# Patient Record
Sex: FEMALE | Race: WHITE | Employment: OTHER | URBAN - METROPOLITAN AREA
[De-identification: names, ages, dates, MRNs, and addresses within clinical notes are randomized per-mention and may not be internally consistent; named-entity substitution may affect disease eponyms.]

---

## 2017-07-07 ENCOUNTER — HOSPITAL ENCOUNTER (INPATIENT)
Age: 82
LOS: 1 days | Discharge: HOME | DRG: 287 | End: 2017-07-08
Attending: INTERNAL MEDICINE | Admitting: INTERNAL MEDICINE
Payer: MEDICARE

## 2017-07-07 PROBLEM — I20.0 UNSTABLE ANGINA (HCC): Status: ACTIVE | Noted: 2017-07-07

## 2017-07-07 LAB — TROPONIN I SERPL-MCNC: <0.02 NG/ML (ref 0.02–0.05)

## 2017-07-07 PROCEDURE — 84484 ASSAY OF TROPONIN QUANT: CPT | Performed by: INTERNAL MEDICINE

## 2017-07-07 PROCEDURE — 74011250636 HC RX REV CODE- 250/636: Performed by: INTERNAL MEDICINE

## 2017-07-07 PROCEDURE — 36415 COLL VENOUS BLD VENIPUNCTURE: CPT | Performed by: INTERNAL MEDICINE

## 2017-07-07 PROCEDURE — 65660000000 HC RM CCU STEPDOWN

## 2017-07-07 PROCEDURE — 74011000250 HC RX REV CODE- 250: Performed by: INTERNAL MEDICINE

## 2017-07-07 PROCEDURE — 93005 ELECTROCARDIOGRAM TRACING: CPT | Performed by: INTERNAL MEDICINE

## 2017-07-07 PROCEDURE — 74011250637 HC RX REV CODE- 250/637

## 2017-07-07 PROCEDURE — 74011250637 HC RX REV CODE- 250/637: Performed by: INTERNAL MEDICINE

## 2017-07-07 RX ORDER — CLOPIDOGREL BISULFATE 75 MG/1
75 TABLET ORAL DAILY
Status: DISCONTINUED | OUTPATIENT
Start: 2017-07-08 | End: 2017-07-08 | Stop reason: HOSPADM

## 2017-07-07 RX ORDER — GUAIFENESIN 100 MG/5ML
81 LIQUID (ML) ORAL DAILY
Status: DISCONTINUED | OUTPATIENT
Start: 2017-07-08 | End: 2017-07-08 | Stop reason: HOSPADM

## 2017-07-07 RX ORDER — AMIODARONE HYDROCHLORIDE 200 MG/1
200 TABLET ORAL 2 TIMES DAILY
Status: DISCONTINUED | OUTPATIENT
Start: 2017-07-08 | End: 2017-07-08 | Stop reason: HOSPADM

## 2017-07-07 RX ORDER — SODIUM CHLORIDE 0.9 % (FLUSH) 0.9 %
5-10 SYRINGE (ML) INJECTION AS NEEDED
Status: DISCONTINUED | OUTPATIENT
Start: 2017-07-07 | End: 2017-07-08 | Stop reason: HOSPADM

## 2017-07-07 RX ORDER — SODIUM CHLORIDE 9 MG/ML
75 INJECTION, SOLUTION INTRAVENOUS CONTINUOUS
Status: DISCONTINUED | OUTPATIENT
Start: 2017-07-07 | End: 2017-07-08 | Stop reason: HOSPADM

## 2017-07-07 RX ORDER — TIMOLOL MALEATE 5 MG/ML
1 SOLUTION/ DROPS OPHTHALMIC DAILY
Status: DISCONTINUED | OUTPATIENT
Start: 2017-07-08 | End: 2017-07-08 | Stop reason: HOSPADM

## 2017-07-07 RX ORDER — LATANOPROST 50 UG/ML
1 SOLUTION/ DROPS OPHTHALMIC
Status: DISCONTINUED | OUTPATIENT
Start: 2017-07-07 | End: 2017-07-08 | Stop reason: HOSPADM

## 2017-07-07 RX ORDER — NITROGLYCERIN 0.4 MG/1
0.4 TABLET SUBLINGUAL
Status: DISCONTINUED | OUTPATIENT
Start: 2017-07-07 | End: 2017-07-08 | Stop reason: HOSPADM

## 2017-07-07 RX ORDER — ROPINIROLE 1 MG/1
1 TABLET, FILM COATED ORAL 3 TIMES DAILY
Status: DISCONTINUED | OUTPATIENT
Start: 2017-07-07 | End: 2017-07-08 | Stop reason: HOSPADM

## 2017-07-07 RX ORDER — ONDANSETRON 2 MG/ML
4 INJECTION INTRAMUSCULAR; INTRAVENOUS
Status: DISCONTINUED | OUTPATIENT
Start: 2017-07-07 | End: 2017-07-08 | Stop reason: HOSPADM

## 2017-07-07 RX ORDER — NITROGLYCERIN 0.4 MG/1
0.4 TABLET SUBLINGUAL AS NEEDED
Status: DISCONTINUED | OUTPATIENT
Start: 2017-07-07 | End: 2017-07-07 | Stop reason: SDUPTHER

## 2017-07-07 RX ORDER — ADHESIVE BANDAGE
30 BANDAGE TOPICAL DAILY PRN
Status: DISCONTINUED | OUTPATIENT
Start: 2017-07-07 | End: 2017-07-08 | Stop reason: HOSPADM

## 2017-07-07 RX ORDER — NITROGLYCERIN 0.4 MG/1
TABLET SUBLINGUAL
Status: COMPLETED
Start: 2017-07-07 | End: 2017-07-07

## 2017-07-07 RX ORDER — MAG HYDROX/ALUMINUM HYD/SIMETH 200-200-20
30 SUSPENSION, ORAL (FINAL DOSE FORM) ORAL
Status: DISCONTINUED | OUTPATIENT
Start: 2017-07-07 | End: 2017-07-08 | Stop reason: HOSPADM

## 2017-07-07 RX ORDER — PANTOPRAZOLE SODIUM 40 MG/1
40 TABLET, DELAYED RELEASE ORAL
Status: DISCONTINUED | OUTPATIENT
Start: 2017-07-07 | End: 2017-07-08 | Stop reason: HOSPADM

## 2017-07-07 RX ORDER — ACETAMINOPHEN 325 MG/1
650 TABLET ORAL
Status: DISCONTINUED | OUTPATIENT
Start: 2017-07-07 | End: 2017-07-08 | Stop reason: HOSPADM

## 2017-07-07 RX ORDER — DULOXETIN HYDROCHLORIDE 20 MG/1
20 CAPSULE, DELAYED RELEASE ORAL DAILY
Status: DISCONTINUED | OUTPATIENT
Start: 2017-07-08 | End: 2017-07-08 | Stop reason: HOSPADM

## 2017-07-07 RX ORDER — SODIUM CHLORIDE 0.9 % (FLUSH) 0.9 %
5-10 SYRINGE (ML) INJECTION EVERY 8 HOURS
Status: DISCONTINUED | OUTPATIENT
Start: 2017-07-07 | End: 2017-07-08 | Stop reason: HOSPADM

## 2017-07-07 RX ORDER — METOPROLOL SUCCINATE 50 MG/1
50 TABLET, EXTENDED RELEASE ORAL DAILY
Status: DISCONTINUED | OUTPATIENT
Start: 2017-07-07 | End: 2017-07-08 | Stop reason: HOSPADM

## 2017-07-07 RX ORDER — ROSUVASTATIN CALCIUM 20 MG/1
40 TABLET, COATED ORAL
Status: DISCONTINUED | OUTPATIENT
Start: 2017-07-07 | End: 2017-07-08 | Stop reason: HOSPADM

## 2017-07-07 RX ORDER — HYDROCODONE BITARTRATE AND ACETAMINOPHEN 5; 325 MG/1; MG/1
1 TABLET ORAL 2 TIMES DAILY
Status: DISCONTINUED | OUTPATIENT
Start: 2017-07-07 | End: 2017-07-08 | Stop reason: HOSPADM

## 2017-07-07 RX ADMIN — LATANOPROST 1 DROP: 50 SOLUTION OPHTHALMIC at 22:22

## 2017-07-07 RX ADMIN — PANTOPRAZOLE SODIUM 40 MG: 40 TABLET, DELAYED RELEASE ORAL at 22:07

## 2017-07-07 RX ADMIN — Medication 5 ML: at 22:08

## 2017-07-07 RX ADMIN — NITROGLYCERIN 0.4 MG: 0.4 TABLET SUBLINGUAL at 21:07

## 2017-07-07 RX ADMIN — SODIUM CHLORIDE 75 ML/HR: 900 INJECTION, SOLUTION INTRAVENOUS at 22:09

## 2017-07-07 RX ADMIN — ALUMINUM HYDROXIDE, MAGNESIUM HYDROXIDE, AND SIMETHICONE 30 ML: 200; 200; 20 SUSPENSION ORAL at 21:22

## 2017-07-07 RX ADMIN — HYDROCODONE BITARTRATE AND ACETAMINOPHEN 1 TABLET: 5; 325 TABLET ORAL at 22:08

## 2017-07-07 RX ADMIN — METOPROLOL SUCCINATE 50 MG: 50 TABLET, EXTENDED RELEASE ORAL at 23:23

## 2017-07-07 RX ADMIN — ROSUVASTATIN CALCIUM 40 MG: 20 TABLET, FILM COATED ORAL at 22:07

## 2017-07-07 RX ADMIN — NITROGLYCERIN 0.4 MG: 0.4 TABLET SUBLINGUAL at 21:13

## 2017-07-07 RX ADMIN — ROPINIROLE HYDROCHLORIDE 1 MG: 1 TABLET, FILM COATED ORAL at 22:19

## 2017-07-07 RX ADMIN — NITROGLYCERIN 1 INCH: 20 OINTMENT TOPICAL at 23:23

## 2017-07-07 NOTE — IP AVS SNAPSHOT
[address] 
[phone] Patient: Alen Zavala MRN: [id number] CCL:[birth year] You are allergic to the following Allergen Reactions Versed (Midazolam) Anxiety Delirium Recent Documentation Height Weight BMI OB Status Smoking Status 1.651 m 57.2 kg 20.98 kg/m2 Postmenopausal Former Smoker Emergency Contacts Name Discharge Info Relation Home Work Mobile Olvin De Luna  Child [2] [phone] About your hospitalization You were admitted on:  July 7, 2017 You last received care in the:  [facility] 3 TELEMETRY You were discharged on:  July 8, 2017 Unit phone number:  [phone] Why you were hospitalized Your primary diagnosis was:  Unstable Angina (Hcc) Your diagnoses also included:  Hypertension, Hyperlipemia, Mitral Valve Insufficiency, Hypothyroidism, Cad (Coronary Artery Disease), Cva (Cerebrovascular Accident) (Hcc), Atrial Fibrillation (Hcc), Chronic Diastolic Heart Failure (Hcc) Providers Seen During Your Hospitalizations Provider Role Specialty Primary office phone Lucinda Lange MD Attending Provider Cardiology [phone] Your Primary Care Physician (PCP) Primary Care Physician Office Phone Office Fax Rosalinda davis [address] [facility] [phone] Follow-up Information Follow up With Details Comments Contact Info [facility] Schedule an appointment as soon as possible for a visit in 2 weeks  [place] [address] 
[phone] Marco Gage MD Schedule an appointment as soon as possible for a visit in 1 week  [address] [place] [address] [address] [facility] [place] [address] [phone] Neil Crenshaw MD  The office will call you with a follow up appointment [address] [facility] [place] [address] [phone] Current Discharge Medication List  
  
START taking these medications Dose & Instructions Dispensing Information Comments Morning Noon Evening Bedtime  
 pantoprazole 40 mg tablet Commonly known as:  PROTONIX Your last dose was: This AM  
   
 Dose:  40 mg Take 1 Tab by mouth Daily (before breakfast). Quantity:  30 Tab Refills:  6 CONTINUE these medications which have CHANGED Dose & Instructions Dispensing Information Comments Morning Noon Evening Bedtime  
 amiodarone 200 mg tablet Commonly known as:  CORDARONE What changed:   
- medication strength 
- how much to take 
- how to take this - when to take this 
- additional instructions Your last dose was: This AM  
   
 Dose:  200 mg Take 1 Tab by mouth daily. Quantity:  30 Tab Refills:  3 CONTINUE these medications which have NOT CHANGED Dose & Instructions Dispensing Information Comments Morning Noon Evening Bedtime ARTIFICIAL TEARS([id number]) ophthalmic solution Generic drug:  dextran 70-hypromellose Your last dose was: As needed Dose:  2 Drop Administer 2 drops to both eyes as needed. Refills:  0  
     
   
   
   
  
 aspirin 81 mg chewable tablet Your last dose was: This AM  
   
 Dose:  81 mg Take 81 mg by mouth daily. Refills:  0  
     
   
   
   
  
 CALTRATE 600+D PLUS MINERALS 600 mg calcium- 400 unit Tab Generic drug:  Calcium Carbonate-Vit D3-Min Your last dose was:  Per home schedule Take  by mouth. Refills:  0  
     
   
   
   
  
 celecoxib 100 mg capsule Commonly known as:  CELEBREX Your last dose was: This AM  
   
 Take  by mouth two (2) times a day. Refills:  0  
     
   
   
   
  
 clopidogrel 75 mg Tab Commonly known as:  PLAVIX Your last dose was: This AM  
   
 Dose:  75 mg Take 1 Tab by mouth daily. Quantity:  90 Tab Refills:  3 CRESTOR 40 mg tablet Generic drug:  rosuvastatin Your last dose was: At bedtime Dose:  40 mg Take 40 mg by mouth nightly. Refills:  0 DULoxetine 20 mg capsule Commonly known as:  CYMBALTA Your last dose was: This AM  
   
 Dose:  20 mg Take 20 mg by mouth daily. Refills:  0  
     
   
   
   
  
 ferrous sulfate 142 mg (45 mg iron) ER tablet Commonly known as:  SLOW FE  
Your last dose was: This AM  
   
 Take  by mouth two (2) times a day. Refills:  0  
     
   
   
   
  
 gemfibrozil 600 mg tablet Commonly known as:  LOPID Dose:  600 mg Take 600 mg by mouth two (2) times a day. Refills:  0 HYDROcodone-acetaminophen 5-325 mg per tablet Commonly known as:  Ruma Lyndsey Your last dose was: As needed Dose:  1 Tab Take 1 Tab by mouth two (2) times a day. Refills:  0  
     
   
   
   
  
 REQUIP 1 mg tablet Generic drug:  rOPINIRole Your last dose was: This AM  
   
 Dose:  1 mg Take 1 mg by mouth three (3) times daily. 12pm - 3pm -9pm  
 Refills:  0  
     
   
   
   
  
 SYNTHROID 125 mcg tablet Generic drug:  levothyroxine Your last dose was: This AM  
   
 Dose:  137 mcg Take 137 mcg by mouth Daily (before breakfast). Refills:  0  
     
   
   
   
  
 timolol maleate 0.5 % Drpd ophthalmic solution Your last dose was: This AM  
   
 Dose:  1 Drop Administer 1 drop to both eyes daily. 9 am  
 Refills:  0  
     
   
   
   
  
 TOPROL XL 50 mg XL tablet Generic drug:  metoprolol succinate Your last dose was: This AM  
   
 Dose:  25 mg Take 25 mg by mouth daily. Refills:  0  
     
   
   
   
  
 VITAMIN C 500 mg tablet Generic drug:  ascorbic acid (vitamin C) Your last dose was:  Per home schedule Dose:  500 mg Take 500 mg by mouth daily. Refills:  0  
     
   
   
   
  
 XALATAN 0.005 % ophthalmic solution Generic drug:  latanoprost  
 Your last dose was: At bedtime Dose:  1 Drop Administer 1 drop to left eye nightly. Refills:  0 Where to Get Your Medications Information on where to get these meds will be given to you by the nurse or doctor. ! Ask your nurse or doctor about these medications  
  amiodarone 200 mg tablet  
 pantoprazole 40 mg tablet Discharge Instructions Cardiac Catheterization/Angiography Discharge Instructions *Check the puncture site frequently for swelling or bleeding. If you see any bleeding, lie down and apply pressure over the area with a clean town or washcloth. Notify your doctor for any redness, swelling, drainage or oozing from the puncture site. Notify your doctor for any fever or chills. *If the leg or arm with the puncture becomes cold, numb or painful, call [address] Cardiology at [phone]. *Activity should be limited for the next 48 hours. Climb stairs as little as possible and avoid any stooping, bending or strenuous activity for 48 hours. No heavy lifting (anything over 10 pounds) for three days. *Do not drive for 48 hours. *You may resume your usual diet. Drink more fluids than usual. 
 
*Have a responsible person drive you home and stay with you for at least 24 hours after your heart catheterization/angiography. *You may remove the bandage from your right groin in 24 hours. You may shower in 24 hours. No tub baths, hot tubs or swimming for one week. Do not place any lotions, creams, powders, ointments over the puncture site for one week. Heart-Healthy Diet: Care Instructions Your Care Instructions A heart-healthy diet has lots of vegetables, fruits, nuts, beans, and whole grains, and is low in salt. It limits foods that are high in saturated fat, such as meats, cheeses, and fried foods.  It may be hard to change your diet, but even small changes can lower your risk of heart attack and heart disease. Follow-up care is a key part of your treatment and safety. Be sure to make and go to all appointments, and call your doctor if you are having problems. It's also a good idea to know your test results and keep a list of the medicines you take. How can you care for yourself at home? Watch your portions · Learn what a serving is. A \"serving\" and a \"portion\" are not always the same thing. Make sure that you are not eating larger portions than are recommended. For example, a serving of pasta is ½ cup. A serving size of meat is 2 to 3 ounces. A 3-ounce serving is about the size of a deck of cards. Measure serving sizes until you are good at [place]" them. Keep in mind that restaurants often serve portions that are 2 or 3 times the size of one serving. · To keep your energy level up and keep you from feeling hungry, eat often but in smaller portions. · Eat only the number of calories you need to stay at a healthy weight. If you need to lose weight, eat fewer calories than your body burns (through exercise and other physical activity). Eat more fruits and vegetables · Eat a variety of fruit and vegetables every day. Dark green, deep orange, red, or yellow fruits and vegetables are especially good for you. Examples include spinach, carrots, peaches, and berries. · Keep carrots, celery, and other veggies handy for snacks. Buy fruit that is in season and store it where you can see it so that you will be tempted to eat it. · Cook dishes that have a lot of veggies in them, such as stir-fries and soups. Limit saturated and trans fat · Read food labels, and try to avoid saturated and trans fats. They increase your risk of heart disease. Trans fat is found in many processed foods such as cookies and crackers. · Use olive or canola oil when you cook. Try cholesterol-lowering spreads, such as Benecol or Take Control. · Bake, broil, grill, or steam foods instead of frying them. · Choose lean meats instead of high-fat meats such as hot dogs and sausages. Cut off all visible fat when you prepare meat. · Eat fish, skinless poultry, and meat alternatives such as soy products instead of high-fat meats. Soy products, such as tofu, may be especially good for your heart. · Choose low-fat or fat-free milk and dairy products. Eat fish · Eat at least two servings of fish a week. Certain fish, such as salmon and tuna, contain omega-3 fatty acids, which may help reduce your risk of heart attack. Eat foods high in fiber · Eat a variety of grain products every day. Include whole-grain foods that have lots of fiber and nutrients. Examples of whole-grain foods include oats, whole wheat bread, and brown rice. · Buy whole-grain breads and cereals, instead of white bread or pastries. Limit salt and sodium · Limit how much salt and sodium you eat to help lower your blood pressure. · Taste food before you salt it. Add only a little salt when you think you need it. With time, your taste buds will adjust to less salt. · Eat fewer snack items, fast foods, and other high-salt, processed foods. Check food labels for the amount of sodium in packaged foods. · Choose low-sodium versions of canned goods (such as soups, vegetables, and beans). Limit sugar · Limit drinks and foods with added sugar. These include candy, desserts, and soda pop. Limit alcohol · Limit alcohol to no more than 2 drinks a day for men and 1 drink a day for women. Too much alcohol can cause health problems. When should you call for help? Watch closely for changes in your health, and be sure to contact your doctor if: 
· You would like help planning heart-healthy meals. Where can you learn more? Go to http://bal-rosa isela.info/. Enter V137 in the search box to learn more about \"Heart-Healthy Diet: Care Instructions. \" Current as of: April 3, 2017 Content Version: 11.3 © 3333-7876 [employer]. Care instructions adapted under license by [employer] (which disclaims liability or warranty for this information). If you have questions about a medical condition or this instruction, always ask your healthcare professional. [address] any warranty or liability for your use of this information. Gastroesophageal Reflux Disease (GERD): Care Instructions Your Care Instructions Gastroesophageal reflux disease (GERD) is the backward flow of stomach acid into the esophagus. The esophagus is the tube that leads from your throat to your stomach. A one-way valve prevents the stomach acid from moving up into this tube. When you have GERD, this valve does not close tightly enough. If you have mild GERD symptoms including heartburn, you may be able to control the problem with antacids or over-the-counter medicine. Changing your diet, losing weight, and making other lifestyle changes can also help reduce symptoms. Follow-up care is a key part of your treatment and safety. Be sure to make and go to all appointments, and call your doctor if you are having problems. Its also a good idea to know your test results and keep a list of the medicines you take. How can you care for yourself at home? · Take your medicines exactly as prescribed. Call your doctor if you think you are having a problem with your medicine. · Your doctor may recommend over-the-counter medicine. For mild or occasional indigestion, antacids, such as Tums, Gaviscon, Mylanta, or Maalox, may help. Your doctor also may recommend over-the-counter acid reducers, such as Pepcid AC, Tagamet HB, Zantac 75, or Prilosec. Read and follow all instructions on the label. If you use these medicines often, talk with your doctor. · Change your eating habits. ¨ Its best to eat several small meals instead of two or three large meals. ¨ After you eat, wait 2 to 3 hours before you lie down. ¨ Chocolate, mint, and alcohol can make GERD worse. ¨ Spicy foods, foods that have a lot of acid (like tomatoes and oranges), and coffee can make GERD symptoms worse in some people. If your symptoms are worse after you eat a certain food, you may want to stop eating that food to see if your symptoms get better. · Do not smoke or chew tobacco. Smoking can make GERD worse. If you need help quitting, talk to your doctor about stop-smoking programs and medicines. These can increase your chances of quitting for good. · If you have GERD symptoms at night, raise the head of your bed 6 to 8 inches by putting the frame on blocks or placing a foam wedge under the head of your mattress. (Adding extra pillows does not work.) · Do not wear tight clothing around your middle. · Lose weight if you need to. Losing just 5 to 10 pounds can help. When should you call for help? Call your doctor now or seek immediate medical care if: 
· You have new or different belly pain. · Your stools are black and tarlike or have streaks of blood. Watch closely for changes in your health, and be sure to contact your doctor if: 
· Your symptoms have not improved after 2 days. · Food seems to catch in your throat or chest. 
Where can you learn more? Go to http://bal-rosa isela.info/. Enter I610 in the search box to learn more about \"Gastroesophageal Reflux Disease (GERD): Care Instructions. \" Current as of: August 9, 2016 Content Version: 11.3 © 7697-9272 [employer]. Care instructions adapted under license by [employer] (which disclaims liability or warranty for this information). If you have questions about a medical condition or this instruction, always ask your healthcare professional. [place] [address] any warranty or liability for your use of this information. Discharge Orders None [facility] Announcement We are excited to announce that we are making your provider's discharge notes available to you in [employer]. You will see these notes when they are completed and signed by the physician that discharged you from your recent hospital stay. If you have any questions or concerns about any information you see in [employer], please call the Health Information Department where you were seen or reach out to your Primary Care Provider for more information about your plan of care. Introducing [facility]! Hesham Gomez introduces [employer] patient portal. Now you can access parts of your medical record, email your doctor's office, and request medication refills online. 1. In your internet browser, go to [email or web address] 2. Click on the First Time User? Click Here link in the Sign In box. You will see the New Member Sign Up page. 3. Enter your [employer] Access Code exactly as it appears below. You will not need to use this code after youve completed the sign-up process. If you do not sign up before the expiration date, you must request a new code. · [employer] Access Code: [email or web address] Expires: 10/5/2017  7:37 PM 
 
4. Enter the last four digits of your Social Security Number (xxxx) and Date of Birth (mm/dd/yyyy) as indicated and click Submit. You will be taken to the next sign-up page. 5. Create a [employer] ID. This will be your [employer] login ID and cannot be changed, so think of one that is secure and easy to remember. 6. Create a [employer] password. You can change your password at any time. 7. Enter your Password Reset Question and Answer. This can be used at a later time if you forget your password. 8. Enter your e-mail address. You will receive e-mail notification when new information is available in [address] 9. Click Sign Up. You can now view and download portions of your medical record.  
10. Click the Download Summary menu link to download a portable copy of your medical information. If you have questions, please visit the Frequently Asked Questions section of the MyChart website. Remember, MyChart is NOT to be used for urgent needs. For medical emergencies, dial 911. Now available from your iPhone and Android! General Information Please provide this summary of care documentation to your next provider. Patient Signature:  ____________________________________________________________ Date:  ____________________________________________________________  
  
[facility] Signature:  ____________________________________________________________ Date:  ____________________________________________________________

## 2017-07-07 NOTE — IP AVS SNAPSHOT
Current Discharge Medication List  
  
START taking these medications Dose & Instructions Dispensing Information Comments Morning Noon Evening Bedtime  
 pantoprazole 40 mg tablet Commonly known as:  PROTONIX Your last dose was: This AM  
   
 Dose:  40 mg Take 1 Tab by mouth Daily (before breakfast). Quantity:  30 Tab Refills:  6 CONTINUE these medications which have CHANGED Dose & Instructions Dispensing Information Comments Morning Noon Evening Bedtime  
 amiodarone 200 mg tablet Commonly known as:  CORDARONE What changed:   
- medication strength 
- how much to take 
- how to take this - when to take this 
- additional instructions Your last dose was: This AM  
   
 Dose:  200 mg Take 1 Tab by mouth daily. Quantity:  30 Tab Refills:  3 CONTINUE these medications which have NOT CHANGED Dose & Instructions Dispensing Information Comments Morning Noon Evening Bedtime ARTIFICIAL TEARS([id number]) ophthalmic solution Generic drug:  dextran 70-hypromellose Your last dose was: As needed Dose:  2 Drop Administer 2 drops to both eyes as needed. Refills:  0  
     
   
   
   
  
 aspirin 81 mg chewable tablet Your last dose was: This AM  
   
 Dose:  81 mg Take 81 mg by mouth daily. Refills:  0  
     
   
   
   
  
 CALTRATE 600+D PLUS MINERALS 600 mg calcium- 400 unit Tab Generic drug:  Calcium Carbonate-Vit D3-Min Your last dose was:  Per home schedule Take  by mouth. Refills:  0  
     
   
   
   
  
 celecoxib 100 mg capsule Commonly known as:  CELEBREX Your last dose was: This AM  
   
 Take  by mouth two (2) times a day. Refills:  0  
     
   
   
   
  
 clopidogrel 75 mg Tab Commonly known as:  PLAVIX Your last dose was: This AM  
   
 Dose:  75 mg Take 1 Tab by mouth daily. Quantity:  90 Tab Refills:  3 CRESTOR 40 mg tablet Generic drug:  rosuvastatin Your last dose was: At bedtime Dose:  40 mg Take 40 mg by mouth nightly. Refills:  0 DULoxetine 20 mg capsule Commonly known as:  CYMBALTA Your last dose was: This AM  
   
 Dose:  20 mg Take 20 mg by mouth daily. Refills:  0  
     
   
   
   
  
 ferrous sulfate 142 mg (45 mg iron) ER tablet Commonly known as:  SLOW FE  
Your last dose was: This AM  
   
 Take  by mouth two (2) times a day. Refills:  0  
     
   
   
   
  
 gemfibrozil 600 mg tablet Commonly known as:  LOPID Dose:  600 mg Take 600 mg by mouth two (2) times a day. Refills:  0 HYDROcodone-acetaminophen 5-325 mg per tablet Commonly known as:  [place] No Your last dose was: As needed Dose:  1 Tab Take 1 Tab by mouth two (2) times a day. Refills:  0  
     
   
   
   
  
 REQUIP 1 mg tablet Generic drug:  rOPINIRole Your last dose was: This AM  
   
 Dose:  1 mg Take 1 mg by mouth three (3) times daily. 12pm - 3pm -9pm  
 Refills:  0  
     
   
   
   
  
 SYNTHROID 125 mcg tablet Generic drug:  levothyroxine Your last dose was: This AM  
   
 Dose:  137 mcg Take 137 mcg by mouth Daily (before breakfast). Refills:  0  
     
   
   
   
  
 timolol maleate 0.5 % Drpd ophthalmic solution Your last dose was: This AM  
   
 Dose:  1 Drop Administer 1 drop to both eyes daily. 9 am  
 Refills:  0  
     
   
   
   
  
 TOPROL XL 50 mg XL tablet Generic drug:  metoprolol succinate Your last dose was: This AM  
   
 Dose:  25 mg Take 25 mg by mouth daily. Refills:  0  
     
   
   
   
  
 VITAMIN C 500 mg tablet Generic drug:  ascorbic acid (vitamin C) Your last dose was:  Per home schedule Dose:  500 mg Take 500 mg by mouth daily. Refills:  0  
     
   
   
   
  
 XALATAN 0.005 % ophthalmic solution Generic drug:  latanoprost  
 Your last dose was: At bedtime Dose:  1 Drop Administer 1 drop to left eye nightly. Refills:  0 Where to Get Your Medications Information on where to get these meds will be given to you by the nurse or doctor. ! Ask your nurse or doctor about these medications  
  amiodarone 200 mg tablet  
 pantoprazole 40 mg tablet

## 2017-07-08 VITALS
SYSTOLIC BLOOD PRESSURE: 109 MMHG | BODY MASS INDEX: 21.01 KG/M2 | TEMPERATURE: 97.9 F | HEART RATE: 67 BPM | OXYGEN SATURATION: 97 % | DIASTOLIC BLOOD PRESSURE: 49 MMHG | WEIGHT: 126.1 LBS | HEIGHT: 65 IN | RESPIRATION RATE: 18 BRPM

## 2017-07-08 LAB
ALBUMIN SERPL BCP-MCNC: 3 G/DL (ref 3.2–4.6)
ALBUMIN/GLOB SERPL: 0.9 {RATIO} (ref 1.2–3.5)
ALP SERPL-CCNC: 72 U/L (ref 50–136)
ALT SERPL-CCNC: 16 U/L (ref 12–65)
ANION GAP BLD CALC-SCNC: 9 MMOL/L (ref 7–16)
AST SERPL W P-5'-P-CCNC: 20 U/L (ref 15–37)
ATRIAL RATE: 120 BPM
ATRIAL RATE: 68 BPM
BACTERIA SPEC CULT: NORMAL
BILIRUB DIRECT SERPL-MCNC: <0.1 MG/DL
BILIRUB SERPL-MCNC: 0.2 MG/DL (ref 0.2–1.1)
BUN SERPL-MCNC: 29 MG/DL (ref 8–23)
CALCIUM SERPL-MCNC: 8.9 MG/DL (ref 8.3–10.4)
CALCULATED P AXIS, ECG09: 93 DEGREES
CALCULATED R AXIS, ECG10: -10 DEGREES
CALCULATED R AXIS, ECG10: 56 DEGREES
CALCULATED T AXIS, ECG11: 123 DEGREES
CALCULATED T AXIS, ECG11: 65 DEGREES
CHLORIDE SERPL-SCNC: 111 MMOL/L (ref 98–107)
CHOLEST SERPL-MCNC: 147 MG/DL
CO2 SERPL-SCNC: 23 MMOL/L (ref 21–32)
CREAT SERPL-MCNC: 0.88 MG/DL (ref 0.6–1)
DIAGNOSIS, 93000: NORMAL
DIAGNOSIS, 93000: NORMAL
ERYTHROCYTE [DISTWIDTH] IN BLOOD BY AUTOMATED COUNT: 13.9 % (ref 11.9–14.6)
GLOBULIN SER CALC-MCNC: 3.2 G/DL (ref 2.3–3.5)
GLUCOSE SERPL-MCNC: 91 MG/DL (ref 65–100)
HCT VFR BLD AUTO: 28.9 % (ref 35.8–46.3)
HDLC SERPL-MCNC: 83 MG/DL (ref 40–60)
HDLC SERPL: 1.8 {RATIO}
HGB BLD-MCNC: 9.5 G/DL (ref 11.7–15.4)
LDLC SERPL CALC-MCNC: 56 MG/DL
LIPID PROFILE,FLP: ABNORMAL
MCH RBC QN AUTO: 32.4 PG (ref 26.1–32.9)
MCHC RBC AUTO-ENTMCNC: 32.9 G/DL (ref 31.4–35)
MCV RBC AUTO: 98.6 FL (ref 79.6–97.8)
P-R INTERVAL, ECG05: 304 MS
PLATELET # BLD AUTO: 295 K/UL (ref 150–450)
PMV BLD AUTO: 9.9 FL (ref 10.8–14.1)
POTASSIUM SERPL-SCNC: 4.6 MMOL/L (ref 3.5–5.1)
PROT SERPL-MCNC: 6.2 G/DL (ref 6.3–8.2)
Q-T INTERVAL, ECG07: 352 MS
Q-T INTERVAL, ECG07: 422 MS
QRS DURATION, ECG06: 110 MS
QRS DURATION, ECG06: 84 MS
QTC CALCULATION (BEZET), ECG08: 448 MS
QTC CALCULATION (BEZET), ECG08: 456 MS
RBC # BLD AUTO: 2.93 M/UL (ref 4.05–5.25)
SERVICE CMNT-IMP: NORMAL
SODIUM SERPL-SCNC: 143 MMOL/L (ref 136–145)
TRIGL SERPL-MCNC: 40 MG/DL (ref 35–150)
TROPONIN I SERPL-MCNC: <0.02 NG/ML (ref 0.02–0.05)
TSH SERPL DL<=0.005 MIU/L-ACNC: 2.95 UIU/ML (ref 0.36–3.74)
VENTRICULAR RATE, ECG03: 101 BPM
VENTRICULAR RATE, ECG03: 68 BPM
VLDLC SERPL CALC-MCNC: 8 MG/DL (ref 6–23)
WBC # BLD AUTO: 6.3 K/UL (ref 4.3–11.1)

## 2017-07-08 PROCEDURE — 77030004534 HC CATH ANGI DX INFN CARD -A

## 2017-07-08 PROCEDURE — 84484 ASSAY OF TROPONIN QUANT: CPT | Performed by: INTERNAL MEDICINE

## 2017-07-08 PROCEDURE — C1894 INTRO/SHEATH, NON-LASER: HCPCS

## 2017-07-08 PROCEDURE — 93306 TTE W/DOPPLER COMPLETE: CPT

## 2017-07-08 PROCEDURE — B2181ZZ FLUOROSCOPY OF LEFT INTERNAL MAMMARY BYPASS GRAFT USING LOW OSMOLAR CONTRAST: ICD-10-PCS | Performed by: INTERNAL MEDICINE

## 2017-07-08 PROCEDURE — 80048 BASIC METABOLIC PNL TOTAL CA: CPT | Performed by: INTERNAL MEDICINE

## 2017-07-08 PROCEDURE — B2131ZZ FLUOROSCOPY OF MULTIPLE CORONARY ARTERY BYPASS GRAFTS USING LOW OSMOLAR CONTRAST: ICD-10-PCS | Performed by: INTERNAL MEDICINE

## 2017-07-08 PROCEDURE — 87641 MR-STAPH DNA AMP PROBE: CPT | Performed by: INTERNAL MEDICINE

## 2017-07-08 PROCEDURE — 99153 MOD SED SAME PHYS/QHP EA: CPT

## 2017-07-08 PROCEDURE — 80076 HEPATIC FUNCTION PANEL: CPT | Performed by: INTERNAL MEDICINE

## 2017-07-08 PROCEDURE — 84443 ASSAY THYROID STIM HORMONE: CPT | Performed by: INTERNAL MEDICINE

## 2017-07-08 PROCEDURE — B2151ZZ FLUOROSCOPY OF LEFT HEART USING LOW OSMOLAR CONTRAST: ICD-10-PCS | Performed by: INTERNAL MEDICINE

## 2017-07-08 PROCEDURE — 93459 L HRT ART/GRFT ANGIO: CPT

## 2017-07-08 PROCEDURE — 99152 MOD SED SAME PHYS/QHP 5/>YRS: CPT

## 2017-07-08 PROCEDURE — 36415 COLL VENOUS BLD VENIPUNCTURE: CPT | Performed by: INTERNAL MEDICINE

## 2017-07-08 PROCEDURE — 74011636320 HC RX REV CODE- 636/320: Performed by: INTERNAL MEDICINE

## 2017-07-08 PROCEDURE — 77030004558 HC CATH ANGI DX SUPR TORQ CARD -A

## 2017-07-08 PROCEDURE — B2111ZZ FLUOROSCOPY OF MULTIPLE CORONARY ARTERIES USING LOW OSMOLAR CONTRAST: ICD-10-PCS | Performed by: INTERNAL MEDICINE

## 2017-07-08 PROCEDURE — 85027 COMPLETE CBC AUTOMATED: CPT | Performed by: INTERNAL MEDICINE

## 2017-07-08 PROCEDURE — 74011250636 HC RX REV CODE- 250/636: Performed by: INTERNAL MEDICINE

## 2017-07-08 PROCEDURE — 77030004559 HC CATH ANGI DX SUPT CARD -B

## 2017-07-08 PROCEDURE — 80061 LIPID PANEL: CPT | Performed by: INTERNAL MEDICINE

## 2017-07-08 PROCEDURE — 76937 US GUIDE VASCULAR ACCESS: CPT

## 2017-07-08 PROCEDURE — C1769 GUIDE WIRE: HCPCS

## 2017-07-08 PROCEDURE — C1760 CLOSURE DEV, VASC: HCPCS

## 2017-07-08 PROCEDURE — 74011250637 HC RX REV CODE- 250/637: Performed by: INTERNAL MEDICINE

## 2017-07-08 PROCEDURE — 74011250636 HC RX REV CODE- 250/636

## 2017-07-08 PROCEDURE — 93005 ELECTROCARDIOGRAM TRACING: CPT | Performed by: INTERNAL MEDICINE

## 2017-07-08 PROCEDURE — 77030013687 HC GD NDL BARD -B

## 2017-07-08 PROCEDURE — 4A023N7 MEASUREMENT OF CARDIAC SAMPLING AND PRESSURE, LEFT HEART, PERCUTANEOUS APPROACH: ICD-10-PCS | Performed by: INTERNAL MEDICINE

## 2017-07-08 RX ORDER — AMIODARONE HYDROCHLORIDE 200 MG/1
200 TABLET ORAL DAILY
Qty: 30 TAB | Refills: 3 | Status: SHIPPED | OUTPATIENT
Start: 2017-07-08 | End: 2017-11-08 | Stop reason: SDUPTHER

## 2017-07-08 RX ORDER — SODIUM CHLORIDE 9 MG/ML
250 INJECTION, SOLUTION INTRAVENOUS CONTINUOUS
Status: DISPENSED | OUTPATIENT
Start: 2017-07-08 | End: 2017-07-08

## 2017-07-08 RX ORDER — SODIUM CHLORIDE 0.9 % (FLUSH) 0.9 %
5-10 SYRINGE (ML) INJECTION AS NEEDED
Status: DISCONTINUED | OUTPATIENT
Start: 2017-07-08 | End: 2017-07-08 | Stop reason: HOSPADM

## 2017-07-08 RX ORDER — HEPARIN SODIUM 200 [USP'U]/100ML
3 INJECTION, SOLUTION INTRAVENOUS CONTINUOUS
Status: DISCONTINUED | OUTPATIENT
Start: 2017-07-08 | End: 2017-07-08 | Stop reason: HOSPADM

## 2017-07-08 RX ORDER — FENTANYL CITRATE 50 UG/ML
25-100 INJECTION, SOLUTION INTRAMUSCULAR; INTRAVENOUS
Status: DISCONTINUED | OUTPATIENT
Start: 2017-07-08 | End: 2017-07-08 | Stop reason: HOSPADM

## 2017-07-08 RX ORDER — SODIUM CHLORIDE 0.9 % (FLUSH) 0.9 %
5-10 SYRINGE (ML) INJECTION EVERY 8 HOURS
Status: DISCONTINUED | OUTPATIENT
Start: 2017-07-08 | End: 2017-07-08 | Stop reason: HOSPADM

## 2017-07-08 RX ORDER — PANTOPRAZOLE SODIUM 40 MG/1
40 TABLET, DELAYED RELEASE ORAL
Qty: 30 TAB | Refills: 6 | Status: SHIPPED | OUTPATIENT
Start: 2017-07-08 | End: 2019-09-03

## 2017-07-08 RX ADMIN — IOPAMIDOL 115 ML: 755 INJECTION, SOLUTION INTRAVENOUS at 13:06

## 2017-07-08 RX ADMIN — ROPINIROLE HYDROCHLORIDE 1 MG: 1 TABLET, FILM COATED ORAL at 08:07

## 2017-07-08 RX ADMIN — ROPINIROLE HYDROCHLORIDE 1 MG: 1 TABLET, FILM COATED ORAL at 14:18

## 2017-07-08 RX ADMIN — LEVOTHYROXINE SODIUM 137 MCG: 112 TABLET ORAL at 08:07

## 2017-07-08 RX ADMIN — Medication 10 ML: at 05:12

## 2017-07-08 RX ADMIN — ASPIRIN 81 MG 81 MG: 81 TABLET ORAL at 08:07

## 2017-07-08 RX ADMIN — AMIODARONE HYDROCHLORIDE 200 MG: 200 TABLET ORAL at 08:07

## 2017-07-08 RX ADMIN — PANTOPRAZOLE SODIUM 40 MG: 40 TABLET, DELAYED RELEASE ORAL at 08:07

## 2017-07-08 RX ADMIN — CLOPIDOGREL BISULFATE 75 MG: 75 TABLET ORAL at 08:07

## 2017-07-08 RX ADMIN — DULOXETINE HYDROCHLORIDE 20 MG: 20 CAPSULE, DELAYED RELEASE ORAL at 08:08

## 2017-07-08 RX ADMIN — NITROGLYCERIN 1 INCH: 20 OINTMENT TOPICAL at 05:12

## 2017-07-08 RX ADMIN — FENTANYL CITRATE 50 MCG: 50 INJECTION, SOLUTION INTRAMUSCULAR; INTRAVENOUS at 13:05

## 2017-07-08 RX ADMIN — HYDROCODONE BITARTRATE AND ACETAMINOPHEN 1 TABLET: 5; 325 TABLET ORAL at 08:08

## 2017-07-08 RX ADMIN — HEPARIN SODIUM 3 ML/HR: 200 INJECTION, SOLUTION INTRAVENOUS at 13:05

## 2017-07-08 NOTE — H&P
[facility] CARDIOLOGY History &Physical                Primary Cardiologist: Dr Zuri Hayes    Primary Care Physician:  Joaquin Douglas MD    Subjective:     Patient is a 80 y.o. female presents with 2 weeks of chest pressure and pain. This has been on/off most days primarily around eating. She does describe exertional dyspnea and chest pressure while walking to dining [place]. ECG with NSIVCD and probable atrial flutter with rate 100-110bpm.  No acute ST changes and initial troponin is (-). Patient developed recurrent CP after dinner tonight and some better with NTG and did improve with Maalox.   .     Past Medical History:   Diagnosis Date    Anemia associated with acute blood loss 7/24/2011    Atrial fibrillation ([facility] [address] 7/13/2011    CAD (coronary artery disease) 1997    MI, PCI , CABG 7/13/2011    Chronic diastolic heart failure ([facility] [address] 10/29/2015    Chronic pain     Constipation- no BM since 7/30 8/5/2011    Coronary atherosclerosis of native coronary artery--PTCA 1997 LAD/diag 7/1/2011    CVA (cerebrovascular accident) ([facility] [address] 3/29/2009    mild memory issues, rls, afib    GI bleed 1/11/2015    HEMOTHORAX ON LEFT  7/24/2011    Hyperlipemia 3/29/2009    Hypertension 3/29/2009    Hypertension, benign 10/29/2015    Hyponatremia 7/24/2011    Hypothyroidism 7/7/2011    Leucocytosis 8/3/2011    Leukocytosis 1/11/2015    Irene-Starr tear 1/12/2015    Mitral valve insufficiency 4/2/2009    Paroxysmal atrial fibrillation (HCC) 10/29/2015    Pleural effusion, bilateral 7/23/2011    Right thorocetesis 1200ml removed 7/23/11 and 1000ml removed 7/28/11 Left thorocentesis 1100ml removed 7/23/11     S/p thoracotomy- evauation of hemothorax 8/4/2011    Unspecified hypothyroidism 3/30/2009        Current Facility-Administered Medications:     nitroglycerin (NITROSTAT) tablet 0.4 mg, 0.4 mg, SubLINGual, PRN, Ann Marie Jain MD, 0.4 mg at 07/07/17 2113    alum-mag hydroxide-simeth (MYLANTA) oral suspension 30 mL, 30 mL, Oral, Q4H PRN, Anjum Awad MD, 30 mL at 07/07/17 2122  Allergies   Allergen Reactions    Versed [Midazolam] Anxiety     Delirium      Social History   Substance Use Topics    Smoking status: Former Smoker     Quit date: 1951    Smokeless tobacco: Not on file    Alcohol use No      Family History   Problem Relation Age of Onset    Heart Attack Father 36        Review of Systems    Review of Systems   Constitution: Positive for weakness and malaise/fatigue. Negative for chills and fever. HENT: Negative for headaches and hearing loss. Eyes: Negative for visual disturbance. Cardiovascular: Positive for chest pain and dyspnea on exertion. Negative for palpitations. Respiratory: Negative for cough, shortness of breath and wheezing. Skin: Negative for rash. Musculoskeletal: Negative for back pain, muscle cramps and muscle weakness. Gastrointestinal: Positive for heartburn. Negative for constipation, diarrhea and nausea. Genitourinary: Negative for dysuria. Neurological: Negative for dizziness. Psychiatric/Behavioral: Negative for depression. Objective:       Visit Vitals    /69    Pulse 93    Temp 97.6 °F (36.4 °C)    Resp 17    Ht 5' 5\" (1.651 m)    SpO2 96%       07/07 1901 - 07/08 0700  In: -   Out: 100 [Urine:100]       Physical Exam   Constitutional: She is oriented to person, place, and time. She appears well-developed and well-nourished. HENT:   Head: Normocephalic and atraumatic. Eyes: Conjunctivae are normal. Pupils are equal, round, and reactive to light. Neck: Neck supple. No thyromegaly present. Cardiovascular: Normal rate and regular rhythm. Murmur heard. Systolic murmur is present with a grade of 2/6  at the upper right sternal border  Pulmonary/Chest: Breath sounds normal.   Abdominal: Soft. Bowel sounds are normal.   Musculoskeletal: Normal range of motion.    Neurological: She is alert and oriented to person, place, and time. Skin: Skin is dry. ECG: atrial tachycardia - atypical flutter at 110bpm     Data Review:     No results found for this or any previous visit (from the past 24 hour(s)). Assessment/Plan:   Principal Problem:    Unstable angina ([address] (7/7/2017) - This is an at risk patient with established CAD and CABG 2011. She had LIMA-LAD, SVG-OM, SVG-PDA. This was complicated by atrial fibrillation and hemothorax. She now attests to exertional symptoms and pain after eating. Plan NTG paste and serial troponins. Patient is very functional and may need to consider C tomorrow. Active Problems:    CVA (cerebrovascular accident) ([facility] [address] (3/29/2009) - On ASA and clopidogrel. Has short-term memory loss but functions well      Hypertension (3/29/2009) - This is controlled      Hyperlipemia (3/29/2009) - On Crestor and hold Lopid is the face of advanced age and risk for rhabdo      Mitral valve insufficiency (4/2/2009) - Check echo      Hypothyroidism (7/7/2011) - Check TSH      Atrial fibrillation ([address] (7/13/2011) - Give metoprolol and repeat ECG.   Suspect this maybe atrial flutter      CAD (coronary artery disease) (8/4/2011)      Overview: CABG 2011:  LIMA-LAD, SVG-OM, SVG-PDA      Chronic diastolic heart failure ([address] (10/29/2015)                  Shayy Zamorano MD

## 2017-07-08 NOTE — PROGRESS NOTES
Problem: Patient Education: Go to Patient Education Activity  Goal: Patient/Family Education  Outcome: Progressing Towards Goal  Consent signed and patient prepared for heart cath.

## 2017-07-08 NOTE — DISCHARGE SUMMARY
[address] Cardiology Discharge Summary     Patient ID:  Argelia Valdez  [id number]  22 y.o.  5/1/1931    Admit date: 7/7/2017    Discharge date:  7/8/2017    Admitting Physician: Bonnie Kam MD     Discharge Physician: ARABELLA Wilde/Dr. Tracey Gordon     Admission Diagnoses: [place]  Unstable angina [facility])    Discharge Diagnoses:   Patient Active Problem List    Diagnosis Date Noted    Unstable angina ([facility] [address] 07/07/2017    S/P CABG (coronary artery bypass graft) 09/14/2016    Paroxysmal atrial fibrillation ([facility] [address] 10/29/2015    Chronic diastolic heart failure ([facility] [address] 10/29/2015    Irene-Starr tear 01/12/2015    GI bleed 01/11/2015    Leukocytosis 01/11/2015    Constipation- no BM since 7/30 08/05/2011    CAD (coronary artery disease) 08/04/2011    S/p thoracotomy- evauation of hemothorax 08/04/2011    Leucocytosis 08/03/2011    Anemia associated with acute blood loss 07/24/2011    Hyponatremia 07/24/2011    HEMOTHORAX ON LEFT  07/24/2011    Pleural effusion, bilateral 07/23/2011    Atrial fibrillation ([facility] [address] 07/13/2011    Hypothyroidism 07/07/2011    Coronary atherosclerosis of native coronary artery--PTCA 1997 LAD/diag 07/01/2011    Mitral valve insufficiency 04/02/2009    Unspecified hypothyroidism 03/30/2009    CVA (cerebrovascular accident) ([facility] [address] 03/29/2009    Hypertension 03/29/2009    Hyperlipemia 03/29/2009       Cardiology Procedures this admission:  Diagnostic left heart catheterization  Consults: None    Hospital Course: Patient presented to the emergency department of [facility] [place], [place] with complaints of 2 week history of chest pressure and pain. This has been on/off most days and primarily noted around eating. She did describe exertional dyspnea and chest pressure while walking to the dining [place]. ECG showed probable atrial flutter with rate 100-110bpm.  No acute ST changes on EKG. Initial troponin was negative.  Patient stated she developed recurrent chest pain after dinner. She felt some better with NTG and symptoms did improve with Maalox. She was given IV lopressor in the ER with improved heart rate. She was admitted. She remained in sinus rhythm on telemetry. LHC was planned. She underwent underwent cardiac catheterization by Dr. Eliceo Yin. Patient was found to have stable CAD with patent LIMA to LAD, patent SVG to OM2 and patent SVG to PDA. The RCA had diffuse disease with 40-50% stenosis. Patient tolerated the procedure well and returned to the telemetry floor for recovery. She was feeling well following the procedure and was determined stable and ready for discharge. She is discharged on trial of PPI and will be [facility]. For maximized medical therapy of CAD, patient will continue use of BB and statin as well. The patient will follow up with [facility] Dr. Nickie Sinha in 3-4 weeks. DISPOSITION: The patient is being discharged home in stable condition on a low saturated fat, low cholesterol and low salt diet. The patient is instructed to advance activities as tolerated to the limit of fatigue or shortness of breath. The patient is instructed to avoid all heavy lifting, straining, stooping or squatting for 3-5 days. The patient is instructed to watch the cath site for bleeding/oozing; if seen, the patient is instructed to apply firm pressure with a clean cloth and call [facility] at [phone]. The patient is instructed to watch for signs of infection which include: increasing area of redness, fever/hot to touch or purulent drainage at the catheterization site. The patient is instructed not to soak in a bathtub for 7-10 days, but is cleared to shower. The patient is instructed to call the office or return to the ER for immediate evaluation for any shortness of breath or chest pain not relieved by NTG.       Discharge Exam:   Visit Vitals    /65 (BP 1 Location: Left arm, BP Patient Position: At rest)    Pulse 60    Temp 97.9 °F (36.6 °C)    Resp 18    Ht 5' 5\" (1.651 m)    Wt 57.2 kg (126 lb 1.6 oz)    SpO2 99%    BMI 20.98 kg/m2       Patient has been seen by Dr. Zamudio Pole: see his progress note for exam details.     Recent Results (from the past 24 hour(s))   EKG, 12 LEAD, SUBSEQUENT    Collection Time: 07/07/17  9:15 PM   Result Value Ref Range    Ventricular Rate 101 BPM    Atrial Rate 120 BPM    QRS Duration 110 ms    Q-T Interval 352 ms    QTC Calculation (Bezet) 456 ms    Calculated R Axis -10 degrees    Calculated T Axis 123 degrees    Diagnosis       Accelerated Junctional rhythm  Anterolateral infarct (cited on or before 01-JUL-2011)  Abnormal ECG  When compared with ECG of 11-JAN-2015 08:16,  Junctional rhythm has replaced Atrial fibrillation  Questionable change in QRS duration  Questionable change in initial forces of Anterolateral leads     TROPONIN I    Collection Time: 07/07/17 10:40 PM   Result Value Ref Range    Troponin-I, Qt. <0.02 (L) 0.02 - 0.05 NG/ML   MRSA SCREEN - PCR (NASAL)    Collection Time: 07/08/17 12:51 AM   Result Value Ref Range    Special Requests: NO SPECIAL REQUESTS      Culture result:        MRSA target DNA is not detected (presumptive not colonized with MRSA)   METABOLIC PANEL, BASIC    Collection Time: 07/08/17  5:27 AM   Result Value Ref Range    Sodium 143 136 - 145 mmol/L    Potassium 4.6 3.5 - 5.1 mmol/L    Chloride 111 (H) 98 - 107 mmol/L    CO2 23 21 - 32 mmol/L    Anion gap 9 7 - 16 mmol/L    Glucose 91 65 - 100 mg/dL    BUN 29 (H) 8 - 23 MG/DL    Creatinine 0.88 0.6 - 1.0 MG/DL    GFR est AA >60 >60 ml/min/1.73m2    GFR est non-AA >60 >60 ml/min/1.73m2    Calcium 8.9 8.3 - 10.4 MG/DL   HEPATIC FUNCTION PANEL    Collection Time: 07/08/17  5:27 AM   Result Value Ref Range    Protein, total 6.2 (L) 6.3 - 8.2 g/dL    Albumin 3.0 (L) 3.2 - 4.6 g/dL    Globulin 3.2 2.3 - 3.5 g/dL    A-G Ratio 0.9 (L) 1.2 - 3.5      Bilirubin, total 0.2 0.2 - 1.1 MG/DL    Bilirubin, direct <0.1 <0.4 MG/DL Alk. phosphatase 72 50 - 136 U/L    AST (SGOT) 20 15 - 37 U/L    ALT (SGPT) 16 12 - 65 U/L   LIPID PANEL    Collection Time: 07/08/17  5:27 AM   Result Value Ref Range    LIPID PROFILE          Cholesterol, total 147 <200 MG/DL    Triglyceride 40 35 - 150 MG/DL    HDL Cholesterol 83 (H) 40 - 60 MG/DL    LDL, calculated 56 <100 MG/DL    VLDL, calculated 8 6.0 - 23.0 MG/DL    CHOL/HDL Ratio 1.8     TSH 3RD GENERATION    Collection Time: 07/08/17  5:27 AM   Result Value Ref Range    TSH 2.950 0.358 - 3.740 uIU/mL   CBC W/O DIFF    Collection Time: 07/08/17  5:27 AM   Result Value Ref Range    WBC 6.3 4.3 - 11.1 K/uL    RBC 2.93 (L) 4.05 - 5.25 M/uL    HGB 9.5 (L) 11.7 - 15.4 g/dL    HCT 28.9 (L) 35.8 - 46.3 %    MCV 98.6 (H) 79.6 - 97.8 FL    MCH 32.4 26.1 - 32.9 PG    MCHC 32.9 31.4 - 35.0 g/dL    RDW 13.9 11.9 - 14.6 %    PLATELET 521 643 - 543 K/uL    MPV 9.9 (L) 10.8 - 14.1 FL   TROPONIN I    Collection Time: 07/08/17  5:27 AM   Result Value Ref Range    Troponin-I, Qt. <0.02 (L) 0.02 - 0.05 NG/ML   EKG, 12 LEAD, INITIAL    Collection Time: 07/08/17  7:15 AM   Result Value Ref Range    Ventricular Rate 68 BPM    Atrial Rate 68 BPM    P-R Interval 304 ms    QRS Duration 84 ms    Q-T Interval 422 ms    QTC Calculation (Bezet) 448 ms    Calculated P Axis 93 degrees    Calculated R Axis 56 degrees    Calculated T Axis 65 degrees    Diagnosis       Sinus rhythm with 1st degree A-V block  Anteroseptal infarct (cited on or before 01-JUL-2011)  Abnormal ECG  When compared with ECG of 07-JUL-2017 21:15,  Sinus rhythm has replaced Junctional rhythm  Vent. rate has decreased BY  33 BPM  QRS duration has decreased  Serial changes of Anteroseptal infarct Present           Patient Instructions:   Current Discharge Medication List      START taking these medications    Details   pantoprazole (PROTONIX) 40 mg tablet Take 1 Tab by mouth Daily (before breakfast).   Qty: 30 Tab, Refills: 6         CONTINUE these medications which have CHANGED    Details   amiodarone (CORDARONE) 200 mg tablet Take 1 Tab by mouth daily. Qty: 30 Tab, Refills: 3         CONTINUE these medications which have NOT CHANGED    Details   HYDROcodone-acetaminophen (NORCO) 5-325 mg per tablet Take 1 Tab by mouth two (2) times a day. celecoxib (CELEBREX) 100 mg capsule Take  by mouth two (2) times a day. rosuvastatin (CRESTOR) 40 mg tablet Take 40 mg by mouth nightly. clopidogrel (PLAVIX) 75 mg tablet Take 1 Tab by mouth daily. Qty: 90 Tab, Refills: 3      levothyroxine (SYNTHROID) 125 mcg tablet Take 137 mcg by mouth Daily (before breakfast). metoprolol succinate (TOPROL XL) 50 mg XL tablet Take 25 mg by mouth daily. DULoxetine (CYMBALTA) 20 mg capsule Take 20 mg by mouth daily. Calcium Carbonate-Vit D3-Min (CALTRATE 600+D PLUS MINERALS) 600 mg calcium- 400 unit tab Take  by mouth.      gemfibrozil (LOPID) 600 mg tablet Take 600 mg by mouth two (2) times a day. aspirin 81 mg chewable tablet Take 81 mg by mouth daily. ferrous sulfate (SLOW FE) 142 mg (45 mg iron) ER tablet Take  by mouth two (2) times a day. rOPINIRole (REQUIP) 1 mg tablet Take 1 mg by mouth three (3) times daily. 12pm - 3pm -9pm      dextran 70-hypromellose (ARTIFICIAL TEARS) ophthalmic solution Administer 2 drops to both eyes as needed. ascorbic acid (VITAMIN C) 500 mg tablet Take 500 mg by mouth daily. timolol maleate 0.5 % drpd ophthalmic solution Administer 1 drop to both eyes daily. 9 am      latanoprost (XALATAN) 0.005 % ophthalmic solution Administer 1 drop to left eye nightly.                Signed:  Marcelo Liriano PA-C  7/8/2017  3:00 PM

## 2017-07-08 NOTE — DISCHARGE INSTRUCTIONS
Cardiac Catheterization/Angiography Discharge Instructions    *Check the puncture site frequently for swelling or bleeding. If you see any bleeding, lie down and apply pressure over the area with a clean town or washcloth. Notify your doctor for any redness, swelling, drainage or oozing from the puncture site. Notify your doctor for any fever or chills. *If the leg or arm with the puncture becomes cold, numb or painful, call [facility] at [phone]. *Activity should be limited for the next 48 hours. Climb stairs as little as possible and avoid any stooping, bending or strenuous activity for 48 hours. No heavy lifting (anything over 10 pounds) for three days. *Do not drive for 48 hours. *You may resume your usual diet. Drink more fluids than usual.    *Have a responsible person drive you home and stay with you for at least 24 hours after your heart catheterization/angiography. *You may remove the bandage from your right groin in 24 hours. You may shower in 24 hours. No tub baths, hot tubs or swimming for one week. Do not place any lotions, creams, powders, ointments over the puncture site for one week. Heart-Healthy Diet: Care Instructions  Your Care Instructions    A heart-healthy diet has lots of vegetables, fruits, nuts, beans, and whole grains, and is low in salt. It limits foods that are high in saturated fat, such as meats, cheeses, and fried foods. It may be hard to change your diet, but even small changes can lower your risk of heart attack and heart disease. Follow-up care is a key part of your treatment and safety. Be sure to make and go to all appointments, and call your doctor if you are having problems. It's also a good idea to know your test results and keep a list of the medicines you take. How can you care for yourself at home? Watch your portions  · Learn what a serving is. A \"serving\" and a \"portion\" are not always the same thing.  Make sure that you are not eating larger portions than are recommended. For example, a serving of pasta is ½ cup. A serving size of meat is 2 to 3 ounces. A 3-ounce serving is about the size of a deck of cards. Measure serving sizes until you are good at [place]" them. Keep in mind that restaurants often serve portions that are 2 or 3 times the size of one serving. · To keep your energy level up and keep you from feeling hungry, eat often but in smaller portions. · Eat only the number of calories you need to stay at a healthy weight. If you need to lose weight, eat fewer calories than your body burns (through exercise and other physical activity). Eat more fruits and vegetables  · Eat a variety of fruit and vegetables every day. Dark green, deep orange, red, or yellow fruits and vegetables are especially good for you. Examples include spinach, carrots, peaches, and berries. · Keep carrots, celery, and other veggies handy for snacks. Buy fruit that is in season and store it where you can see it so that you will be tempted to eat it. · Cook dishes that have a lot of veggies in them, such as stir-fries and soups. Limit saturated and trans fat  · Read food labels, and try to avoid saturated and trans fats. They increase your risk of heart disease. Trans fat is found in many processed foods such as cookies and crackers. · Use olive or canola oil when you cook. Try cholesterol-lowering spreads, such as Benecol or Take Control. · Bake, broil, grill, or steam foods instead of frying them. · Choose lean meats instead of high-fat meats such as hot dogs and sausages. Cut off all visible fat when you prepare meat. · Eat fish, skinless poultry, and meat alternatives such as soy products instead of high-fat meats. Soy products, such as tofu, may be especially good for your heart. · Choose low-fat or fat-free milk and dairy products. Eat fish  · Eat at least two servings of fish a week.  Certain fish, such as salmon and tuna, contain omega-3 fatty acids, which may help reduce your risk of heart attack. Eat foods high in fiber  · Eat a variety of grain products every day. Include whole-grain foods that have lots of fiber and nutrients. Examples of whole-grain foods include oats, whole wheat bread, and brown rice. · Buy whole-grain breads and cereals, instead of white bread or pastries. Limit salt and sodium  · Limit how much salt and sodium you eat to help lower your blood pressure. · Taste food before you salt it. Add only a little salt when you think you need it. With time, your taste buds will adjust to less salt. · Eat fewer snack items, fast foods, and other high-salt, processed foods. Check food labels for the amount of sodium in packaged foods. · Choose low-sodium versions of canned goods (such as soups, vegetables, and beans). Limit sugar  · Limit drinks and foods with added sugar. These include candy, desserts, and soda pop. Limit alcohol  · Limit alcohol to no more than 2 drinks a day for men and 1 drink a day for women. Too much alcohol can cause health problems. When should you call for help? Watch closely for changes in your health, and be sure to contact your doctor if:  · You would like help planning heart-healthy meals. Where can you learn more? Go to http://bal-rosa isela.info/. Enter V137 in the search box to learn more about \"Heart-Healthy Diet: Care Instructions. \"  Current as of: April 3, 2017  Content Version: 11.3  © 7352-3881 [employer]. Care instructions adapted under license by [employer] (which disclaims liability or warranty for this information). If you have questions about a medical condition or this instruction, always ask your healthcare professional. [place] [address] any warranty or liability for your use of this information.      Gastroesophageal Reflux Disease (GERD): Care Instructions  Your Care Instructions    Gastroesophageal reflux disease (GERD) is the backward flow of stomach acid into the esophagus. The esophagus is the tube that leads from your throat to your stomach. A one-way valve prevents the stomach acid from moving up into this tube. When you have GERD, this valve does not close tightly enough. If you have mild GERD symptoms including heartburn, you may be able to control the problem with antacids or over-the-counter medicine. Changing your diet, losing weight, and making other lifestyle changes can also help reduce symptoms. Follow-up care is a key part of your treatment and safety. Be sure to make and go to all appointments, and call your doctor if you are having problems. Its also a good idea to know your test results and keep a list of the medicines you take. How can you care for yourself at home? · Take your medicines exactly as prescribed. Call your doctor if you think you are having a problem with your medicine. · Your doctor may recommend over-the-counter medicine. For mild or occasional indigestion, antacids, such as Tums, Gaviscon, Mylanta, or Maalox, may help. Your doctor also may recommend over-the-counter acid reducers, such as Pepcid AC, Tagamet HB, Zantac 75, or Prilosec. Read and follow all instructions on the label. If you use these medicines often, talk with your doctor. · Change your eating habits. ¨ Its best to eat several small meals instead of two or three large meals. ¨ After you eat, wait 2 to 3 hours before you lie down. ¨ Chocolate, mint, and alcohol can make GERD worse. ¨ Spicy foods, foods that have a lot of acid (like tomatoes and oranges), and coffee can make GERD symptoms worse in some people. If your symptoms are worse after you eat a certain food, you may want to stop eating that food to see if your symptoms get better. · Do not smoke or chew tobacco. Smoking can make GERD worse. If you need help quitting, talk to your doctor about stop-smoking programs and medicines.  These can increase your chances of quitting for good. · If you have GERD symptoms at night, raise the head of your bed 6 to 8 inches by putting the frame on blocks or placing a foam wedge under the head of your mattress. (Adding extra pillows does not work.)  · Do not wear tight clothing around your middle. · Lose weight if you need to. Losing just 5 to 10 pounds can help. When should you call for help? Call your doctor now or seek immediate medical care if:  · You have new or different belly pain. · Your stools are black and tarlike or have streaks of blood. Watch closely for changes in your health, and be sure to contact your doctor if:  · Your symptoms have not improved after 2 days. · Food seems to catch in your throat or chest.  Where can you learn more? Go to http://bal-rosa isela.info/. Enter V531 in the search box to learn more about \"Gastroesophageal Reflux Disease (GERD): Care Instructions. \"  Current as of: August 9, 2016  Content Version: 11.3  © 9183-7633 [employer]. Care instructions adapted under license by [employer] (which disclaims liability or warranty for this information). If you have questions about a medical condition or this instruction, always ask your healthcare professional. [address] any warranty or liability for your use of this information.

## 2017-07-08 NOTE — PROGRESS NOTES
Monitor room informed that patient had a 2.5 sec pause and patient was sustaining a sinus bradycardia. Kamar Valentino PA-C and MD made aware. No new orders received.

## 2017-07-08 NOTE — PROGRESS NOTES
TRANSFER - OUT REPORT:    Verbal report given to Urmila Myers RN on Daly Hodges  being transferred to [address] [place] [address] for ordered procedure       Report consisted of patients Situation, Background, Assessment and Recommendations(SBAR). Information from the following report(s) SBAR, Intake/Output, MAR, Accordion, Recent Results, Med Rec Status and Cardiac Rhythm NSR was reviewed with the receiving nurse. Opportunity for questions and clarification was provided.

## 2017-07-08 NOTE — PROGRESS NOTES
TRANSFER - OUT REPORT:    Verbal report given to RN(name) on Carmela Deerfield  being transferred to [facility](unit) for routine progression of care       Report consisted of patients Situation, Background, Assessment and   Recommendations(SBAR). Information from the following report(s) Procedure Summary was reviewed with the receiving nurse. Lines:   Peripheral IV 07/07/17 Left Forearm (Active)   Site Assessment Clean, dry, & intact 7/8/2017  8:11 AM   Phlebitis Assessment 0 7/8/2017  8:11 AM   Infiltration Assessment 0 7/8/2017  8:11 AM   Dressing Status Clean, dry, & intact 7/8/2017  8:11 AM   Dressing Type Tape;Transparent 7/8/2017  8:11 AM   Hub Color/Line Status Pink; Infusing 7/8/2017  8:11 AM       Peripheral IV 07/07/17 Right Antecubital (Active)   Site Assessment Clean, dry, & intact 7/8/2017  8:11 AM   Phlebitis Assessment 0 7/8/2017  8:11 AM   Infiltration Assessment 0 7/8/2017  8:11 AM   Dressing Status Old drainage 7/8/2017  8:11 AM   Dressing Type Tape;Transparent 7/8/2017  8:11 AM   Hub Color/Line Status Pink 7/8/2017  8:11 AM        Opportunity for questions and clarification was provided.       Patient transported with:   Registered Nurse     [address] completed by Dr Anu Metz   6 fr RFA    angioseal   50 mcg fentanyl   No intervention needed

## 2017-07-08 NOTE — PROGRESS NOTES
TRANSFER - IN REPORT:    Verbal report received from [facility] (name) on Maria Civil  being received from [facility](unit) for urgent transfer      Report consisted of patients Situation, Background, Assessment and   Recommendations(SBAR). Information from the following report(s) SBAR, Kardex and MAR was reviewed with the receiving nurse. Opportunity for questions and clarification was provided. Assessment completed upon patients arrival to unit and care assumed.

## 2017-07-08 NOTE — PROCEDURES
Etelvina Sebastian 44       Name:  Yayo Simeon   MR#:  [id number]   :  1931   Account #:  [de-identified]   Date of Adm:  2017       PRIMARY CARDIOLOGIST: Taryn Rodrigeuz. Blanca Singh MD    PRIMARY CARE PHYSICIAN:  Dr. Alex Dean    BRIEF HISTORY: Miss Manda Rucker is a very pleasant 43-year-old   female who is admitted with symptoms worrisome for unstable   angina. She is referred for cardiac catheterization. PROCEDURE: After informed consent, she was prepped and draped in   the usual sterile fashion. Initial efforts at left radial access   were unsuccessful, likely due to extremely small left radial   artery size. The artery was cannulated, the wire was only able to   be advanced approximately 5 cm, it would suggest maybe some   tortuosity or loop present within the left radial artery. This was   aborted and manual pressure was applied. Under ultrasound guidance, the right femoral artery was accessed   and a 6-Greek sheath was advanced. A 6-Greek JL4 was utilized   for left coronary injection, a 6-Greek Ifeanyi right was   utilized for right coronary injection, a 6-Greek JR4 was   utilized for saphenous vein graft injection to the right   coronary artery as well as saphenous vein graft injection to the   obtuse marginal. Utilizing exchange wire, the left subclavian was   accessed with the JR4 catheter and the wire was advanced into   the axillary artery. A 4-Greek LIMA catheter was utilized to   engage the left internal mammary artery. A 6-Greek angled   pigtail was utilized for left ventriculography. Isovue contrast   utilized. CONSCIOUS SEDATION: Start time 12:31 p.m. End time 1307. MEDICATIONS: Fentanyl 50 mg. MONITORING REGISTERED NURSE: Judge Beth RN    FINDINGS   1. Left ventricle: Normal left ventricular size, normal left   ventricular systolic function, ejection fraction 65%. There is   no significant mitral regurgitation.  There is no aortic valve   gradient. Left ventricular end-diastolic pressure is measured at   15 mmHg. 2. Left main: Left main is diffusely diseased on the order of   75%, it bifurcates in the LAD and circumflex systems. 3. Left anterior descending coronary: There is 95% proximal   stenosis. 4. Left circumflex coronary: There is a small 1st obtuse   marginal which remains patent without obstruction. The ongoing   left circumflex into the 2nd obtuse marginal has a 95% stenosis   present. 5. Right coronary: It is a moderate sized, diffusely diseased   vessel on the order of 50% to 60%, no high grade stenosis   identified. It is patent to the posterior descending and   posterior lateral branch. 6. Left internal mammary artery to the left anterior descending: It is a moderate sized graft with proximal takeoff to distal   anastomosis, fills the entire LAD diagonal system well. 7. Saphenous vein graft to the 2nd obtuse marginal: This is a   large graft with proximal takeoff, good distal anastomosis,   fills the 2nd obtuse marginal well. 8. Saphenous vein graft to the right coronary artery: It is a   moderate sized graft with proximal takeoff, good distal   anastomosis, fills the entire right coronary well. Successful hemostasis with Angio-Seal closure device. CONCLUSIONS    1. Normal left ventricular systolic function with normal   diastolic filling pressures. 2. Severe 3-vessel native coronary disease. 3. There are 3/3 grafts widely patent. RECOMMENDATIONS: Consideration for noncardiac source of chest   discomfort.         MD MILENA Myers / MODESTO   D:  07/08/2017   13:11   T:  07/08/2017   13:41   Job #:  [id number]

## 2017-07-08 NOTE — PROGRESS NOTES
Verbal bedside report given to oncoming RNKiran. Patient's situation, background, assessment and recommendations provided. Opportunity for questions provided. Oncoming RN assumed care of patient.

## 2017-07-08 NOTE — PROGRESS NOTES
7/8/2017 8:12 AM    Admit Date: 7/7/2017        Subjective:     Ashlee Morin denies chest pain, dyspnea. Has been having exertional CP and pain after dinner Old CABG          Objective:      Visit Vitals    BP 92/55 (BP 1 Location: Left arm, BP Patient Position: At rest)    Pulse 74    Temp 98.1 °F (36.7 °C)    Resp 17    Ht 5' 5\" (1.651 m)    Wt 57.2 kg (126 lb 1.6 oz)    SpO2 95%    BMI 20.98 kg/m2       Physical Exam:  Heart: regular rate and rhythm, S1, S2 normal, no murmur, click, rub or gallop  Lungs: clear to auscultation bilaterally    Data Review:   Labs:    Recent Results (from the past 24 hour(s))   EKG, 12 LEAD, SUBSEQUENT    Collection Time: 07/07/17  9:15 PM   Result Value Ref Range    Ventricular Rate 101 BPM    Atrial Rate 120 BPM    QRS Duration 110 ms    Q-T Interval 352 ms    QTC Calculation (Bezet) 456 ms    Calculated R Axis -10 degrees    Calculated T Axis 123 degrees    Diagnosis       Accelerated Junctional rhythm  Anterolateral infarct (cited on or before 01-JUL-2011)  Abnormal ECG  When compared with ECG of 11-JAN-2015 08:16,  Junctional rhythm has replaced Atrial fibrillation  Questionable change in QRS duration  Questionable change in initial forces of Anterolateral leads     TROPONIN I    Collection Time: 07/07/17 10:40 PM   Result Value Ref Range    Troponin-I, Qt. <0.02 (L) 0.02 - 0.05 NG/ML   MRSA SCREEN - PCR (NASAL)    Collection Time: 07/08/17 12:51 AM   Result Value Ref Range    Special Requests: NO SPECIAL REQUESTS      Culture result:        MRSA target DNA is not detected (presumptive not colonized with MRSA)   METABOLIC PANEL, BASIC    Collection Time: 07/08/17  5:27 AM   Result Value Ref Range    Sodium 143 136 - 145 mmol/L    Potassium 4.6 3.5 - 5.1 mmol/L    Chloride 111 (H) 98 - 107 mmol/L    CO2 23 21 - 32 mmol/L    Anion gap 9 7 - 16 mmol/L    Glucose 91 65 - 100 mg/dL    BUN 29 (H) 8 - 23 MG/DL    Creatinine 0.88 0.6 - 1.0 MG/DL    GFR est AA >60 >60 ml/min/1.73m2    GFR est non-AA >60 >60 ml/min/1.73m2    Calcium 8.9 8.3 - 10.4 MG/DL   HEPATIC FUNCTION PANEL    Collection Time: 07/08/17  5:27 AM   Result Value Ref Range    Protein, total 6.2 (L) 6.3 - 8.2 g/dL    Albumin 3.0 (L) 3.2 - 4.6 g/dL    Globulin 3.2 2.3 - 3.5 g/dL    A-G Ratio 0.9 (L) 1.2 - 3.5      Bilirubin, total 0.2 0.2 - 1.1 MG/DL    Bilirubin, direct <0.1 <0.4 MG/DL    Alk.  phosphatase 72 50 - 136 U/L    AST (SGOT) 20 15 - 37 U/L    ALT (SGPT) 16 12 - 65 U/L   LIPID PANEL    Collection Time: 07/08/17  5:27 AM   Result Value Ref Range    LIPID PROFILE          Cholesterol, total 147 <200 MG/DL    Triglyceride 40 35 - 150 MG/DL    HDL Cholesterol 83 (H) 40 - 60 MG/DL    LDL, calculated 56 <100 MG/DL    VLDL, calculated 8 6.0 - 23.0 MG/DL    CHOL/HDL Ratio 1.8     TSH 3RD GENERATION    Collection Time: 07/08/17  5:27 AM   Result Value Ref Range    TSH 2.950 0.358 - 3.740 uIU/mL   CBC W/O DIFF    Collection Time: 07/08/17  5:27 AM   Result Value Ref Range    WBC 6.3 4.3 - 11.1 K/uL    RBC 2.93 (L) 4.05 - 5.25 M/uL    HGB 9.5 (L) 11.7 - 15.4 g/dL    HCT 28.9 (L) 35.8 - 46.3 %    MCV 98.6 (H) 79.6 - 97.8 FL    MCH 32.4 26.1 - 32.9 PG    MCHC 32.9 31.4 - 35.0 g/dL    RDW 13.9 11.9 - 14.6 %    PLATELET 256 923 - 560 K/uL    MPV 9.9 (L) 10.8 - 14.1 FL   TROPONIN I    Collection Time: 07/08/17  5:27 AM   Result Value Ref Range    Troponin-I, Qt. <0.02 (L) 0.02 - 0.05 NG/ML       Telemetry: normal sinus rhythm first degree AVB          Assessment:     Patient Active Problem List    Diagnosis Date Noted    Unstable angina ([facility] [address] trop neg spoke with Dr Arie Monroe cath 07/07/2017    S/P CABG (coronary artery bypass graft) 09/14/2016    Paroxysmal atrial fibrillation (HCC)on amio back in sinus 10/29/2015    Chronic diastolic heart failure ([facility] [address] 10/29/2015    Irene-Starr tear 01/12/2015    GI bleed 01/11/2015    Leukocytosis 01/11/2015    Constipation- no BM since 7/30 08/05/2011    CAD (coronary artery disease) 08/04/2011    S/p thoracotomy- evauation of hemothorax 08/04/2011    Leucocytosis 08/03/2011    Anemia associated with acute blood loss 07/24/2011    Hyponatremia 07/24/2011    HEMOTHORAX ON LEFT  07/24/2011    Pleural effusion, bilateral 07/23/2011    [facility] 07/13/2011    Hypothyroidism 07/07/2011    Coronary atherosclerosis of native coronary artery--PTCA 1997 LAD/diag 07/01/2011    Mitral valve insufficiency 04/02/2009    Unspecified hypothyroidism 03/30/2009    CVA (cerebrovascular accident) ([facility] 03/29/2009    Hypertension 03/29/2009    Hyperlipemia 03/29/2009       Plan:     [facility]

## 2017-07-08 NOTE — PROGRESS NOTES
2107: Chest pressure 5/10 one nitroglycerin tab given, stat EKG  2111: chest pressure 5/10 one nitroglycerin tab given     2118: chest pressure 3/10, MD Encarnacion in the room,

## 2017-07-08 NOTE — PROGRESS NOTES
Discharge instructions reviewed with patient. Prescriptions given for protonix and amiodarone and med info sheets provided for all new medications. Opportunity for questions provided. Patient voiced understanding of all discharge instructions. IVs removed and monitor off at discharge.

## 2017-07-08 NOTE — PROCEDURES
Brief Cardiac Procedure Note    Patient: Vamsi Gallego MRN: [id number]  SSN: [id number]    YOB: 1931  Age: 80 y.o. Sex: female      Date of Procedure: 7/8/2017     Pre-procedure Diagnosis: Unstable Angina    Post-procedure Diagnosis: Non-cardiac Chest Pain    Procedure: Left Heart Catheterization    Brief Description of Procedure: See note    Performed By: Geoffrey De León MD     Assistants: None    Anesthesia: Moderate Sedation    Estimated Blood Loss: Less than 10 mL      Specimens: None    Implants: None    Findings:   LV:  EF 65%  LM:  75%  LAD:  95% prox  LCx:  NML, 95% OM2  RCA:  40-50% diffuse    LIMA-LAD:  Patent and fills Dx also  SVG-OM2:  Patent  SVG-PDA:  Patent    Complications: None    Recommendations: Continue medical therapy.     Signed By: Geoffrey De León MD     July 8, 2017

## 2017-07-08 NOTE — PROGRESS NOTES
Dual skin assessment completed patient has a scar noted on chest midline and lower back, no redness or breakdown noted to sacrum, scattered bruises noted to BLE, entire right foot is red and blanchable, per patient this is normal for her.

## 2017-07-10 ENCOUNTER — PATIENT OUTREACH (OUTPATIENT)
Dept: CASE MANAGEMENT | Age: 82
End: 2017-07-10

## 2017-07-10 NOTE — PROGRESS NOTES
Care Coordinator spoke with patient's daughter who informed Care Coordinator that patient Mrs. Aleah Jamison has a hospital follow up with PCP  (Dr. Rafael Clark), July 11, 2017 @ 2:15PM. This note will not be viewable in [address]

## 2017-07-10 NOTE — PROGRESS NOTES
This note will not be viewable in [address] Transition of Care Discharge Follow-up Questionnaire   Date/Time of Call:   July 10, 2017 12:10PM   What was the patient hospitalized for? Patient hospitalized for Unstable Angina             Does the patient understand his/her diagnosis and/or treatment and what happened during the hospitalization? Patient states understanding of patient diagnosis and treatment during hospitalization. Did the patient receive discharge instructions? Yes     Review any discharge instructions (see notes in [facility]). Ask patient if they understand these. Do they have any questions? Patient states understanding of discharge instructions, patient states no questions. Were home services ordered (nursing, PT, OT, ST, etc.)? No home health services ordered. If so, has the first visit occurred? If not, why? (Assist with coordination of services if necessary. ) NA         Was any DME ordered? No durable medical equipment ordered. If so, has it been received? If not, why?  (Assist with coordination of arranging DME orders if necessary. ) NA         Complete a review of all medications (new, continued and discontinued meds per the D/C instructions and medication tab in [facility]). Care Coordinator reviewed all medications with patient per [facility], one new medication prescribed, one current medication which have changed. Were all new prescriptions filled? If not, why?  (Assist with obtainment of medications if necessary.) Yes, patient states all prescriptions filled and currently being taken per doctor's order. Does the patient understand the purpose and dosing instructions for all medications? (If patient has questions, provide explanation and education.) Patient states understanding of all current medications and dosing instructions.     Does the patient have any problems in performing ADLs? (If patient is unable to perform ADLs  what is the limiting factor(s)? Do they have a support system that can assist? If no support system is present, discuss possible assistance that they may be able to obtain.) Patient states she is independent with ADL's and ambulation. Patient states she is doing fine. Does the patient have all follow-up appointments scheduled? 7 day f/up with PCP?    7-14 day f/up with specialist?    If f/up has not been made  what actions has the care coordinator made to accomplish this? Has transportation been arranged? [facility] follow-up should be within 7 days of discharge; all others should have PCP follow-up within 7 days of discharge; follow-ups with other specialists should be within 7-14 days of discharge.) No      No, patient Mrs. Jarvis Azevedo states that her daughter Otto Mulligan is in charge of scheduling medical appointments. Care Coordinator called patient's daughter Otto Mulligan @ # [phone] and left voicemail message. Care Coordinator will make several attempts to speak with daughter in reference to scheduling patient follow up appointments. Yes,          Any other questions or concerns expressed by the patient? Patient states no questions or concerns. Schedule next appointment with ELIOT MAYS Coordinator or refer to RN Case Manager/  per the workflow guidelines. When is care coordinators next follow-up call scheduled? If referred for CCM  what RN care manager was the referral assigned?  NA          2 weeks      NA   RONI Call Completed By: EUGENE Cross ACO  Care Coordinator

## 2017-08-29 PROBLEM — R60.0 LOCALIZED EDEMA: Status: ACTIVE | Noted: 2017-08-29

## 2017-12-25 ENCOUNTER — APPOINTMENT (OUTPATIENT)
Dept: ULTRASOUND IMAGING | Age: 82
DRG: 291 | End: 2017-12-25
Attending: INTERNAL MEDICINE
Payer: MEDICARE

## 2017-12-25 ENCOUNTER — APPOINTMENT (OUTPATIENT)
Dept: GENERAL RADIOLOGY | Age: 82
DRG: 291 | End: 2017-12-25
Attending: EMERGENCY MEDICINE
Payer: MEDICARE

## 2017-12-25 ENCOUNTER — HOSPITAL ENCOUNTER (INPATIENT)
Age: 82
LOS: 2 days | Discharge: HOME | DRG: 291 | End: 2017-12-27
Attending: EMERGENCY MEDICINE | Admitting: INTERNAL MEDICINE
Payer: MEDICARE

## 2017-12-25 DIAGNOSIS — J96.01 ACUTE RESPIRATORY FAILURE WITH HYPOXIA (HCC): Primary | ICD-10-CM

## 2017-12-25 PROBLEM — N17.9 ACUTE KIDNEY FAILURE (HCC): Status: ACTIVE | Noted: 2017-12-25

## 2017-12-25 PROBLEM — I50.33 DIASTOLIC CHF, ACUTE ON CHRONIC (HCC): Status: ACTIVE | Noted: 2017-12-25

## 2017-12-25 PROBLEM — I50.9 ACUTE HEART FAILURE (HCC): Status: ACTIVE | Noted: 2017-12-25

## 2017-12-25 LAB
ALBUMIN SERPL-MCNC: 3.3 G/DL (ref 3.2–4.6)
ALBUMIN/GLOB SERPL: 0.8 {RATIO} (ref 1.2–3.5)
ALP SERPL-CCNC: 96 U/L (ref 50–136)
ALT SERPL-CCNC: 16 U/L (ref 12–65)
ANION GAP SERPL CALC-SCNC: 11 MMOL/L (ref 7–16)
AST SERPL-CCNC: 24 U/L (ref 15–37)
ATRIAL RATE: 65 BPM
BILIRUB SERPL-MCNC: 0.3 MG/DL (ref 0.2–1.1)
BNP SERPL-MCNC: 647 PG/ML
BUN SERPL-MCNC: 35 MG/DL (ref 8–23)
CALCIUM SERPL-MCNC: 9.3 MG/DL (ref 8.3–10.4)
CALCULATED P AXIS, ECG09: 74 DEGREES
CALCULATED R AXIS, ECG10: -64 DEGREES
CALCULATED T AXIS, ECG11: 92 DEGREES
CHLORIDE SERPL-SCNC: 109 MMOL/L (ref 98–107)
CO2 SERPL-SCNC: 21 MMOL/L (ref 21–32)
CREAT SERPL-MCNC: 1.24 MG/DL (ref 0.6–1)
DIAGNOSIS, 93000: NORMAL
ERYTHROCYTE [DISTWIDTH] IN BLOOD BY AUTOMATED COUNT: 14.3 % (ref 11.9–14.6)
GLOBULIN SER CALC-MCNC: 3.9 G/DL (ref 2.3–3.5)
GLUCOSE BLD STRIP.AUTO-MCNC: 70 MG/DL (ref 65–100)
GLUCOSE SERPL-MCNC: 205 MG/DL (ref 65–100)
HCT VFR BLD AUTO: 33.6 % (ref 35.8–46.3)
HGB BLD-MCNC: 10.9 G/DL (ref 11.7–15.4)
MCH RBC QN AUTO: 32.6 PG (ref 26.1–32.9)
MCHC RBC AUTO-ENTMCNC: 32.4 G/DL (ref 31.4–35)
MCV RBC AUTO: 100.6 FL (ref 79.6–97.8)
P-R INTERVAL, ECG05: 316 MS
PLATELET # BLD AUTO: 307 K/UL (ref 150–450)
PMV BLD AUTO: 10.2 FL (ref 10.8–14.1)
POTASSIUM SERPL-SCNC: 5.1 MMOL/L (ref 3.5–5.1)
PROT SERPL-MCNC: 7.2 G/DL (ref 6.3–8.2)
Q-T INTERVAL, ECG07: 434 MS
QRS DURATION, ECG06: 134 MS
QTC CALCULATION (BEZET), ECG08: 451 MS
RBC # BLD AUTO: 3.34 M/UL (ref 4.05–5.25)
SODIUM SERPL-SCNC: 141 MMOL/L (ref 136–145)
TROPONIN I BLD-MCNC: 0.01 NG/ML (ref 0.02–0.05)
VENTRICULAR RATE, ECG03: 65 BPM
WBC # BLD AUTO: 13.1 K/UL (ref 4.3–11.1)

## 2017-12-25 PROCEDURE — 93005 ELECTROCARDIOGRAM TRACING: CPT | Performed by: EMERGENCY MEDICINE

## 2017-12-25 PROCEDURE — 93970 EXTREMITY STUDY: CPT

## 2017-12-25 PROCEDURE — 85027 COMPLETE CBC AUTOMATED: CPT | Performed by: EMERGENCY MEDICINE

## 2017-12-25 PROCEDURE — 96374 THER/PROPH/DIAG INJ IV PUSH: CPT | Performed by: EMERGENCY MEDICINE

## 2017-12-25 PROCEDURE — 99285 EMERGENCY DEPT VISIT HI MDM: CPT | Performed by: EMERGENCY MEDICINE

## 2017-12-25 PROCEDURE — 82962 GLUCOSE BLOOD TEST: CPT

## 2017-12-25 PROCEDURE — 74011250636 HC RX REV CODE- 250/636: Performed by: INTERNAL MEDICINE

## 2017-12-25 PROCEDURE — 83605 ASSAY OF LACTIC ACID: CPT

## 2017-12-25 PROCEDURE — 94760 N-INVAS EAR/PLS OXIMETRY 1: CPT

## 2017-12-25 PROCEDURE — 65660000000 HC RM CCU STEPDOWN

## 2017-12-25 PROCEDURE — 83880 ASSAY OF NATRIURETIC PEPTIDE: CPT | Performed by: EMERGENCY MEDICINE

## 2017-12-25 PROCEDURE — 77010033678 HC OXYGEN DAILY

## 2017-12-25 PROCEDURE — 80053 COMPREHEN METABOLIC PANEL: CPT | Performed by: EMERGENCY MEDICINE

## 2017-12-25 PROCEDURE — 74011250636 HC RX REV CODE- 250/636: Performed by: EMERGENCY MEDICINE

## 2017-12-25 PROCEDURE — 71010 XR CHEST PORT: CPT

## 2017-12-25 PROCEDURE — 74011250637 HC RX REV CODE- 250/637: Performed by: INTERNAL MEDICINE

## 2017-12-25 PROCEDURE — 84484 ASSAY OF TROPONIN QUANT: CPT

## 2017-12-25 RX ORDER — LISINOPRIL 5 MG/1
5 TABLET ORAL DAILY
Status: DISCONTINUED | OUTPATIENT
Start: 2017-12-25 | End: 2017-12-27 | Stop reason: HOSPADM

## 2017-12-25 RX ORDER — HEPARIN SODIUM 5000 [USP'U]/ML
5000 INJECTION, SOLUTION INTRAVENOUS; SUBCUTANEOUS EVERY 8 HOURS
Status: DISCONTINUED | OUTPATIENT
Start: 2017-12-25 | End: 2017-12-27 | Stop reason: HOSPADM

## 2017-12-25 RX ORDER — ROSUVASTATIN CALCIUM 20 MG/1
40 TABLET, COATED ORAL
Status: DISCONTINUED | OUTPATIENT
Start: 2017-12-25 | End: 2017-12-27 | Stop reason: HOSPADM

## 2017-12-25 RX ORDER — HYDRALAZINE HYDROCHLORIDE 20 MG/ML
10 INJECTION INTRAMUSCULAR; INTRAVENOUS
Status: DISCONTINUED | OUTPATIENT
Start: 2017-12-25 | End: 2017-12-27 | Stop reason: HOSPADM

## 2017-12-25 RX ORDER — AMIODARONE HYDROCHLORIDE 200 MG/1
200 TABLET ORAL DAILY
Status: DISCONTINUED | OUTPATIENT
Start: 2017-12-26 | End: 2017-12-27 | Stop reason: HOSPADM

## 2017-12-25 RX ORDER — METOPROLOL SUCCINATE 25 MG/1
25 TABLET, EXTENDED RELEASE ORAL DAILY
Status: DISCONTINUED | OUTPATIENT
Start: 2017-12-25 | End: 2017-12-27 | Stop reason: HOSPADM

## 2017-12-25 RX ORDER — FERROUS SULFATE 300 MG/5ML
300 LIQUID (ML) ORAL 2 TIMES DAILY WITH MEALS
Status: DISCONTINUED | OUTPATIENT
Start: 2017-12-25 | End: 2017-12-27 | Stop reason: HOSPADM

## 2017-12-25 RX ORDER — HYDROCODONE BITARTRATE AND ACETAMINOPHEN 5; 325 MG/1; MG/1
1 TABLET ORAL
Status: DISCONTINUED | OUTPATIENT
Start: 2017-12-25 | End: 2017-12-27 | Stop reason: HOSPADM

## 2017-12-25 RX ORDER — ROPINIROLE 1 MG/1
1 TABLET, FILM COATED ORAL 3 TIMES DAILY
Status: DISCONTINUED | OUTPATIENT
Start: 2017-12-25 | End: 2017-12-27 | Stop reason: HOSPADM

## 2017-12-25 RX ORDER — PANTOPRAZOLE SODIUM 40 MG/1
40 TABLET, DELAYED RELEASE ORAL
Status: DISCONTINUED | OUTPATIENT
Start: 2017-12-26 | End: 2017-12-27 | Stop reason: HOSPADM

## 2017-12-25 RX ORDER — DOCUSATE SODIUM 100 MG/1
100 CAPSULE, LIQUID FILLED ORAL
Status: DISCONTINUED | OUTPATIENT
Start: 2017-12-25 | End: 2017-12-27 | Stop reason: HOSPADM

## 2017-12-25 RX ORDER — GEMFIBROZIL 600 MG/1
600 TABLET, FILM COATED ORAL 2 TIMES DAILY
Status: DISCONTINUED | OUTPATIENT
Start: 2017-12-25 | End: 2017-12-27 | Stop reason: HOSPADM

## 2017-12-25 RX ORDER — GUAIFENESIN 100 MG/5ML
81 LIQUID (ML) ORAL DAILY
Status: DISCONTINUED | OUTPATIENT
Start: 2017-12-26 | End: 2017-12-27 | Stop reason: HOSPADM

## 2017-12-25 RX ORDER — HYDROCODONE BITARTRATE AND ACETAMINOPHEN 5; 325 MG/1; MG/1
1 TABLET ORAL 2 TIMES DAILY
Status: DISCONTINUED | OUTPATIENT
Start: 2017-12-25 | End: 2017-12-25

## 2017-12-25 RX ORDER — CLOPIDOGREL BISULFATE 75 MG/1
75 TABLET ORAL DAILY
Status: DISCONTINUED | OUTPATIENT
Start: 2017-12-26 | End: 2017-12-27 | Stop reason: HOSPADM

## 2017-12-25 RX ORDER — FUROSEMIDE 10 MG/ML
40 INJECTION INTRAMUSCULAR; INTRAVENOUS
Status: COMPLETED | OUTPATIENT
Start: 2017-12-25 | End: 2017-12-25

## 2017-12-25 RX ORDER — LEVOTHYROXINE SODIUM 112 UG/1
100 TABLET ORAL
COMMUNITY

## 2017-12-25 RX ORDER — FUROSEMIDE 10 MG/ML
40 INJECTION INTRAMUSCULAR; INTRAVENOUS 2 TIMES DAILY
Status: DISCONTINUED | OUTPATIENT
Start: 2017-12-25 | End: 2017-12-26

## 2017-12-25 RX ORDER — DULOXETIN HYDROCHLORIDE 20 MG/1
20 CAPSULE, DELAYED RELEASE ORAL DAILY
Status: DISCONTINUED | OUTPATIENT
Start: 2017-12-26 | End: 2017-12-27 | Stop reason: HOSPADM

## 2017-12-25 RX ADMIN — HEPARIN SODIUM 5000 UNITS: 5000 INJECTION, SOLUTION INTRAVENOUS; SUBCUTANEOUS at 14:07

## 2017-12-25 RX ADMIN — Medication 1 AMPULE: at 16:59

## 2017-12-25 RX ADMIN — ROPINIROLE HYDROCHLORIDE 1 MG: 1 TABLET, FILM COATED ORAL at 14:06

## 2017-12-25 RX ADMIN — HEPARIN SODIUM 5000 UNITS: 5000 INJECTION, SOLUTION INTRAVENOUS; SUBCUTANEOUS at 21:22

## 2017-12-25 RX ADMIN — ROPINIROLE HYDROCHLORIDE 1 MG: 1 TABLET, FILM COATED ORAL at 21:22

## 2017-12-25 RX ADMIN — ROSUVASTATIN CALCIUM 40 MG: 20 TABLET, FILM COATED ORAL at 21:22

## 2017-12-25 RX ADMIN — HYDROCODONE BITARTRATE AND ACETAMINOPHEN 1 TABLET: 5; 325 TABLET ORAL at 19:51

## 2017-12-25 RX ADMIN — LISINOPRIL 5 MG: 5 TABLET ORAL at 14:06

## 2017-12-25 RX ADMIN — MINERAL SUPPLEMENT IRON 300 MG / 5 ML STRENGTH LIQUID 100 PER BOX UNFLAVORED 300 MG: at 16:59

## 2017-12-25 RX ADMIN — FUROSEMIDE 40 MG: 10 INJECTION, SOLUTION INTRAMUSCULAR; INTRAVENOUS at 17:22

## 2017-12-25 RX ADMIN — GEMFIBROZIL 600 MG: 600 TABLET ORAL at 16:58

## 2017-12-25 RX ADMIN — Medication 1 AMPULE: at 21:23

## 2017-12-25 RX ADMIN — FUROSEMIDE 40 MG: 10 INJECTION, SOLUTION INTRAMUSCULAR; INTRAVENOUS at 10:45

## 2017-12-25 RX ADMIN — METOPROLOL SUCCINATE 25 MG: 25 TABLET, EXTENDED RELEASE ORAL at 14:06

## 2017-12-25 NOTE — H&P
H and P dictated, job ID #[id number]  Admit for DHF, IV diuresis. Check echo, follow labs.    Cherry Alcala

## 2017-12-25 NOTE — ED NOTES
TRANSFER - OUT REPORT:    Verbal report given to Wilder Key (name) on Merlene Barrera  being transferred to [facility](unit) for routine progression of care       Report consisted of patients Situation, Background, Assessment and   Recommendations(SBAR). Information from the following report(s) SBAR, Kardex, ED Summary and Recent Results was reviewed with the receiving nurse. Lines:   Peripheral IV 12/25/17 Left Antecubital (Active)   Site Assessment Clean, dry, & intact 12/25/2017 10:12 AM   Phlebitis Assessment 0 12/25/2017 10:12 AM   Infiltration Assessment 0 12/25/2017 10:12 AM   Hub Color/Line Status Green 12/25/2017 10:12 AM   Alcohol Cap Used No 12/25/2017 10:12 AM        Opportunity for questions and clarification was provided.       Patient transported with:   O2 @ 4 liters

## 2017-12-25 NOTE — IP AVS SNAPSHOT
Hernandez Rosas 
 
 
 [address] [facility] [address] [facility] 
[phone] Patient: Madeline Rangel MRN: [id number] BFZ:[id number] My Medications TAKE these medications as instructed Instructions Each Dose to Equal  
 Morning Noon Evening Bedtime  
 amiodarone 200 mg tablet Commonly known as:  CORDARONE Take 1 Tab by mouth daily. 200 mg ARTIFICIAL TEARS([id number]) ophthalmic solution Generic drug:  dextran 70-hypromellose Administer 2 drops to both eyes as needed. 2 Drop  
    
   
   
   
  
 aspirin 81 mg chewable tablet Take 81 mg by mouth daily. 81 mg CALTRATE 600+D PLUS MINERALS 600 mg calcium- 400 unit Tab Generic drug:  Calcium Carbonate-Vit D3-Min Take  by mouth. celecoxib 100 mg capsule Commonly known as:  CELEBREX Take  by mouth two (2) times a day. clopidogrel 75 mg Tab Commonly known as:  PLAVIX Take 1 Tab by mouth daily. 75 mg  
    
  
   
   
   
  
 CRESTOR 40 mg tablet Generic drug:  rosuvastatin Take 40 mg by mouth nightly. 40 mg DULoxetine 20 mg capsule Commonly known as:  CYMBALTA Take 20 mg by mouth daily. 20 mg  
    
  
   
   
   
  
 ferrous sulfate 142 mg (45 mg iron) ER tablet Commonly known as:  SLOW FE Take  by mouth two (2) times a day. furosemide 40 mg tablet Commonly known as:  LASIX Take 1 Tab by mouth daily. 40 mg  
    
  
   
   
   
  
 gemfibrozil 600 mg tablet Commonly known as:  LOPID Take 600 mg by mouth two (2) times a day. 600 mg HYDROcodone-acetaminophen 5-325 mg per tablet Commonly known as:  [address] Take 1 Tab by mouth two (2) times a day. 1 Tab metoprolol succinate 25 mg XL tablet Commonly known as:  TOPROL-XL Take  by mouth daily. pantoprazole 40 mg tablet Commonly known as:  PROTONIX Take 1 Tab by mouth Daily (before breakfast). 40 mg  
    
  
   
   
   
  
 REQUIP 1 mg tablet Generic drug:  rOPINIRole Take 1 mg by mouth three (3) times daily. 12pm - 3pm -9pm  
 1 mg SYNTHROID 137 mcg tablet Generic drug:  levothyroxine Take  by mouth Daily (before breakfast). timolol maleate 0.5 % Drpd ophthalmic solution Administer 1 drop to both eyes daily. 9 am  
 1 Drop VITAMIN C 500 mg tablet Generic drug:  ascorbic acid (vitamin C) Take 500 mg by mouth daily. 500 mg  
    
  
   
   
   
  
 XALATAN 0.005 % ophthalmic solution Generic drug:  latanoprost  
   
 Administer 1 Drop to both eyes nightly. 1 Drop Where to Get Your Medications Information on where to get these meds will be given to you by the nurse or doctor. ! Ask your nurse or doctor about these medications  
  furosemide 40 mg tablet

## 2017-12-25 NOTE — ED PROVIDER NOTES
HPI Comments: 70-year-old lady with a history of shortness of breath that she says Maikel and worse over last 24 hours. Patient says she just felt she couldn't get any air and she felt very restless. On EMS arrival she was extremely hyper intensive and she was found to have O2 sats in the 80s. She also that her blood sugar close to 300 with no apparent history of diabetes. She received an albuterol treatment as well as some fluids. Elements of this note were created using speech recognition software. As such, errors of speech recognition may be present. Patient is a 80 y.o. female presenting with respiratory distress syndrome. The history is provided by the patient. Respiratory Distress   Associated symptoms include cough. Pertinent negatives include no fever, no headaches, no rhinorrhea, no sore throat, no wheezing, no chest pain, no vomiting, no abdominal pain and no rash.         Past Medical History:   Diagnosis Date    Anemia associated with acute blood loss 7/24/2011    Atrial fibrillation ([address] 7/13/2011    CAD (coronary artery disease) 1997    MI, PCI , CABG 7/13/2011    Chronic diastolic heart failure ([address] 10/29/2015    Chronic pain     Constipation- no BM since 7/30 8/5/2011    Coronary atherosclerosis of native coronary artery--PTCA 1997 LAD/diag 7/1/2011    CVA (cerebrovascular accident) ([address] 3/29/2009    mild memory issues, rls, afib    GI bleed 1/11/2015    HEMOTHORAX ON LEFT  7/24/2011    Hyperlipemia 3/29/2009    Hypertension 3/29/2009    Hypertension, benign 10/29/2015    Hyponatremia 7/24/2011    Hypothyroidism 7/7/2011    Leucocytosis 8/3/2011    Leukocytosis 1/11/2015    Irene-Starr tear 1/12/2015    Mitral valve insufficiency 4/2/2009    Paroxysmal atrial fibrillation ([address] 10/29/2015    Pleural effusion, bilateral 7/23/2011    Right thorocetesis 1200ml removed 7/23/11 and 1000ml removed 7/28/11 Left thorocentesis 1100ml removed 7/23/11     S/p thoracotomy- evauation of hemothorax 8/4/2011    Unspecified hypothyroidism 3/30/2009       Past Surgical History:   Procedure Laterality Date    CABG, ARTERY-VEIN, THREE  7/7/2011    HX BACK SURGERY      HX ORTHOPAEDIC  4 years ago    neck    HX ORTHOPAEDIC  10 years    back    HX OTHER SURGICAL      steroid injection neck, 2 weeks ago         Family History:   Problem Relation Age of Onset    Heart Attack Father 36       Social History     Social History    Marital status:      Spouse name: N/A    Number of children: N/A    Years of education: N/A     Occupational History    Not on file. Social History Main Topics    Smoking status: Former Smoker     Quit date: 1951    Smokeless tobacco: Never Used    Alcohol use No    Drug use: No    Sexual activity: No     Other Topics Concern    Not on file     Social History Narrative         ALLERGIES: Versed [midazolam]    Review of Systems   Constitutional: Positive for activity change. Negative for chills, diaphoresis and fever. HENT: Negative for congestion, rhinorrhea and sore throat. Eyes: Negative for redness and visual disturbance. Respiratory: Positive for cough and shortness of breath. Negative for chest tightness and wheezing. Cardiovascular: Negative for chest pain and palpitations. Gastrointestinal: Negative for abdominal pain, blood in stool, diarrhea, nausea and vomiting. Endocrine: Negative for polydipsia and polyuria. Genitourinary: Negative for dysuria and hematuria. Musculoskeletal: Negative for arthralgias, myalgias and neck stiffness. Skin: Negative for rash. Allergic/Immunologic: Negative for environmental allergies and food allergies. Neurological: Negative for dizziness, weakness and headaches. Hematological: Negative for adenopathy. Does not bruise/bleed easily. Psychiatric/Behavioral: Negative for confusion and sleep disturbance. The patient is not nervous/anxious.         Vitals:    12/25/17 0856   SpO2: (!) 82%            Physical Exam   Constitutional: She is oriented to person, place, and time. She appears well-developed and well-nourished. HENT:   Head: Normocephalic and atraumatic. Eyes: Conjunctivae and EOM are normal. Pupils are equal, round, and reactive to light. Neck: Normal range of motion. Cardiovascular: Normal rate and regular rhythm. Pulmonary/Chest: Effort normal. No respiratory distress. She has no wheezes. She has rales. She exhibits no tenderness. Diffuse rales and crackles   Abdominal: Soft. Bowel sounds are normal. There is no rebound and no guarding. Musculoskeletal: Normal range of motion. She exhibits edema. She exhibits no tenderness. Bilateral lower extremity edema 2+ pitting edema   Lymphadenopathy:     She has no cervical adenopathy. Neurological: She is alert and oriented to person, place, and time. Skin: Skin is warm and dry. Psychiatric: She has a normal mood and affect. Nursing note and vitals reviewed. MDM  Number of Diagnoses or Management Options  Diagnosis management comments: Patient's BNP was significantly elevated and her chest x-ray was consistent with pulmonary edema. Given her oxygen requirements I will  Discuss the case with cardiology for an admission. I reviewed the patient's recent heart catheter and echocardiogram which showed an ejection fraction of 60-65% with  Mild right ventricular systolic dysfunction. Seems a previous history of congestive heart failure would not have been severe enough to cause her current symptoms but she does have pulmonary edema and an elevated BNP. She did not have any chest pain to suggest a PE to me. I discussed the case with cardiology who kindly agreed to evaluate the patient.     ED Course       Procedures

## 2017-12-25 NOTE — IP AVS SNAPSHOT
Denny Jenkinsmegan 
 
 
 [address] [facility] [address] [facility] 
[phone] Patient: Rashida Iqbal MRN: [id number] KCF:0/1/8522 About your hospitalization You were admitted on:  December 25, 2017 You last received care in the:  [facility] 3 TELEMETRY You were discharged on:  December 27, 2017 Why you were hospitalized Your primary diagnosis was:  Acute Respiratory Failure With Hypoxia (Hcc) Your diagnoses also included:  Diastolic Chf, Acute On Chronic (Hcc), Acute Kidney Failure (Hcc), Cva (Cerebrovascular Accident) (Hcc), Mitral Valve Insufficiency, Atrial Fibrillation (Hcc), Cad (Coronary Artery Disease), Chronic Diastolic Heart Failure (Hcc), Localized Edema, Acute Heart Failure (Hcc) Things You Need To Do (next 8 weeks) Wednesday Jan 03, 2018 Follow up with Reymundo Bowman MD  
11:30 Phone:  [phone] Where:  [address] [facility] [address] Thursday Jan 04, 2018 HOSPITAL FOLLOW-UP with Walter Koroma MD at 10:15 AM  
Where:  [facility] ([address] [place] [address]) Discharge Orders Procedure Order Date Status Priority Quantity Spec Type Associated Dx METABOLIC PANEL, BASIC 52/61/74 1043 Future Routine 1 Blood Comments:  BMP and Mg in 5 days Dx: Diastolic CHF, new Lasix dose A check shira indicates which time of day the medication should be taken. My Medications TAKE these medications as instructed Instructions Each Dose to Equal  
 Morning Noon Evening Bedtime  
 amiodarone 200 mg tablet Commonly known as:  CORDARONE Take 1 Tab by mouth daily. 200 mg ARTIFICIAL TEARS([id number]) ophthalmic solution Generic drug:  dextran 70-hypromellose Administer 2 drops to both eyes as needed. 2 Drop  
    
   
   
   
  
 aspirin 81 mg chewable tablet Take 81 mg by mouth daily. 81 mg CALTRATE 600+D PLUS MINERALS 600 mg calcium- 400 unit Tab Generic drug:  Calcium Carbonate-Vit D3-Min Take  by mouth. celecoxib 100 mg capsule Commonly known as:  CELEBREX Take  by mouth two (2) times a day. clopidogrel 75 mg Tab Commonly known as:  PLAVIX Take 1 Tab by mouth daily. 75 mg  
    
  
   
   
   
  
 CRESTOR 40 mg tablet Generic drug:  rosuvastatin Take 40 mg by mouth nightly. 40 mg DULoxetine 20 mg capsule Commonly known as:  CYMBALTA Take 20 mg by mouth daily. 20 mg  
    
  
   
   
   
  
 ferrous sulfate 142 mg (45 mg iron) ER tablet Commonly known as:  SLOW FE Take  by mouth two (2) times a day. furosemide 40 mg tablet Commonly known as:  LASIX Take 1 Tab by mouth daily. 40 mg  
    
  
   
   
   
  
 gemfibrozil 600 mg tablet Commonly known as:  LOPID Take 600 mg by mouth two (2) times a day. 600 mg HYDROcodone-acetaminophen 5-325 mg per tablet Commonly known as:  Lauren Fonseca Take 1 Tab by mouth two (2) times a day. 1 Tab  
    
  
   
   
  
   
  
 metoprolol succinate 25 mg XL tablet Commonly known as:  TOPROL-XL Take  by mouth daily. pantoprazole 40 mg tablet Commonly known as:  PROTONIX Take 1 Tab by mouth Daily (before breakfast). 40 mg  
    
  
   
   
   
  
 REQUIP 1 mg tablet Generic drug:  rOPINIRole Take 1 mg by mouth three (3) times daily. 12pm - 3pm -9pm  
 1 mg SYNTHROID 137 mcg tablet Generic drug:  levothyroxine Take  by mouth Daily (before breakfast). timolol maleate 0.5 % Drpd ophthalmic solution Administer 1 drop to both eyes daily. 9 am  
 1 Drop VITAMIN C 500 mg tablet Generic drug:  ascorbic acid (vitamin C) Take 500 mg by mouth daily. 500 mg  
    
  
   
   
   
  
 XALATAN 0.005 % ophthalmic solution Generic drug:  latanoprost  
   
 Administer 1 Drop to both eyes nightly. 1 Drop Where to Get Your Medications Information on where to get these meds will be given to you by the nurse or doctor. ! Ask your nurse or doctor about these medications  
  furosemide 40 mg tablet Discharge Instructions *  Please give a list of your current medications to your Primary Care Provider. *  Please update this list whenever your medications are discontinued, doses are 
    changed, or new medications (including over-the-counter products) are added. *  Please carry medication information at all times in case of emergency situations. These are general instructions for a healthy lifestyle: No smoking/ No tobacco products/ Avoid exposure to second hand smoke Surgeon General's Warning:  Quitting smoking now greatly reduces serious risk to your health. Obesity, smoking, and sedentary lifestyle greatly increases your risk for illness A healthy diet, regular physical exercise & weight monitoring are important for maintaining a healthy lifestyle You may be retaining fluid if you have a history of heart failure or if you experience any of the following symptoms:  Weight gain of 3 pounds or more overnight or 5 pounds in a week, increased swelling in our hands or feet or shortness of breath while lying flat in bed. Please call your doctor as soon as you notice any of these symptoms; do not wait until your next office visit. Recognize signs and symptoms of STROKE: 
 
F-face looks uneven A-arms unable to move or move unevenly S-speech slurred or non-existent T-time-call 911 as soon as signs and symptoms begin-DO NOT go  
 Back to bed or wait to see if you get better-TIME IS BRAIN. Warning Signs of HEART ATTACK Call 911 if you have these symptoms: 
? Chest discomfort. Most heart attacks involve discomfort in the center of the chest that lasts more than a few minutes, or that goes away and comes back. It can feel like uncomfortable pressure, squeezing, fullness, or pain. ? Discomfort in other areas of the upper body. Symptoms can include pain or discomfort in one or both arms, the back, neck, jaw, or stomach. ? Shortness of breath with or without chest discomfort. ? Other signs may include breaking out in a cold sweat, nausea, or lightheadedness. Don't wait more than five minutes to call [address]! Fast action can save your life. Calling 911 is almost always the fastest way to get lifesaving treatment. Emergency Medical Services staff can begin treatment when they arrive  up to an hour sooner than if someone gets to the hospital by car. The discharge information has been reviewed with the patient. The patient verbalized understanding. Discharge medications reviewed with the patient and appropriate educational materials and side effects teaching were provided. ___________________________________________________________________________________________________________________________________ Heart Failure: Care Instructions Your Care Instructions Heart failure occurs when your heart does not pump as much blood as the body needs. Failure does not mean that the heart has stopped pumping but rather that it is not pumping as well as it should. Over time, this causes fluid buildup in your lungs and other parts of your body. Fluid buildup can cause shortness of breath, fatigue, swollen ankles, and other problems. By taking medicines regularly, reducing sodium (salt) in your diet, checking your weight every day, and making lifestyle changes, you can feel better and live longer. Follow-up care is a key part of your treatment and safety. Be sure to make and go to all appointments, and call your doctor if you are having problems. It's also a good idea to know your test results and keep a list of the medicines you take. How can you care for yourself at home? Medicines ? · Be safe with medicines. Take your medicines exactly as prescribed. Call your doctor if you think you are having a problem with your medicine. ? · Do not take any vitamins, over-the-counter medicine, or herbal products without talking to your doctor first. Tom Liz not take ibuprofen (Advil or Motrin) and naproxen (Aleve) without talking to your doctor first. They could make your heart failure worse. ? · You may be taking some of the following medicine. ¨ Beta-blockers can slow heart rate, decrease blood pressure, and improve your condition. Taking a beta-blocker may lower your chance of needing to be hospitalized. ¨ Angiotensin-converting enzyme inhibitors (ACEIs) reduce the heart's workload, lower blood pressure, and reduce swelling. Taking an ACEI may lower your chance of needing to be hospitalized again. ¨ Angiotensin II receptor blockers (ARBs) work like ACEIs. Your doctor may prescribe them instead of ACEIs. ¨ Diuretics, also called water pills, reduce swelling. ¨ Potassium supplements replace this important mineral, which is sometimes lost with diuretics. ¨ Aspirin and other blood thinners prevent blood clots, which can cause a stroke or heart attack. ? You will get more details on the specific medicines your doctor prescribes. Diet ? · Your doctor may suggest that you limit sodium to 2,000 milligrams (mg) a day or less. That is less than 1 teaspoon of salt a day, including all the salt you eat in cooking or in packaged foods. People get most of their sodium from processed foods. Fast food and restaurant meals also tend to be very high in sodium. ? · Ask your doctor how much liquid you can drink each day. You may have to limit liquids. ?Weight ? · Weigh yourself without clothing at the same time each day. Record your weight. Call your doctor if you have a sudden weight gain, such as more than 2 to 3 pounds in a day or 5 pounds in a week. (Your doctor may suggest a different range of weight gain.) A sudden weight gain may mean that your heart failure is getting worse. ? Activity level ? · Start light exercise (if your doctor says it is okay). Even if you can only do a small amount, exercise will help you get stronger, have more energy, and manage your weight and your stress. Walking is an easy way to get exercise. Start out by walking a little more than you did before. Bit by bit, increase the amount you walk. ? · When you exercise, watch for signs that your heart is working too hard. You are pushing yourself too hard if you cannot talk while you are exercising. If you become short of breath or dizzy or have chest pain, stop, sit down, and rest.  
? · If you feel \"wiped out\" the day after you exercise, walk slower or for a shorter distance until you can work up to a better pace. ? · Get enough rest at night. Sleeping with 1 or 2 pillows under your upper body and head may help you breathe easier. ? Lifestyle changes ? · Do not smoke. Smoking can make a heart condition worse. If you need help quitting, talk to your doctor about stop-smoking programs and medicines. These can increase your chances of quitting for good. Quitting smoking may be the most important step you can take to protect your heart. ? · Limit alcohol to 2 drinks a day for men and 1 drink a day for women. Too much alcohol can cause health problems. ? · Avoid getting sick from colds and the flu. Get a pneumococcal vaccine shot. If you have had one before, ask your doctor whether you need another dose. Get a flu shot each year.  If you must be around people with colds or the flu, wash your hands often. When should you call for help? Call 911 if you have symptoms of sudden heart failure such as: 
? · You have severe trouble breathing. ? · You cough up pink, foamy mucus. ? · You have a new irregular or rapid heartbeat. ?Call your doctor now or seek immediate medical care if: 
? · You have new or increased shortness of breath. ? · You are dizzy or lightheaded, or you feel like you may faint. ? · You have sudden weight gain, such as more than 2 to 3 pounds in a day or 5 pounds in a week. (Your doctor may suggest a different range of weight gain.) ? · You have increased swelling in your legs, ankles, or feet. ? · You are suddenly so tired or weak that you cannot do your usual activities. ? Watch closely for changes in your health, and be sure to contact your doctor if you develop new symptoms. Where can you learn more? Go to http://bal-rosa isela.info/. Enter R879 in the search box to learn more about \"Heart Failure: Care Instructions. \" Current as of: September 21, 2016 Content Version: 11.4 © 4475-2853 [employer]. Care instructions adapted under license by [employer] (which disclaims liability or warranty for this information). If you have questions about a medical condition or this instruction, always ask your healthcare professional. [address] any warranty or liability for your use of this information. [employer] Activation Thank you for enrolling in [address] Please follow the instructions below to securely access your online medical record. [employer] allows you to send messages to your doctor, view your test results, renew your prescriptions, schedule appointments, and more. How Do I Sign Up? 1. In your internet browser, go to [email or web address]. 2. Click on the First Time User? Click Here link in the Sign In box. You will see the New Member Sign Up page. 3. Enter your Service Seeking Access Code exactly as it appears below. You will not need to use this code after youve completed the sign-up process. If you do not sign up before the expiration date, you must request a new code. Service Seeking Access Code: [email or web address] Expires: 3/25/2018  8:59 AM  
 
4. Enter the last four digits of your Social Security Number (xxxx) and Date of Birth (mm/dd/yyyy) as indicated and click Submit. You will be taken to the next sign-up page. 5. Create a Service Seeking ID. This will be your Service Seeking login ID and cannot be changed, so think of one that is secure and easy to remember. 6. Create a Service Seeking password. You can change your password at any time. 7. Enter your Password Reset Question and Answer. This can be used at a later time if you forget your password. 8. Enter your e-mail address. You will receive e-mail notification when new information is available in [address] 9. Click Sign Up. You can now view your medical record. Additional Information Remember, Service Seeking is NOT to be used for urgent needs. For medical emergencies, dial 911. Now available from your iPhone and Android! Service Seeking Announcement We are excited to announce that we are making your provider's discharge notes available to you in Service Seeking. You will see these notes when they are completed and signed by the physician that discharged you from your recent hospital stay. If you have any questions or concerns about any information you see in Service Seeking, please call the Health Information Department where you were seen or reach out to your Primary Care Provider for more information about your plan of care. Introducing [facility]! [address] introduces Service Seeking patient portal. Now you can access parts of your medical record, email your doctor's office, and request medication refills online. 1. In your internet browser, go to [email or web address] 2. Click on the First Time User? Click Here link in the Sign In box. You will see the New Member Sign Up page. 3. Enter your Linkyt Access Code exactly as it appears below. You will not need to use this code after youve completed the sign-up process. If you do not sign up before the expiration date, you must request a new code. · Linkyt Access Code: [email or web address] Expires: 3/25/2018  8:59 AM 
 
4. Enter the last four digits of your Social Security Number (xxxx) and Date of Birth (mm/dd/yyyy) as indicated and click Submit. You will be taken to the next sign-up page. 5. Create a Linkyt ID. This will be your Linkyt login ID and cannot be changed, so think of one that is secure and easy to remember. 6. Create a Linkyt password. You can change your password at any time. 7. Enter your Password Reset Question and Answer. This can be used at a later time if you forget your password. 8. Enter your e-mail address. You will receive e-mail notification when new information is available in [address] 9. Click Sign Up. You can now view and download portions of your medical record. 10. Click the Download Summary menu link to download a portable copy of your medical information. If you have questions, please visit the Frequently Asked Questions section of the Linkyt website. Remember, Linkyt is NOT to be used for urgent needs. For medical emergencies, dial 911. Now available from your iPhone and Android! Providers Seen During Your Hospitalization Provider Specialty Primary office phone Amina Juan MD Emergency Medicine [phone] Juliana Rod DO Cardiology [phone] Your Primary Care Physician (PCP) Primary Care Physician Office Phone Office Fax Janeth davis [facility] [phone] You are allergic to the following Allergen Reactions Versed (Midazolam) Anxiety Delirium Recent Documentation Height Weight Breastfeeding? BMI OB Status Smoking Status 1.524 m 52.8 kg No 22.71 kg/m2 Postmenopausal Former Smoker Emergency Contacts Name Discharge Info Relation Home Work Mobile Emely Suh  Child [2] [phone] Patient Belongings The following personal items are in your possession at time of discharge: 
  Dental Appliances: None  Visual Aid: Glasses      Home Medications: None   Jewelry: Watch, Ring, With patient (4 rings)  Clothing: Undergarments    Other Valuables: Sent home Please provide this summary of care documentation to your next provider. Signatures-by signing, you are acknowledging that this After Visit Summary has been reviewed with you and you have received a copy. Patient Signature:  ____________________________________________________________ Date:  ____________________________________________________________  
  
Pavan Trevinove Provider Signature:  ____________________________________________________________ Date:  ____________________________________________________________

## 2017-12-25 NOTE — H&P
[facility] [address]  HISTORY AND PHYSICAL      Gali Delgado  MR#: [id number]  : 1931  ACCOUNT #: [de-identified]   ADMIT DATE: 2017    PRIMARY CARDIOLOGIST:  Carly Fink MD    ADMITTING CARDIOLOGIST:  Freida Jones DO    REFERRING PHYSICIAN:  [address] [place] [address] ER physician. REASON FOR ADMISSION:  Acute hypoxic respiratory failure, secondary to acute diastolic heart failure. HISTORY OF PRESENT ILLNESS:  The patient is an 59-year-old white female with history of CABG in , last heart catheterization in 2017 showing 3/3 patent grafts, chronic diastolic heart failure, with echocardiogram in 2017 showing a normal ejection fraction, with moderate mitral valve regurgitation, paroxysmal atrial fibrillation/flutter, who presents with a 1-week history of worsening exertional shortness of breath, orthopnea and fatigue. She, is here today with her family, who states that she has become increasingly more short of breath over the last week, it acutely worsened this morning, where she had severe shortness of breath and orthopnea. She subsequently presented to the emergency room, and had a room air saturation of 82%. She denies any chest pain or chest pressure. She does admit to worsening exertional fatigue. She apparently \"loves salt,\" and puts it on her food all day every day. It appears that she has never been admitted with a congestive heart failure exacerbation. She has chronic right lower extremity swelling, which she attributes to lymphedema, and she will wear a stocking for this. She is also followed by Pain Management for chronic cervical neck pain. She sees Pain Management every month. PAST MEDICAL HISTORY:  1. Chronic diastolic heart failure. 2.  Coronary artery disease, with prior CABG. 3.  Paroxysmal atrial fibrillation. 4.  Paroxysmal atrial flutter. 5.  Hypertension. 6.  Hypercholesterolemia. 7.  Mitral valve regurgitation. 8.  Prior CVA.   9. Chronic constipation. 10.  Chronic anemia. PAST SURGICAL HISTORY:  1. CABG in 2011.  2.  Thoracotomy and thoracentesis after the CABG in 2011. 3.  Multiple neck surgeries. 4.  Prior back surgery. ALLERGIES:  VERSED. HOME MEDICATIONS:  1. Amiodarone. 2.  Toprol. 3.  Protonix. 4.  Celebrex. 5.  Crestor. 6.  Plavix. 7.  Synthroid. 8.  Cymbalta. 9.  Lopid. 10.  Aspirin. 11.  Iron. 12.  Requip. SOCIAL HISTORY:  The patient does not smoke or drink alcohol. She lives by herself. FAMILY HISTORY:  Noncontributory at this time. REVIEW OF SYSTEMS:  CONSTITUTIONAL:  Positive for weight gain with recent swelling. The patient denies any fever or chills. HEAD/NECK:  No headaches, chronic pain in her neck. EYES:  No vision changes. EARS:  No hearing changes. CARDIOVASCULAR:  No recent syncope, palpitations or dizziness. RESPIRATORY:  Positive for a nonproductive cough. The patient denies any hemoptysis. GASTROINTESTINAL:  No recent melena or other bleeding. GENITOURINARY:  No history of renal failure or renal stones. SKIN:  No rash or nonhealing wounds. PSYCHIATRIC:  No active anxiety or depression. HEMATOLOGIC:  No history of bleeding or clotting disorder. PHYSICAL EXAMINATION:  VITAL SIGNS:  Temperature afebrile, pulse 60, respirations 20, blood pressure 180/80, 94% on 4 L. GENERAL:  This is a pleasant white female, elderly appearing, lying in bed in no active distress. She is alert and oriented x3. HEENT:  Normocephalic, atraumatic, moist mucous membranes, tongue is midline. NECK:  Positive JVD to the jaw. CARDIOVASCULAR:  Regular rate and rhythm with a 2/6 systolic murmur. PULMONARY:  Bibasilar rales. ABDOMEN:  Soft, nontender. GENITOURINARY:  No costovertebral tenderness. EXTREMITIES:  Mild to moderate swelling in the right leg, with no obvious swelling in the left leg. PSYCHIATRIC:  Appropriate mood and affect.   NEUROLOGICAL:  No obvious sensory or motor changes. SKIN:  No rashes. PERTINENT LABORATORY DATA AND STUDIES:   LABORATORY DATA:  Troponin 0.01. Hemoglobin 10.9, white count 13.1, platelets 298. Creatinine 1.3, with a GFR of 44, potassium 5.1. . TSH pending. Portable chest x-ray shows cardiomegaly with pulmonary vascular congestion. A 12-lead EKG:  Sinus rhythm, intraventricular conduction delay, nonspecific ST and T-wave changes. ASSESSMENT:  1. Acute hypoxic respiratory failure. 2.  Acute on chronic diastolic heart failure. 3.  Paroxysmal atrial fibrillation. 4.  Acute renal failure. 5.  Anemia. 6.  Coronary artery disease. 7.  Chronic right lower extremity swelling. PLAN:  1. Acute hypoxic respiratory failure. The patient presented with significant hypoxia and shortness of breath, which is likely secondary to acute on chronic diastolic heart failure. She will be started on IV Lasix, and supported with oxygen. I feel that a PE is unlikely, though we will check lower extremity Dopplers given the asymmetric swelling in her legs. Follow closely on the floor. Monitor her oxygen levels. 2.  Acute on chronic diastolic heart failure. The patient has worsening diastolic heart failure. We will monitor her blood pressure and give IV Lasix. Follow her labs closely. Check an echocardiogram.  She will need to be counseled on less salt intake, and lifestyle changes for heart failure. 3.  Coronary artery disease. The patient does have history of coronary artery disease. Her initial troponin is negative. We will continue home medicines and follow closely. 4.  Paroxysmal atrial fibrillation. The patient has history of paroxysmal atrial fibrillation, as well as paroxysmal atrial flutter. She is not on anticoagulation by Dr. Rossie Brunner. She does have history of a prior stroke. We will continue aspirin and Plavix and follow closely. 5.  Anemia. The patient appears to have chronic anemia, which is stable.   We will follow closely. 6.  Acute kidney injury. The patient has acute renal failure, likely secondary to prerenal azotemia from diastolic heart failure. We will follow her labs closely with diuresis. 7.  Right lower extremity edema. The patient has right lower extremity edema, which is asymmetric. We will check a lower extremity Doppler just to rule out a DVT. This appears to be chronic for her, however. The patient is doing better, will be admitted to a telemetry floor bed and followed closely.       DO JORGE Jaime/TAMIKO  D: 12/25/2017 12:03     T: 12/25/2017 13:27  JOB #: [id number]

## 2017-12-25 NOTE — ED TRIAGE NOTES
Pt arrived via EMS from home. EMS found pt SOB, crackles and 80% room air. EMS did duo-neb and nitro paste. BP initally was 226/110. After nitro was 190/96. . Pt did a nitro at 7:30AM Hx: cardiac history. No known hx of DM. Pt states SOB with chest pain. Pt does not normally wear oxygen.  PRN Lasix

## 2017-12-26 LAB
ALBUMIN SERPL-MCNC: 3.4 G/DL (ref 3.2–4.6)
ALBUMIN/GLOB SERPL: 0.9 {RATIO} (ref 1.2–3.5)
ALP SERPL-CCNC: 93 U/L (ref 50–136)
ALT SERPL-CCNC: 15 U/L (ref 12–65)
ANION GAP SERPL CALC-SCNC: 9 MMOL/L (ref 7–16)
AST SERPL-CCNC: 22 U/L (ref 15–37)
BILIRUB DIRECT SERPL-MCNC: 0.1 MG/DL
BILIRUB SERPL-MCNC: 0.4 MG/DL (ref 0.2–1.1)
BNP SERPL-MCNC: 271 PG/ML
BUN SERPL-MCNC: 40 MG/DL (ref 8–23)
CALCIUM SERPL-MCNC: 9.2 MG/DL (ref 8.3–10.4)
CHLORIDE SERPL-SCNC: 101 MMOL/L (ref 98–107)
CHOLEST SERPL-MCNC: 176 MG/DL
CO2 SERPL-SCNC: 27 MMOL/L (ref 21–32)
CREAT SERPL-MCNC: 1.44 MG/DL (ref 0.6–1)
ERYTHROCYTE [DISTWIDTH] IN BLOOD BY AUTOMATED COUNT: 14.2 % (ref 11.9–14.6)
GLOBULIN SER CALC-MCNC: 3.9 G/DL (ref 2.3–3.5)
GLUCOSE SERPL-MCNC: 91 MG/DL (ref 65–100)
HCT VFR BLD AUTO: 33.3 % (ref 35.8–46.3)
HDLC SERPL-MCNC: 93 MG/DL (ref 40–60)
HDLC SERPL: 1.9 {RATIO}
HGB BLD-MCNC: 10.6 G/DL (ref 11.7–15.4)
LDLC SERPL CALC-MCNC: 72.2 MG/DL
LIPID PROFILE,FLP: ABNORMAL
MAGNESIUM SERPL-MCNC: 2.3 MG/DL (ref 1.8–2.4)
MCH RBC QN AUTO: 31.5 PG (ref 26.1–32.9)
MCHC RBC AUTO-ENTMCNC: 31.8 G/DL (ref 31.4–35)
MCV RBC AUTO: 99.1 FL (ref 79.6–97.8)
PLATELET # BLD AUTO: 317 K/UL (ref 150–450)
PMV BLD AUTO: 10.5 FL (ref 10.8–14.1)
POTASSIUM SERPL-SCNC: 4 MMOL/L (ref 3.5–5.1)
PROT SERPL-MCNC: 7.3 G/DL (ref 6.3–8.2)
RBC # BLD AUTO: 3.36 M/UL (ref 4.05–5.25)
SODIUM SERPL-SCNC: 137 MMOL/L (ref 136–145)
TRIGL SERPL-MCNC: 54 MG/DL (ref 35–150)
TROPONIN I SERPL-MCNC: <0.02 NG/ML (ref 0.02–0.05)
TSH SERPL DL<=0.005 MIU/L-ACNC: 4.17 UIU/ML (ref 0.36–3.74)
VLDLC SERPL CALC-MCNC: 10.8 MG/DL (ref 6–23)
WBC # BLD AUTO: 7.6 K/UL (ref 4.3–11.1)

## 2017-12-26 PROCEDURE — 80048 BASIC METABOLIC PNL TOTAL CA: CPT | Performed by: INTERNAL MEDICINE

## 2017-12-26 PROCEDURE — 74011000250 HC RX REV CODE- 250: Performed by: INTERNAL MEDICINE

## 2017-12-26 PROCEDURE — 97161 PT EVAL LOW COMPLEX 20 MIN: CPT

## 2017-12-26 PROCEDURE — 80061 LIPID PANEL: CPT | Performed by: INTERNAL MEDICINE

## 2017-12-26 PROCEDURE — 84484 ASSAY OF TROPONIN QUANT: CPT | Performed by: INTERNAL MEDICINE

## 2017-12-26 PROCEDURE — 83735 ASSAY OF MAGNESIUM: CPT | Performed by: INTERNAL MEDICINE

## 2017-12-26 PROCEDURE — 74011250636 HC RX REV CODE- 250/636: Performed by: INTERNAL MEDICINE

## 2017-12-26 PROCEDURE — 85027 COMPLETE CBC AUTOMATED: CPT | Performed by: INTERNAL MEDICINE

## 2017-12-26 PROCEDURE — C8929 TTE W OR WO FOL WCON,DOPPLER: HCPCS

## 2017-12-26 PROCEDURE — 84443 ASSAY THYROID STIM HORMONE: CPT | Performed by: INTERNAL MEDICINE

## 2017-12-26 PROCEDURE — 36415 COLL VENOUS BLD VENIPUNCTURE: CPT | Performed by: INTERNAL MEDICINE

## 2017-12-26 PROCEDURE — 74011250637 HC RX REV CODE- 250/637: Performed by: INTERNAL MEDICINE

## 2017-12-26 PROCEDURE — 97530 THERAPEUTIC ACTIVITIES: CPT

## 2017-12-26 PROCEDURE — 93005 ELECTROCARDIOGRAM TRACING: CPT | Performed by: INTERNAL MEDICINE

## 2017-12-26 PROCEDURE — 83880 ASSAY OF NATRIURETIC PEPTIDE: CPT | Performed by: INTERNAL MEDICINE

## 2017-12-26 PROCEDURE — 80076 HEPATIC FUNCTION PANEL: CPT | Performed by: INTERNAL MEDICINE

## 2017-12-26 PROCEDURE — 65660000000 HC RM CCU STEPDOWN

## 2017-12-26 RX ORDER — FUROSEMIDE 40 MG/1
40 TABLET ORAL DAILY
Status: DISCONTINUED | OUTPATIENT
Start: 2017-12-27 | End: 2017-12-27 | Stop reason: HOSPADM

## 2017-12-26 RX ADMIN — HEPARIN SODIUM 5000 UNITS: 5000 INJECTION, SOLUTION INTRAVENOUS; SUBCUTANEOUS at 05:23

## 2017-12-26 RX ADMIN — ROPINIROLE HYDROCHLORIDE 1 MG: 1 TABLET, FILM COATED ORAL at 18:16

## 2017-12-26 RX ADMIN — ROPINIROLE HYDROCHLORIDE 1 MG: 1 TABLET, FILM COATED ORAL at 12:02

## 2017-12-26 RX ADMIN — Medication 1 AMPULE: at 21:29

## 2017-12-26 RX ADMIN — Medication 1 AMPULE: at 05:23

## 2017-12-26 RX ADMIN — AMIODARONE HYDROCHLORIDE 200 MG: 200 TABLET ORAL at 09:13

## 2017-12-26 RX ADMIN — GEMFIBROZIL 600 MG: 600 TABLET ORAL at 09:20

## 2017-12-26 RX ADMIN — ROPINIROLE HYDROCHLORIDE 1 MG: 1 TABLET, FILM COATED ORAL at 21:29

## 2017-12-26 RX ADMIN — CLOPIDOGREL BISULFATE 75 MG: 75 TABLET ORAL at 09:13

## 2017-12-26 RX ADMIN — HEPARIN SODIUM 5000 UNITS: 5000 INJECTION, SOLUTION INTRAVENOUS; SUBCUTANEOUS at 21:29

## 2017-12-26 RX ADMIN — PANTOPRAZOLE SODIUM 40 MG: 40 TABLET, DELAYED RELEASE ORAL at 09:20

## 2017-12-26 RX ADMIN — HEPARIN SODIUM 5000 UNITS: 5000 INJECTION, SOLUTION INTRAVENOUS; SUBCUTANEOUS at 14:02

## 2017-12-26 RX ADMIN — ASPIRIN 81 MG 81 MG: 81 TABLET ORAL at 09:21

## 2017-12-26 RX ADMIN — METOPROLOL SUCCINATE 25 MG: 25 TABLET, EXTENDED RELEASE ORAL at 09:20

## 2017-12-26 RX ADMIN — HYDROCODONE BITARTRATE AND ACETAMINOPHEN 1 TABLET: 5; 325 TABLET ORAL at 14:01

## 2017-12-26 RX ADMIN — GEMFIBROZIL 600 MG: 600 TABLET ORAL at 18:16

## 2017-12-26 RX ADMIN — Medication 1 AMPULE: at 14:11

## 2017-12-26 RX ADMIN — FUROSEMIDE 40 MG: 10 INJECTION, SOLUTION INTRAMUSCULAR; INTRAVENOUS at 09:21

## 2017-12-26 RX ADMIN — LEVOTHYROXINE SODIUM 137 MCG: 112 TABLET ORAL at 09:18

## 2017-12-26 RX ADMIN — MINERAL SUPPLEMENT IRON 300 MG / 5 ML STRENGTH LIQUID 100 PER BOX UNFLAVORED 300 MG: at 18:16

## 2017-12-26 RX ADMIN — MINERAL SUPPLEMENT IRON 300 MG / 5 ML STRENGTH LIQUID 100 PER BOX UNFLAVORED 300 MG: at 09:21

## 2017-12-26 RX ADMIN — ROSUVASTATIN CALCIUM 40 MG: 20 TABLET, FILM COATED ORAL at 21:29

## 2017-12-26 RX ADMIN — PERFLUTREN 1 ML: 6.52 INJECTION, SUSPENSION INTRAVENOUS at 10:00

## 2017-12-26 RX ADMIN — DULOXETINE HYDROCHLORIDE 20 MG: 20 CAPSULE, DELAYED RELEASE ORAL at 09:20

## 2017-12-26 NOTE — PROGRESS NOTES
Verbal bedside report given to santi Hoffmann RN. Patient's situation, background, assessment and recommendations provided. Opportunity for questions provided. Oncoming RN assumed care of patient.

## 2017-12-26 NOTE — PROGRESS NOTES
Bedside shift change report given to self (oncoming nurse) by Israel Corrales RN (offgoing nurse). Report included the following information SBAR.

## 2017-12-26 NOTE — PROGRESS NOTES
Problem: Falls - Risk of  Goal: *Absence of Falls  Document Leann Fall Risk and appropriate interventions in the flowsheet.    Outcome: Progressing Towards Goal  Fall Risk Interventions:  Mobility Interventions: Communicate number of staff needed for ambulation/transfer, Patient to call before getting OOB         Medication Interventions: Patient to call before getting OOB, Teach patient to arise slowly    Elimination Interventions: Call light in reach, Patient to call for help with toileting needs, Toileting schedule/hourly rounds

## 2017-12-26 NOTE — PROGRESS NOTES
Problem: Mobility Impaired (Adult and Pediatric)  Goal: *Acute Goals and Plan of Care (Insert Text)  LTG:  (1.)Ms. Cassi Cardenas will move from supine to sit and sit to supine  in bed with INDEPENDENT within 5 day(s). (2.)Ms. Cassi Cardenas will transfer from bed to chair and chair to bed with MODIFIED INDEPENDENCE using the least restrictive device within 5 day(s). (3.)Ms. Cassi Cardenas will ambulate with MODIFIED INDEPENDENCE for 500 feet with the least restrictive device within 5 day(s). ________________________________________________________________________________________________      PHYSICAL THERAPY: Initial Assessment, Treatment Day: Day of Assessment, PM 12/26/2017  INPATIENT: Hospital Day: 2  [place] / Product Type: Medicare /      NAME/AGE/GENDER: Rashida Iqbal is a 80 y.o. female   PRIMARY DIAGNOSIS: Acute heart failure ([address] Acute respiratory failure with hypoxia ([address] Acute respiratory failure with hypoxia ([address]        ICD-10: Treatment Diagnosis:   · Difficulty in walking, Not elsewhere classified (R26.2)   Precaution/Allergies:  Versed [midazolam]      ASSESSMENT:     Ms. Cassi Cardenas presents supine in bed and is agreeable to participate in PT. She lives at an [facility] and is Mod I with a rollator and/or RW at baseline. She currently requires SBA for transfers and CGA/SBA for ambulation with a RW. She demonstrates functional mobility deficits below baseline and will benefit from skilled IP PT to address these deficits and maximize function during her hospital stay. She will also benefit from skilled PT services at discharge. This section established at most recent assessment   PROBLEM LIST (Impairments causing functional limitations):  1. Decreased Strength  2. Decreased Transfer Abilities  3. Decreased Ambulation Ability/Technique  4. Decreased Balance  5.  Decreased Activity Tolerance   INTERVENTIONS PLANNED: (Benefits and precautions of physical therapy have been discussed with the patient.)  1. Bed Mobility  2. Gait Training  3. Therapeutic Activites  4. Therapeutic Exercise/Strengthening  5. Transfer Training     TREATMENT PLAN: Frequency/Duration: 3 times a week for duration of hospital stay  Rehabilitation Potential For Stated Goals: Good     RECOMMENDED REHABILITATION/EQUIPMENT: (at time of discharge pending progress): Due to the probability of continued deficits (see above) this patient will likely need continued skilled physical therapy after discharge. Equipment:    None at this time              HISTORY:   History of Present Injury/Illness (Reason for Referral):  Per MD: The patient is an 61-year-old white female with history of CABG in 2011, last heart catheterization in 07/2017 showing 3/3 patent grafts, chronic diastolic heart failure, with echocardiogram in 07/2017 showing a normal ejection fraction, with moderate mitral valve regurgitation, paroxysmal atrial fibrillation/flutter, who presents with a 1-week history of worsening exertional shortness of breath, orthopnea and fatigue. She, is here today with her family, who states that she has become increasingly more short of breath over the last week, it acutely worsened this morning, where she had severe shortness of breath and orthopnea. She subsequently presented to the emergency room, and had a room air saturation of 82%. She denies any chest pain or chest pressure. She does admit to worsening exertional fatigue. She apparently \"loves salt,\" and puts it on her food all day every day. It appears that she has never been admitted with a congestive heart failure exacerbation. She has chronic right lower extremity swelling, which she attributes to lymphedema, and she will wear a stocking for this. She is also followed by Pain Management for chronic cervical neck pain. She sees Pain Management every month.   Past Medical History/Comorbidities:   Ms. Lonnell Schwab  has a past medical history of Anemia associated with acute blood loss (7/24/2011); Atrial fibrillation ([address] (7/13/2011); CAD (coronary artery disease) (1997); Chronic diastolic heart failure ([address] (10/29/2015); Chronic pain; Constipation- no BM since 7/30 (8/5/2011); Coronary atherosclerosis of native coronary artery--PTCA 1997 LAD/diag (7/1/2011); CVA (cerebrovascular accident) ([address] (3/29/2009); Diastolic CHF, acute on chronic (HCC) (12/25/2017); GI bleed (1/11/2015); HEMOTHORAX ON LEFT  (7/24/2011); Hyperlipemia (3/29/2009); Hypertension (3/29/2009); Hypertension, benign (10/29/2015); Hyponatremia (7/24/2011); Hypothyroidism (7/7/2011); Leucocytosis (8/3/2011); Leukocytosis (1/11/2015); Irene-Starr tear (1/12/2015); Mitral valve insufficiency (4/2/2009); Paroxysmal atrial fibrillation ([address] (10/29/2015); Pleural effusion, bilateral (7/23/2011); S/p thoracotomy- evauation of hemothorax (8/4/2011); and Unspecified hypothyroidism (3/30/2009). She also has no past medical history of Arthritis; Asthma; Autoimmune disease ([address] Cancer ([address] Chronic kidney disease; COPD; DEMENTIA; Diabetes ([address] GERD (gastroesophageal reflux disease); Infectious disease; Liver disease; Psychiatric disorder; PUD (peptic ulcer disease); Seizures ([address] Sleep disorder; or Thromboembolus ([address] Ms. Betsy Arthur  has a past surgical history that includes hx orthopaedic (4 years ago); hx orthopaedic (10 years); hx back surgery; hx other surgical; and pr cabg, artery-vein, three (7/7/2011).   Social History/Living Environment:   Home Environment: [facility] [address] Name: [place]  # Steps to Enter: 0  One/Two Story Residence: One story  Living Alone: No  Support Systems: Assisted living, Family member(s)  Patient Expects to be Discharged to[de-identified] Assisted living  Current DME Used/Available at Home: eulalio Goldsmith Walker, rollator  Prior Level of Function/Work/Activity:  Mod I with rollator and/or RW   Number of Personal Factors/Comorbidities that affect the Plan of Care: 1-2: MODERATE COMPLEXITY   EXAMINATION:   Most Recent Physical Functioning:   Gross Assessment:  Strength: Within functional limits (BLEs)               Posture:     Balance:  Sitting: Intact  Standing: Impaired  Standing - Static: Good  Standing - Dynamic : Fair Bed Mobility:  Supine to Sit: Supervision  Sit to Supine: Supervision  Wheelchair Mobility:     Transfers:  Sit to Stand: Stand-by asssistance  Stand to Sit: Stand-by asssistance  Bed to Chair: Stand-by asssistance (without AD)  Gait:     Speed/Jonelle: Slow  Gait Abnormalities: Path deviations;Decreased step clearance  Distance (ft): 200 Feet (ft)  Assistive Device: Walker, rolling  Ambulation - Level of Assistance: Contact guard assistance;Stand-by asssistance      Body Structures Involved:  1. Heart  2. Muscles Body Functions Affected:  1. Cardio  2. Respiratory  3. Movement Related Activities and Participation Affected:  1. Mobility  2. Self Care   Number of elements that affect the Plan of Care: 4+: HIGH COMPLEXITY   CLINICAL PRESENTATION:   Presentation: Stable and uncomplicated: LOW COMPLEXITY   CLINICAL DECISION MAKIN [facility] [address] [id number] AM-PAC 6 Clicks   Basic Mobility Inpatient Short Form  How much difficulty does the patient currently have. .. Unable A Lot A Little None   1. Turning over in bed (including adjusting bedclothes, sheets and blankets)? [] 1   [] 2   [] 3   [x] 4   2. Sitting down on and standing up from a chair with arms ( e.g., wheelchair, bedside commode, etc.)   [] 1   [] 2   [x] 3   [] 4   3. Moving from lying on back to sitting on the side of the bed? [] 1   [] 2   [] 3   [x] 4   How much help from another person does the patient currently need. .. Total A Lot A Little None   4. Moving to and from a bed to a chair (including a wheelchair)? [] 1   [] 2   [x] 3   [] 4   5. Need to walk in hospital room? [] 1   [] 2   [x] 3   [] 4   6. Climbing 3-5 steps with a railing?    [] 1   [] 2   [x] 3   [] 4   © , Trustees of [facility] [address] [address], under license to [address]. All rights reserved      Score:  Initial: 20 Most Recent: X (Date: -- )    Interpretation of Tool:  Represents activities that are increasingly more difficult (i.e. Bed mobility, Transfers, Gait). Score 24 23 22-20 19-15 14-10 9-7 6     Modifier CH CI CJ CK CL CM CN      ? Mobility - Walking and Moving Around:     - CURRENT STATUS: CJ - 20%-39% impaired, limited or restricted    - GOAL STATUS: CI - 1%-19% impaired, limited or restricted    - D/C STATUS:  ---------------To be determined---------------  [place] / [place] / Product Type: Medicare /      Medical Necessity:     · Skilled intervention continues to be required due to impaired functional mobility. Reason for Services/Other Comments:  · Patient continues to require skilled intervention due to decreased strength, balance, activity tolerance. Use of outcome tool(s) and clinical judgement create a POC that gives a: Clear prediction of patient's progress: LOW COMPLEXITY            TREATMENT:   (In addition to Assessment/Re-Assessment sessions the following treatments were rendered)   Pre-treatment Symptoms/Complaints:  C/o parasthesias in RLE  Pain: Initial:   Pain Intensity 1: 0  Post Session:  0     Therapeutic Activity: (    8):  Therapeutic activities including Bed transfers, Toilet transfers, Ambulation on level ground and static and dynamic standing balance to improve mobility, strength and balance. Braces/Orthotics/Lines/Etc:   · None  Treatment/Session Assessment:    · Response to Treatment:  Mild fatigue, resting comfortably in bed per pt request  · Interdisciplinary Collaboration:   o Physical Therapist  o Registered Nurse  o Certified Nursing Assistant/Patient Care Technician  · After treatment position/precautions:   o Supine in bed  o Call light within reach  o Family at bedside   · Compliance with Program/Exercises:  Will assess as treatment progresses. · Recommendations/Intent for next treatment session: \"Next visit will focus on advancements to more challenging activities and reduction in assistance provided\".   Total Treatment Duration:  PT Patient Time In/Time Out  Time In: 1403  Time Out: 1191 Nicolasa Kelly, PT

## 2017-12-26 NOTE — PROGRESS NOTES
Care Management Interventions  PCP Verified by CM: Yes (Sept 2017)  Transition of Care Consult (CM Consult): Discharge Planning  Current Support Network: Lives Alone  Confirm Follow Up Transport: Family  Plan discussed with Pt/Family/Caregiver: Yes  Freedom of Choice Offered: Yes  Discharge Location  Discharge Placement: Home  Met with patient for d/c planning. Patient alert and oriented x 3 independent of ADL's and lives alone at [facility]. Patient has her meals at the dining area at [employer]. She has a walker, wheelchair, and shower chair at home. She states she plans to return home at discharge. Was on O2 at sat was 95% on RA after ambulation with PT. Current d/c is home.

## 2017-12-26 NOTE — ROUTINE PROCESS
CHF teaching held; ECHO @ BS, Planner @ BS and will follow.     Start 2 l/D FR    Palliative Care: ACP on file

## 2017-12-26 NOTE — PROGRESS NOTES
Verbal bedside report received from Ji Abarca, [facility]. Assumed care of patient.

## 2017-12-26 NOTE — PROGRESS NOTES
12/26/2017 12:27 PM    Admit Date: 12/25/2017    Admit Diagnosis: Acute heart failure ([address]      Subjective:   No cp and breathing much better      Objective:      Visit Vitals    BP (!) 104/38    Pulse (!) 51    Temp 96.4 °F (35.8 °C)    Resp 20    Ht 5' (1.524 m)    Wt 55.2 kg (121 lb 9.6 oz)    SpO2 98%    Breastfeeding No    BMI 23.75 kg/m2       Physical Exam:  Nataila Show, Well Nourished, No Acute Distress, Alert & Oriented x 3, appropriate mood. Neck- supple, no JVD  CV- regular rate and rhythm no MRG  Lung- clear bilaterally  Abd- soft, nontender, nondistended  Ext- no edema bilaterally. Skin- warm and dry        Data Review:   Recent Labs      12/26/17   0754   NA  137   K  4.0   BUN  40*   CREA  1.44*   WBC  7.6   HGB  10.6*   HCT  33.3*   PLT  317   HDL  93*       Assessment/Plan:     Principal Problem:    Acute respiratory failure with hypoxia (HCC) (12/25/2017)Resolved. Continue current meds  Wean off O2 today and change to po lasix    Active Problems:    Mitral valve insufficiency (4/2/2009)      Atrial fibrillation ([address] (7/13/2011)      CAD (coronary artery disease) (8/4/2011)Stable. Continue current medical therapy. Overview: CABG 2011:  LIMA-LAD, SVG-OM, SVG-PDA      Localized edema (6/82/3208)      Diastolic CHF, acute on chronic (HCC) (12/25/2017)Improved with current therapy.  Will continue medications        Acute kidney failure ([address] (12/25/2017)cr mildly increased- repeat tomorrow      Acute heart failure ([address] (12/25/2017)

## 2017-12-27 VITALS
DIASTOLIC BLOOD PRESSURE: 45 MMHG | HEIGHT: 60 IN | BODY MASS INDEX: 22.83 KG/M2 | WEIGHT: 116.3 LBS | SYSTOLIC BLOOD PRESSURE: 101 MMHG | OXYGEN SATURATION: 98 % | TEMPERATURE: 97.9 F | RESPIRATION RATE: 20 BRPM | HEART RATE: 61 BPM

## 2017-12-27 LAB
ANION GAP SERPL CALC-SCNC: 11 MMOL/L (ref 7–16)
ATRIAL RATE: 57 BPM
BUN SERPL-MCNC: 55 MG/DL (ref 8–23)
CALCIUM SERPL-MCNC: 9.6 MG/DL (ref 8.3–10.4)
CALCULATED P AXIS, ECG09: 85 DEGREES
CALCULATED R AXIS, ECG10: 54 DEGREES
CALCULATED T AXIS, ECG11: 72 DEGREES
CHLORIDE SERPL-SCNC: 102 MMOL/L (ref 98–107)
CO2 SERPL-SCNC: 25 MMOL/L (ref 21–32)
CREAT SERPL-MCNC: 1.57 MG/DL (ref 0.6–1)
DIAGNOSIS, 93000: NORMAL
GLUCOSE SERPL-MCNC: 87 MG/DL (ref 65–100)
MAGNESIUM SERPL-MCNC: 2.5 MG/DL (ref 1.8–2.4)
P-R INTERVAL, ECG05: 296 MS
POTASSIUM SERPL-SCNC: 3.9 MMOL/L (ref 3.5–5.1)
Q-T INTERVAL, ECG07: 488 MS
QRS DURATION, ECG06: 86 MS
QTC CALCULATION (BEZET), ECG08: 474 MS
SODIUM SERPL-SCNC: 138 MMOL/L (ref 136–145)
VENTRICULAR RATE, ECG03: 57 BPM

## 2017-12-27 PROCEDURE — 74011250636 HC RX REV CODE- 250/636: Performed by: INTERNAL MEDICINE

## 2017-12-27 PROCEDURE — 74011250637 HC RX REV CODE- 250/637: Performed by: INTERNAL MEDICINE

## 2017-12-27 PROCEDURE — 80048 BASIC METABOLIC PNL TOTAL CA: CPT | Performed by: INTERNAL MEDICINE

## 2017-12-27 PROCEDURE — 36415 COLL VENOUS BLD VENIPUNCTURE: CPT | Performed by: INTERNAL MEDICINE

## 2017-12-27 PROCEDURE — 83735 ASSAY OF MAGNESIUM: CPT | Performed by: INTERNAL MEDICINE

## 2017-12-27 RX ORDER — FUROSEMIDE 40 MG/1
40 TABLET ORAL DAILY
Qty: 30 TAB | Refills: 3 | Status: ON HOLD | OUTPATIENT
Start: 2017-12-27 | End: 2018-02-08

## 2017-12-27 RX ADMIN — DULOXETINE HYDROCHLORIDE 20 MG: 20 CAPSULE, DELAYED RELEASE ORAL at 09:26

## 2017-12-27 RX ADMIN — MINERAL SUPPLEMENT IRON 300 MG / 5 ML STRENGTH LIQUID 100 PER BOX UNFLAVORED 300 MG: at 09:29

## 2017-12-27 RX ADMIN — ASPIRIN 81 MG 81 MG: 81 TABLET ORAL at 09:27

## 2017-12-27 RX ADMIN — PANTOPRAZOLE SODIUM 40 MG: 40 TABLET, DELAYED RELEASE ORAL at 09:23

## 2017-12-27 RX ADMIN — LEVOTHYROXINE SODIUM 137 MCG: 112 TABLET ORAL at 09:23

## 2017-12-27 RX ADMIN — AMIODARONE HYDROCHLORIDE 200 MG: 200 TABLET ORAL at 09:27

## 2017-12-27 RX ADMIN — HEPARIN SODIUM 5000 UNITS: 5000 INJECTION, SOLUTION INTRAVENOUS; SUBCUTANEOUS at 05:31

## 2017-12-27 RX ADMIN — METOPROLOL SUCCINATE 25 MG: 25 TABLET, EXTENDED RELEASE ORAL at 09:27

## 2017-12-27 RX ADMIN — GEMFIBROZIL 600 MG: 600 TABLET ORAL at 09:26

## 2017-12-27 RX ADMIN — Medication 1 AMPULE: at 05:31

## 2017-12-27 RX ADMIN — FUROSEMIDE 40 MG: 40 TABLET ORAL at 09:26

## 2017-12-27 RX ADMIN — CLOPIDOGREL BISULFATE 75 MG: 75 TABLET ORAL at 09:27

## 2017-12-27 NOTE — DISCHARGE INSTRUCTIONS
*  Please give a list of your current medications to your Primary Care Provider. *  Please update this list whenever your medications are discontinued, doses are      changed, or new medications (including over-the-counter products) are added. *  Please carry medication information at all times in case of emergency situations. These are general instructions for a healthy lifestyle:    No smoking/ No tobacco products/ Avoid exposure to second hand smoke  Surgeon General's Warning:  Quitting smoking now greatly reduces serious risk to your health. Obesity, smoking, and sedentary lifestyle greatly increases your risk for illness    A healthy diet, regular physical exercise & weight monitoring are important for maintaining a healthy lifestyle    You may be retaining fluid if you have a history of heart failure or if you experience any of the following symptoms:  Weight gain of 3 pounds or more overnight or 5 pounds in a week, increased swelling in our hands or feet or shortness of breath while lying flat in bed. Please call your doctor as soon as you notice any of these symptoms; do not wait until your next office visit. Recognize signs and symptoms of STROKE:    F-face looks uneven    A-arms unable to move or move unevenly    S-speech slurred or non-existent    T-time-call 911 as soon as signs and symptoms begin-DO NOT go       Back to bed or wait to see if you get better-TIME IS BRAIN. Warning Signs of HEART ATTACK     Call 911 if you have these symptoms:   Chest discomfort. Most heart attacks involve discomfort in the center of the chest that lasts more than a few minutes, or that goes away and comes back. It can feel like uncomfortable pressure, squeezing, fullness, or pain.  Discomfort in other areas of the upper body. Symptoms can include pain or discomfort in one or both arms, the back, neck, jaw, or stomach.  Shortness of breath with or without chest discomfort.    Other signs may include breaking out in a cold sweat, nausea, or lightheadedness. Don't wait more than five minutes to call 911 - MINUTES MATTER! Fast action can save your life. Calling 911 is almost always the fastest way to get lifesaving treatment. Emergency Medical Services staff can begin treatment when they arrive -- up to an hour sooner than if someone gets to the hospital by car. The discharge information has been reviewed with the patient. The patient verbalized understanding. Discharge medications reviewed with the patient and appropriate educational materials and side effects teaching were provided. ___________________________________________________________________________________________________________________________________  Heart Failure: Care Instructions  Your Care Instructions    Heart failure occurs when your heart does not pump as much blood as the body needs. Failure does not mean that the heart has stopped pumping but rather that it is not pumping as well as it should. Over time, this causes fluid buildup in your lungs and other parts of your body. Fluid buildup can cause shortness of breath, fatigue, swollen ankles, and other problems. By taking medicines regularly, reducing sodium (salt) in your diet, checking your weight every day, and making lifestyle changes, you can feel better and live longer. Follow-up care is a key part of your treatment and safety. Be sure to make and go to all appointments, and call your doctor if you are having problems. It's also a good idea to know your test results and keep a list of the medicines you take. How can you care for yourself at home? Medicines  ? · Be safe with medicines. Take your medicines exactly as prescribed. Call your doctor if you think you are having a problem with your medicine.    ? · Do not take any vitamins, over-the-counter medicine, or herbal products without talking to your doctor first. Rosa Amel not take ibuprofen (Advil or Motrin) and naproxen (Aleve) without talking to your doctor first. They could make your heart failure worse. ? · You may be taking some of the following medicine. ¨ Beta-blockers can slow heart rate, decrease blood pressure, and improve your condition. Taking a beta-blocker may lower your chance of needing to be hospitalized. ¨ Angiotensin-converting enzyme inhibitors (ACEIs) reduce the heart's workload, lower blood pressure, and reduce swelling. Taking an ACEI may lower your chance of needing to be hospitalized again. ¨ Angiotensin II receptor blockers (ARBs) work like ACEIs. Your doctor may prescribe them instead of ACEIs. ¨ Diuretics, also called water pills, reduce swelling. ¨ Potassium supplements replace this important mineral, which is sometimes lost with diuretics. ¨ Aspirin and other blood thinners prevent blood clots, which can cause a stroke or heart attack. ? You will get more details on the specific medicines your doctor prescribes. Diet  ? · Your doctor may suggest that you limit sodium to 2,000 milligrams (mg) a day or less. That is less than 1 teaspoon of salt a day, including all the salt you eat in cooking or in packaged foods. People get most of their sodium from processed foods. Fast food and restaurant meals also tend to be very high in sodium. ? · Ask your doctor how much liquid you can drink each day. You may have to limit liquids. ?Weight  ? · Weigh yourself without clothing at the same time each day. Record your weight. Call your doctor if you have a sudden weight gain, such as more than 2 to 3 pounds in a day or 5 pounds in a week. (Your doctor may suggest a different range of weight gain.) A sudden weight gain may mean that your heart failure is getting worse. ? Activity level  ? · Start light exercise (if your doctor says it is okay). Even if you can only do a small amount, exercise will help you get stronger, have more energy, and manage your weight and your stress.  Walking is an easy way to get exercise. Start out by walking a little more than you did before. Bit by bit, increase the amount you walk. ? · When you exercise, watch for signs that your heart is working too hard. You are pushing yourself too hard if you cannot talk while you are exercising. If you become short of breath or dizzy or have chest pain, stop, sit down, and rest.   ? · If you feel \"wiped out\" the day after you exercise, walk slower or for a shorter distance until you can work up to a better pace. ? · Get enough rest at night. Sleeping with 1 or 2 pillows under your upper body and head may help you breathe easier. ? Lifestyle changes  ? · Do not smoke. Smoking can make a heart condition worse. If you need help quitting, talk to your doctor about stop-smoking programs and medicines. These can increase your chances of quitting for good. Quitting smoking may be the most important step you can take to protect your heart. ? · Limit alcohol to 2 drinks a day for men and 1 drink a day for women. Too much alcohol can cause health problems. ? · Avoid getting sick from colds and the flu. Get a pneumococcal vaccine shot. If you have had one before, ask your doctor whether you need another dose. Get a flu shot each year. If you must be around people with colds or the flu, wash your hands often. When should you call for help? Call 911 if you have symptoms of sudden heart failure such as:  ? · You have severe trouble breathing. ? · You cough up pink, foamy mucus. ? · You have a new irregular or rapid heartbeat. ?Call your doctor now or seek immediate medical care if:  ? · You have new or increased shortness of breath. ? · You are dizzy or lightheaded, or you feel like you may faint. ? · You have sudden weight gain, such as more than 2 to 3 pounds in a day or 5 pounds in a week. (Your doctor may suggest a different range of weight gain.)   ? · You have increased swelling in your legs, ankles, or feet.    ? · You are suddenly so tired or weak that you cannot do your usual activities. ? Watch closely for changes in your health, and be sure to contact your doctor if you develop new symptoms. Where can you learn more? Go to http://bal-rosa isela.info/. Enter L317 in the search box to learn more about \"Heart Failure: Care Instructions. \"  Current as of: September 21, 2016  Content Version: 11.4  © 8106-4187 [employer]. Care instructions adapted under license by [employer] (which disclaims liability or warranty for this information). If you have questions about a medical condition or this instruction, always ask your healthcare professional. [address] any warranty or liability for your use of this information. [employer] Activation    Thank you for enrolling in [address] Please follow the instructions below to securely access your online medical record. [employer] allows you to send messages to your doctor, view your test results, renew your prescriptions, schedule appointments, and more. How Do I Sign Up? 1. In your internet browser, go to [email or web address]. 2. Click on the First Time User? Click Here link in the Sign In box. You will see the New Member Sign Up page. 3. Enter your [employer] Access Code exactly as it appears below. You will not need to use this code after youve completed the sign-up process. If you do not sign up before the expiration date, you must request a new code. [employer] Access Code: [email or web address]  Expires: 3/25/2018  8:59 AM     4. Enter the last four digits of your Social Security Number (xxxx) and Date of Birth (mm/dd/yyyy) as indicated and click Submit. You will be taken to the next sign-up page. 5. Create a [employer] ID. This will be your [employer] login ID and cannot be changed, so think of one that is secure and easy to remember. 6. Create a [employer] password. You can change your password at any time.   7. Enter your Password Reset Question and Answer. This can be used at a later time if you forget your password. 8. Enter your e-mail address. You will receive e-mail notification when new information is available in [address] 9. Click Sign Up. You can now view your medical record. Additional Information    Remember, [employer] is NOT to be used for urgent needs. For medical emergencies, dial 911. Now available from your iPhone and Android!

## 2017-12-27 NOTE — PROGRESS NOTES
Problem: Falls - Risk of  Goal: *Absence of Falls  Document Leann Fall Risk and appropriate interventions in the flowsheet.    Outcome: Progressing Towards Goal  Fall Risk Interventions:  Mobility Interventions: Bed/chair exit alarm, Communicate number of staff needed for ambulation/transfer, Patient to call before getting OOB, PT Consult for mobility concerns, Utilize walker, cane, or other assitive device    Mentation Interventions: Adequate sleep, hydration, pain control, Bed/chair exit alarm, Increase mobility, More frequent rounding, Door open when patient unattended    Medication Interventions: Bed/chair exit alarm, Patient to call before getting OOB, Teach patient to arise slowly    Elimination Interventions: Bed/chair exit alarm, Call light in reach, Patient to call for help with toileting needs, Toileting schedule/hourly rounds

## 2017-12-27 NOTE — DISCHARGE SUMMARY
[address] Cardiology Discharge Summary     Patient ID:  Helene Courser  [id number]  40 y.o.  5/1/1931    Admit date: 12/25/2017    Discharge date and time:  12-    Admitting Physician: Mat Nayak DO     Discharge Physician: Jerardo Cooley PA-C/Dr. Susan Vargas    Admission Diagnoses: Acute on chronic diastolic heart failure [facility])    Discharge Diagnoses:   Patient Active Problem List    Diagnosis Date Noted    Acute respiratory failure with hypoxia ([address] 05/92/7078    Diastolic CHF, acute on chronic ([address] 12/25/2017    Acute kidney failure ([address] 12/25/2017    Acute heart failure ([address] 12/25/2017    Localized edema 08/29/2017    Unstable angina ([address] 07/07/2017    S/P CABG (coronary artery bypass graft) 09/14/2016    Paroxysmal atrial fibrillation ([address] 10/29/2015    Irene-Starr tear 01/12/2015    GI bleed 01/11/2015    Leukocytosis 01/11/2015    Constipation- no BM since 7/30 08/05/2011    CAD (coronary artery disease) 08/04/2011    S/p thoracotomy- evauation of hemothorax 08/04/2011    Leucocytosis 08/03/2011    Anemia associated with acute blood loss 07/24/2011    Hyponatremia 07/24/2011    HEMOTHORAX ON LEFT  07/24/2011    Pleural effusion, bilateral 07/23/2011    Atrial fibrillation ([address] 07/13/2011    Hypothyroidism 07/07/2011    Coronary atherosclerosis of native coronary artery--PTCA 1997 LAD/diag 07/01/2011    Mitral valve insufficiency 04/02/2009    Unspecified hypothyroidism 03/30/2009    Hypertension 03/29/2009    Hyperlipemia 03/29/2009       Cardiology Procedures this admission:  EchoCardiogram  Consults: None    Hospital Course: Pt is an 59-year-old WF with h/o CABG in 2011, last heart catheterization in 07/2017 showing 3/3 patent grafts, chronic diastolic heart failure with echocardiogram in 07/2017 showing a normal ejection fraction, with moderate mitral valve regurgitation, paroxysmal atrial fibrillation/flutter, who presents with a 1-week history of worsening exertional shortness of breath, orthopnea and fatigue. She has become increasingly more short of breath over the last week, it acutely worsened the morning of admission. She subsequently presented to the emergency room and had a room air saturation of 82%. She denied any chest pain or chest pressure. She \"loves salt,\" and puts it on her food all day every day. She has never been admitted with a congestive heart failure exacerbation. She has chronic right lower extremity swelling, which she attributes to lymphedema, and she will wear a stocking for this. BLE US was ordered and it showed no evidence for DVT. She is also followed by Pain Management for chronic cervical neck pain. She sees Pain Management every month. She was admitted with A/C diastolic CHF with elevated BNP of 647. She was started on IV diuresis with Lasix BID. Pt had an echocardiogram showing EF 60-65%, no WMA, NERY, mild AR, mild-mod MR and mild TR and IL. She had good response to IV diuresis and was able to be changed to PO Lasix on 12-26. She had increasing BUN/Cr and lasix was held and then resumed on 12/27 by Dr. Rochelle Monsivais. She was up feeling well without any complaints of CP, SOB, palpitations or LE swelling. She was seen and examined by Dr. Rochelle Monsivais and determined stable and ready for discharge. Pt was instructed on the importance of weighing self daily. If she gains more than 2 lbs overnight or 5 lbs in one week, Pt is instructed to call [address] Cardiology at [phone]. Pt was given lab slip for a BMP/Mg in 5 days and she will have early follow up in our office for TC 7 appt as below. Dr. Rochelle Monsivais started her on Lasix 40 mg Q AM w/o K+ supplement due to renal insuff. DISPOSITION: The patient is being discharged home in stable condition on a low saturated fat, low cholesterol and low salt diet. Pt is instructed to follow a fluid restricted diet with no more than 2 liters per day.  Pt is instructed to advance activities as tolerated limited to fatigue or shortness of breath. Pt is instructed to call office or return to ER for immediate evaluation of any shortness of breath or chest pain. Follow up with [facility] Dr. Umu Holland on Thursday 1/4 at 10:15 AM [place] office  Follow up with PCP Dr. Marcell Fuentes in 1-2 weeks    Discharge Exam:   Visit Vitals    /45    Pulse 61    Temp 97.9 °F (36.6 °C)    Resp 18    Ht 5' (1.524 m)    Wt 52.8 kg (116 lb 4.8 oz)    SpO2 92%    Breastfeeding No    BMI 22.71 kg/m2    Pt has been seen by Dr. Erich Morelos: see his progress note for exam details. Recent Results (from the past 24 hour(s))   EKG, 12 LEAD, INITIAL    Collection Time: 12/26/17 11:18 PM   Result Value Ref Range    Ventricular Rate 57 BPM    Atrial Rate 57 BPM    P-R Interval 296 ms    QRS Duration 86 ms    Q-T Interval 488 ms    QTC Calculation (Bezet) 474 ms    Calculated P Axis 85 degrees    Calculated R Axis 54 degrees    Calculated T Axis 72 degrees    Diagnosis       Sinus bradycardia with 1st degree A-V block  Septal infarct , age undetermined  Abnormal ECG    Confirmed by Harrietta Paget MD (), ALDO LAZAR ([id number]) on 12/27/2017 7:71:89 AM     METABOLIC PANEL, BASIC    Collection Time: 12/27/17  7:05 AM   Result Value Ref Range    Sodium 138 136 - 145 mmol/L    Potassium 3.9 3.5 - 5.1 mmol/L    Chloride 102 98 - 107 mmol/L    CO2 25 21 - 32 mmol/L    Anion gap 11 7 - 16 mmol/L    Glucose 87 65 - 100 mg/dL    BUN 55 (H) 8 - 23 MG/DL    Creatinine 1.57 (H) 0.6 - 1.0 MG/DL    GFR est AA 40 (L) >60 ml/min/1.73m2    GFR est non-AA 33 (L) >60 ml/min/1.73m2    Calcium 9.6 8.3 - 10.4 MG/DL   MAGNESIUM    Collection Time: 12/27/17  7:05 AM   Result Value Ref Range    Magnesium 2.5 (H) 1.8 - 2.4 mg/dL         Patient Instructions:   Current Discharge Medication List      START taking these medications    Details   furosemide (LASIX) 40 mg tablet Take 1 Tab by mouth daily.   Qty: 30 Tab, Refills: 3         CONTINUE these medications which have NOT CHANGED    Details   levothyroxine (SYNTHROID) 137 mcg tablet Take  by mouth Daily (before breakfast). amiodarone (CORDARONE) 200 mg tablet Take 1 Tab by mouth daily. Qty: 90 Tab, Refills: 3      metoprolol succinate (TOPROL-XL) 25 mg XL tablet Take  by mouth daily. pantoprazole (PROTONIX) 40 mg tablet Take 1 Tab by mouth Daily (before breakfast). Qty: 30 Tab, Refills: 6      HYDROcodone-acetaminophen (NORCO) 5-325 mg per tablet Take 1 Tab by mouth two (2) times a day. celecoxib (CELEBREX) 100 mg capsule Take  by mouth two (2) times a day. rosuvastatin (CRESTOR) 40 mg tablet Take 40 mg by mouth nightly. clopidogrel (PLAVIX) 75 mg tablet Take 1 Tab by mouth daily. Qty: 90 Tab, Refills: 3      DULoxetine (CYMBALTA) 20 mg capsule Take 20 mg by mouth daily. Calcium Carbonate-Vit D3-Min (CALTRATE 600+D PLUS MINERALS) 600 mg calcium- 400 unit tab Take  by mouth.      gemfibrozil (LOPID) 600 mg tablet Take 600 mg by mouth two (2) times a day. aspirin 81 mg chewable tablet Take 81 mg by mouth daily. ferrous sulfate (SLOW FE) 142 mg (45 mg iron) ER tablet Take  by mouth two (2) times a day. rOPINIRole (REQUIP) 1 mg tablet Take 1 mg by mouth three (3) times daily. 12pm - 3pm -9pm      dextran 70-hypromellose (ARTIFICIAL TEARS) ophthalmic solution Administer 2 drops to both eyes as needed. ascorbic acid (VITAMIN C) 500 mg tablet Take 500 mg by mouth daily. timolol maleate 0.5 % drpd ophthalmic solution Administer 1 drop to both eyes daily. 9 am      latanoprost (XALATAN) 0.005 % ophthalmic solution Administer 1 Drop to both eyes nightly.                SignedAnibkenyetta Moore PA-C  12/27/2017  10:50 AM

## 2017-12-28 ENCOUNTER — PATIENT OUTREACH (OUTPATIENT)
Dept: CASE MANAGEMENT | Age: 82
End: 2017-12-28

## 2017-12-28 NOTE — ROUTINE PROCESS
Late entry:CHF teaching started post introduction to pt/family; aware of diagnosis. Planner/scale @ BS and will follow. Smoking/ ETOH/Illicit drug use cessation covered. Pt/family aware that I can not prescribe nor adjust  medications: 15mins  Start 2 L/ D FR  Palliative Care score; RATT  CHF teaching continues to pt/family. Emphasis on taking prescription meds as ordered, to keep F/U appts and to call MD STAT. CHF teaching completed, verbalize emphasis on monitoring self and report to MD:   If you gain 2 lbs in one day or 5 lbs in a week, and short of breath.  If you can not lay flat without developing short of breath or rapid breathing at night; or if it wakes you up. Develop a cough or wheezing.  If you notice swollen hands/feet/ankles or stomach with a bloated/ full feeling.  If you become confused or mentally fuzzy or dizzy.  If you notice a rapid or change in your heart rate.  If you become more exhausted all the time and unable to do the same level of activity without stopping to catch your breath. Drink no more than 8 cups a day in 8 oz. cups. Limit Cola Drinks. Your Heart can not handle any more. Stay away from salt (limit anything with salt or sodium in it). Limit to 250mg per serving. Exercise needs to be started with your Doctors approval.  Reduce stress  Pass post test via teach back, will make self available post DC ,if an questions arise. Diabetic teaching completed.  Planner/scale @ BS:  60 mins total

## 2017-12-28 NOTE — PROGRESS NOTES
This note will not be viewable in [address] Transition of Care Discharge Follow-up Questionnaire     Date/Time of Call:     12/28/2017   12:55 pm     What was the patient seen in the hospital for? Acute Respiratory failure with Hypoxia     Does the patient understand his/her diagnosis and/or treatment and what happened during the hospitalization? Patient voiced understanding of diagnosis and /or treatment. Did the patient receive discharge instructions? Patient voiced understanding of diagnosis and /or treatment. Review any discharge instructions (see notes in [facility]). Ask patient if they have any questions? Care Coordinator and patient reviewed discharge instructions per [facility]. Opportunity for questions and clarification provided. Patient verbalized understanding of instructions. Were home services ordered (nursing, PT, OT, ST, etc.)? No   If so, has the first visit occurred? If not, why? (Assist with coordination of services if necessary.)   n/a   Was any DME ordered? No     If so, has it been received? If not, why?  (Assist with coordination of arranging DME orders if necessary.)   n/a   Complete a review of all medications (new, continued and discontinued meds per the D/C instructions and medication tab in [facility]). [facility] medication list reviewed, confirmed and are being taken as instructed upon discharge. Were all new prescriptions filled? If not, why?  (Assist with obtainment of medications if necessary.)   yes     Does the patient understand the purpose and dosing instructions for all medications? (If patient has questions, provide explanation and education.)   Patient voiced understanding. Does the patient have any problems in performing ADLs? (If patient is unable to perform ADLs  what is the limiting factor(s)?   Do they have a support system that can assist? If no support system is present, discuss possible assistance that they may be able to obtain.)   Patient reports being independent with ADLs. Patient has spouse and family available to help if/when needed. Does the patient have all follow-up appointments scheduled? 7 day f/up with PCP?    7-14 day f/up with specialist?    If f/up has not been made  what actions has the care coordinator made to accomplish this? Has transportation been arranged? [facility]monary follow-up should be within 7 days of discharge; all others should have PCP follow-up within 7 days of discharge; follow-ups with other specialists should be within 7-14 days of discharge.)   Patient declines 3 way call with Care Coordinator to schedule hospital follow up appointment with PCP. This Care Coordinator also offered to obtain an appointment on behalf of the patient with a return call with appointment date and time. Patient declines and reports being scheduled for hospital follow up with PCP on 1/3/2018 @11:30am    CONFIRMED APPTS:  Cardiology: 1/4/2018 @ 10:15am    Patient has transportation to/from appointments. Any other questions or concerns expressed by the patient? Opportunity to voice questions or concerns provided. No other needs identified. Schedule next appointment with [facility] Coordinator or refer to RN Case Manager/  per the workflow guidelines. If referred for CCM  what RN care manager was the referral assigned? This Care Coordinator will schedule chart review and/or follow up calls per protocol.      RONI Call Completed By:   Sujata Barajas, [address] [facility] Coordinator / [facility] AND [facility]

## 2018-01-03 ENCOUNTER — PATIENT OUTREACH (OUTPATIENT)
Dept: CASE MANAGEMENT | Age: 83
End: 2018-01-03

## 2018-01-08 ENCOUNTER — HOSPITAL ENCOUNTER (OUTPATIENT)
Dept: GENERAL RADIOLOGY | Age: 83
Discharge: HOME | End: 2018-01-08
Payer: MEDICARE

## 2018-01-08 DIAGNOSIS — I50.33 DIASTOLIC CHF, ACUTE ON CHRONIC (HCC): ICD-10-CM

## 2018-01-08 PROCEDURE — 71046 X-RAY EXAM CHEST 2 VIEWS: CPT

## 2018-01-09 NOTE — PROGRESS NOTES
This note will not be viewable in [address] Follow up    Outreach call for follow up unsuccessful. Unable to reach patient. Left message for call back. Will close case at this time. Will reopen case if return call is received.

## 2018-01-09 NOTE — PROGRESS NOTES
This note will not be viewable in [address] Follow up     Call placed to patient for follow up. Outreach unsuccessful. Left message for call back. Will attempt another outreach within 24 hours.

## 2018-02-06 ENCOUNTER — APPOINTMENT (OUTPATIENT)
Dept: GENERAL RADIOLOGY | Age: 83
End: 2018-02-06
Attending: EMERGENCY MEDICINE
Payer: MEDICARE

## 2018-02-06 ENCOUNTER — HOSPITAL ENCOUNTER (OUTPATIENT)
Age: 83
Setting detail: OBSERVATION
Discharge: HOME | End: 2018-02-08
Attending: EMERGENCY MEDICINE | Admitting: INTERNAL MEDICINE
Payer: MEDICARE

## 2018-02-06 DIAGNOSIS — R07.9 ACUTE CHEST PAIN: Primary | ICD-10-CM

## 2018-02-06 PROBLEM — I10 ACCELERATED HYPERTENSION: Status: ACTIVE | Noted: 2018-02-06

## 2018-02-06 PROBLEM — I44.7 LEFT BUNDLE BRANCH BLOCK: Status: ACTIVE | Noted: 2018-02-06

## 2018-02-06 PROBLEM — I50.32 DIASTOLIC CHF, CHRONIC (HCC): Status: ACTIVE | Noted: 2018-02-06

## 2018-02-06 LAB
ALBUMIN SERPL-MCNC: 3.8 G/DL (ref 3.2–4.6)
ALBUMIN/GLOB SERPL: 1.1 {RATIO} (ref 1.2–3.5)
ALP SERPL-CCNC: 86 U/L (ref 50–136)
ALT SERPL-CCNC: 18 U/L (ref 12–65)
ANION GAP SERPL CALC-SCNC: 9 MMOL/L (ref 7–16)
AST SERPL-CCNC: 26 U/L (ref 15–37)
ATRIAL RATE: 53 BPM
BASOPHILS # BLD: 0 K/UL (ref 0–0.2)
BASOPHILS NFR BLD: 1 % (ref 0–2)
BILIRUB SERPL-MCNC: 0.3 MG/DL (ref 0.2–1.1)
BNP SERPL-MCNC: 687 PG/ML
BUN SERPL-MCNC: 39 MG/DL (ref 8–23)
CALCIUM SERPL-MCNC: 10.3 MG/DL (ref 8.3–10.4)
CALCULATED P AXIS, ECG09: 88 DEGREES
CALCULATED R AXIS, ECG10: -49 DEGREES
CALCULATED T AXIS, ECG11: 87 DEGREES
CHLORIDE SERPL-SCNC: 109 MMOL/L (ref 98–107)
CO2 SERPL-SCNC: 21 MMOL/L (ref 21–32)
CREAT SERPL-MCNC: 1.04 MG/DL (ref 0.6–1)
DIAGNOSIS, 93000: NORMAL
DIFFERENTIAL METHOD BLD: ABNORMAL
EOSINOPHIL # BLD: 0.1 K/UL (ref 0–0.8)
EOSINOPHIL NFR BLD: 2 % (ref 0.5–7.8)
ERYTHROCYTE [DISTWIDTH] IN BLOOD BY AUTOMATED COUNT: 14.6 % (ref 11.9–14.6)
GLOBULIN SER CALC-MCNC: 3.4 G/DL (ref 2.3–3.5)
GLUCOSE SERPL-MCNC: 100 MG/DL (ref 65–100)
HCT VFR BLD AUTO: 30 % (ref 35.8–46.3)
HGB BLD-MCNC: 9.7 G/DL (ref 11.7–15.4)
IMM GRANULOCYTES # BLD: 0 K/UL (ref 0–0.5)
IMM GRANULOCYTES NFR BLD AUTO: 1 % (ref 0–5)
LYMPHOCYTES # BLD: 1.5 K/UL (ref 0.5–4.6)
LYMPHOCYTES NFR BLD: 20 % (ref 13–44)
MAGNESIUM SERPL-MCNC: 2.5 MG/DL (ref 1.8–2.4)
MCH RBC QN AUTO: 32.3 PG (ref 26.1–32.9)
MCHC RBC AUTO-ENTMCNC: 32.3 G/DL (ref 31.4–35)
MCV RBC AUTO: 100 FL (ref 79.6–97.8)
MONOCYTES # BLD: 0.9 K/UL (ref 0.1–1.3)
MONOCYTES NFR BLD: 12 % (ref 4–12)
NEUTS SEG # BLD: 5 K/UL (ref 1.7–8.2)
NEUTS SEG NFR BLD: 64 % (ref 43–78)
P-R INTERVAL, ECG05: 278 MS
PLATELET # BLD AUTO: 289 K/UL (ref 150–450)
PMV BLD AUTO: 9.8 FL (ref 10.8–14.1)
POTASSIUM SERPL-SCNC: 5 MMOL/L (ref 3.5–5.1)
PROT SERPL-MCNC: 7.2 G/DL (ref 6.3–8.2)
Q-T INTERVAL, ECG07: 462 MS
QRS DURATION, ECG06: 128 MS
QTC CALCULATION (BEZET), ECG08: 433 MS
RBC # BLD AUTO: 3 M/UL (ref 4.05–5.25)
SODIUM SERPL-SCNC: 139 MMOL/L (ref 136–145)
TROPONIN I BLD-MCNC: 0 NG/ML (ref 0.02–0.05)
TROPONIN I SERPL-MCNC: <0.02 NG/ML (ref 0.02–0.05)
TROPONIN I SERPL-MCNC: <0.02 NG/ML (ref 0.02–0.05)
VENTRICULAR RATE, ECG03: 53 BPM
WBC # BLD AUTO: 7.7 K/UL (ref 4.3–11.1)

## 2018-02-06 PROCEDURE — 84484 ASSAY OF TROPONIN QUANT: CPT | Performed by: EMERGENCY MEDICINE

## 2018-02-06 PROCEDURE — 74011250636 HC RX REV CODE- 250/636: Performed by: EMERGENCY MEDICINE

## 2018-02-06 PROCEDURE — 74011250637 HC RX REV CODE- 250/637: Performed by: PHYSICIAN ASSISTANT

## 2018-02-06 PROCEDURE — 83880 ASSAY OF NATRIURETIC PEPTIDE: CPT | Performed by: EMERGENCY MEDICINE

## 2018-02-06 PROCEDURE — 96374 THER/PROPH/DIAG INJ IV PUSH: CPT | Performed by: EMERGENCY MEDICINE

## 2018-02-06 PROCEDURE — 80053 COMPREHEN METABOLIC PANEL: CPT | Performed by: EMERGENCY MEDICINE

## 2018-02-06 PROCEDURE — 99218 HC RM OBSERVATION: CPT

## 2018-02-06 PROCEDURE — 83735 ASSAY OF MAGNESIUM: CPT | Performed by: EMERGENCY MEDICINE

## 2018-02-06 PROCEDURE — 93005 ELECTROCARDIOGRAM TRACING: CPT | Performed by: EMERGENCY MEDICINE

## 2018-02-06 PROCEDURE — 74011000250 HC RX REV CODE- 250: Performed by: PHYSICIAN ASSISTANT

## 2018-02-06 PROCEDURE — 99285 EMERGENCY DEPT VISIT HI MDM: CPT | Performed by: EMERGENCY MEDICINE

## 2018-02-06 PROCEDURE — 85025 COMPLETE CBC W/AUTO DIFF WBC: CPT | Performed by: EMERGENCY MEDICINE

## 2018-02-06 PROCEDURE — 74011250637 HC RX REV CODE- 250/637: Performed by: EMERGENCY MEDICINE

## 2018-02-06 PROCEDURE — 71046 X-RAY EXAM CHEST 2 VIEWS: CPT

## 2018-02-06 RX ORDER — SODIUM CHLORIDE 0.9 % (FLUSH) 0.9 %
5-10 SYRINGE (ML) INJECTION AS NEEDED
Status: DISCONTINUED | OUTPATIENT
Start: 2018-02-06 | End: 2018-02-08 | Stop reason: HOSPADM

## 2018-02-06 RX ORDER — NITROGLYCERIN 0.4 MG/1
0.4 TABLET SUBLINGUAL
Status: DISCONTINUED | OUTPATIENT
Start: 2018-02-06 | End: 2018-02-08 | Stop reason: HOSPADM

## 2018-02-06 RX ORDER — TIMOLOL MALEATE 5 MG/ML
1 SOLUTION/ DROPS OPHTHALMIC DAILY
Status: DISCONTINUED | OUTPATIENT
Start: 2018-02-07 | End: 2018-02-08 | Stop reason: HOSPADM

## 2018-02-06 RX ORDER — DULOXETIN HYDROCHLORIDE 20 MG/1
20 CAPSULE, DELAYED RELEASE ORAL DAILY
Status: DISCONTINUED | OUTPATIENT
Start: 2018-02-07 | End: 2018-02-08 | Stop reason: HOSPADM

## 2018-02-06 RX ORDER — ROPINIROLE 0.5 MG/1
1 TABLET, FILM COATED ORAL 3 TIMES DAILY
Status: DISCONTINUED | OUTPATIENT
Start: 2018-02-06 | End: 2018-02-08 | Stop reason: HOSPADM

## 2018-02-06 RX ORDER — CLOPIDOGREL BISULFATE 75 MG/1
75 TABLET ORAL DAILY
Status: DISCONTINUED | OUTPATIENT
Start: 2018-02-07 | End: 2018-02-08 | Stop reason: HOSPADM

## 2018-02-06 RX ORDER — ASPIRIN 325 MG
325 TABLET ORAL
Status: COMPLETED | OUTPATIENT
Start: 2018-02-06 | End: 2018-02-06

## 2018-02-06 RX ORDER — HYDROCODONE BITARTRATE AND ACETAMINOPHEN 5; 325 MG/1; MG/1
1 TABLET ORAL 2 TIMES DAILY
Status: DISCONTINUED | OUTPATIENT
Start: 2018-02-06 | End: 2018-02-08 | Stop reason: HOSPADM

## 2018-02-06 RX ORDER — ONDANSETRON 2 MG/ML
4 INJECTION INTRAMUSCULAR; INTRAVENOUS
Status: DISCONTINUED | OUTPATIENT
Start: 2018-02-06 | End: 2018-02-08 | Stop reason: HOSPADM

## 2018-02-06 RX ORDER — FUROSEMIDE 10 MG/ML
40 INJECTION INTRAMUSCULAR; INTRAVENOUS
Status: COMPLETED | OUTPATIENT
Start: 2018-02-06 | End: 2018-02-06

## 2018-02-06 RX ORDER — GEMFIBROZIL 600 MG/1
600 TABLET, FILM COATED ORAL 2 TIMES DAILY
Status: DISCONTINUED | OUTPATIENT
Start: 2018-02-06 | End: 2018-02-08 | Stop reason: HOSPADM

## 2018-02-06 RX ORDER — AMIODARONE HYDROCHLORIDE 200 MG/1
100 TABLET ORAL DAILY
Status: DISCONTINUED | OUTPATIENT
Start: 2018-02-07 | End: 2018-02-08 | Stop reason: HOSPADM

## 2018-02-06 RX ORDER — PANTOPRAZOLE SODIUM 40 MG/1
40 TABLET, DELAYED RELEASE ORAL
Status: DISCONTINUED | OUTPATIENT
Start: 2018-02-07 | End: 2018-02-08 | Stop reason: HOSPADM

## 2018-02-06 RX ORDER — FUROSEMIDE 10 MG/ML
40 INJECTION INTRAMUSCULAR; INTRAVENOUS EVERY 12 HOURS
Status: DISCONTINUED | OUTPATIENT
Start: 2018-02-06 | End: 2018-02-07

## 2018-02-06 RX ORDER — LATANOPROST 50 UG/ML
1 SOLUTION/ DROPS OPHTHALMIC
Status: DISCONTINUED | OUTPATIENT
Start: 2018-02-06 | End: 2018-02-08 | Stop reason: HOSPADM

## 2018-02-06 RX ORDER — GUAIFENESIN 100 MG/5ML
81 LIQUID (ML) ORAL DAILY
Status: DISCONTINUED | OUTPATIENT
Start: 2018-02-07 | End: 2018-02-08 | Stop reason: HOSPADM

## 2018-02-06 RX ORDER — METOPROLOL SUCCINATE 25 MG/1
25 TABLET, EXTENDED RELEASE ORAL DAILY
Status: DISCONTINUED | OUTPATIENT
Start: 2018-02-07 | End: 2018-02-08 | Stop reason: HOSPADM

## 2018-02-06 RX ORDER — CELECOXIB 100 MG/1
100 CAPSULE ORAL 2 TIMES DAILY
Status: DISCONTINUED | OUTPATIENT
Start: 2018-02-06 | End: 2018-02-08 | Stop reason: HOSPADM

## 2018-02-06 RX ORDER — LANOLIN ALCOHOL/MO/W.PET/CERES
325 CREAM (GRAM) TOPICAL 2 TIMES DAILY WITH MEALS
Status: DISCONTINUED | OUTPATIENT
Start: 2018-02-07 | End: 2018-02-08 | Stop reason: HOSPADM

## 2018-02-06 RX ORDER — ACETAMINOPHEN 325 MG/1
650 TABLET ORAL
Status: DISCONTINUED | OUTPATIENT
Start: 2018-02-06 | End: 2018-02-08 | Stop reason: HOSPADM

## 2018-02-06 RX ORDER — ROSUVASTATIN CALCIUM 20 MG/1
40 TABLET, COATED ORAL
Status: DISCONTINUED | OUTPATIENT
Start: 2018-02-06 | End: 2018-02-08 | Stop reason: HOSPADM

## 2018-02-06 RX ADMIN — GEMFIBROZIL 600 MG: 600 TABLET ORAL at 23:11

## 2018-02-06 RX ADMIN — CELECOXIB 100 MG: 100 CAPSULE ORAL at 23:12

## 2018-02-06 RX ADMIN — NITROGLYCERIN 1 INCH: 20 OINTMENT TOPICAL at 14:54

## 2018-02-06 RX ADMIN — FUROSEMIDE 40 MG: 10 INJECTION, SOLUTION INTRAMUSCULAR; INTRAVENOUS at 14:56

## 2018-02-06 RX ADMIN — LATANOPROST 1 DROP: 50 SOLUTION OPHTHALMIC at 23:12

## 2018-02-06 RX ADMIN — ROPINIROLE HYDROCHLORIDE 1 MG: 0.5 TABLET, FILM COATED ORAL at 17:09

## 2018-02-06 RX ADMIN — ROSUVASTATIN CALCIUM 40 MG: 20 TABLET, FILM COATED ORAL at 23:12

## 2018-02-06 RX ADMIN — ASPIRIN 325 MG ORAL TABLET 325 MG: 325 PILL ORAL at 14:54

## 2018-02-06 RX ADMIN — HYDROCODONE BITARTRATE AND ACETAMINOPHEN 1 TABLET: 5; 325 TABLET ORAL at 21:42

## 2018-02-06 RX ADMIN — ROPINIROLE HYDROCHLORIDE 1 MG: 0.5 TABLET, FILM COATED ORAL at 23:14

## 2018-02-06 NOTE — IP AVS SNAPSHOT
[facility] 
 
 
 [address] 
[phone] Patient: Rainer Patel MRN: [id number] About your hospitalization You were admitted on:  February 6, 2018 You last received care in the:  [facility] 3 TELEMETRY You were discharged on:  February 8, 2018 Why you were hospitalized Your primary diagnosis was:  Accelerated Hypertension Your diagnoses also included:  Left Bundle Branch Block, Cad (Coronary Artery Disease), Paroxysmal Atrial Fibrillation (Hcc), Diastolic Chf, Chronic (Hcc) Follow-up Information Follow up With Details Comments Contact Info Helio Almanzar MD  As needed [address] [facility] [address] [place] [address] 
[phone] [facility] On 2/14/2018 Dr. Bravo Rosen. Yodit Jernigan @ [phone] in the [facility] office [address] [id number] [phone] Your Scheduled Appointments Wednesday February 14, 2018  8:15 AM [facility] HOSPITAL FOLLOW-UP with Johnnie Stevens MD  
[facility] ([address] [place] [address]) [address] [place] [address]  
[phone] Friday March 02, 2018 11:00 AM [facility] Office Visit with Johnnie Stevens MD  
[facility] ([address] [place] [address]) [address] [facility]  
[phone] Discharge Orders Procedure Order Date Status Priority Quantity Spec Type Associated Dx METABOLIC PANEL, BASIC 61/48/21 0909 Future Routine 1 Serum Comments:  Have drawn on Monday 2-12-18 DX: CHF MAGNESIUM 02/08/18 0909 Future Routine 1 Serum Comments:  Have drawn on 2-12-18 DX: CHF A check shira indicates which time of day the medication should be taken. My Medications START taking these medications Instructions Each Dose to Equal  
 Morning Noon Evening Bedtime  
 nitroglycerin 0.4 mg SL tablet Commonly known as:  NITROSTAT Your next dose is:  As needed 1 Tab by SubLINGual route every five (5) minutes as needed for Chest Pain. 0.4 mg  
    
   
   
   
  
  
CHANGE how you take these medications Instructions Each Dose to Equal  
 Morning Noon Evening Bedtime  
 furosemide 20 mg tablet Commonly known as:  LASIX What changed:   
- medication strength 
- how much to take Your next dose is:  2-9-2018 Take 1 Tab by mouth daily. 20 mg CONTINUE taking these medications Instructions Each Dose to Equal  
 Morning Noon Evening Bedtime  
 amiodarone 200 mg tablet Commonly known as:  CORDARONE Your next dose is:  2-9-2018 Take 0.5 Tabs by mouth daily. 100 mg ARTIFICIAL TEARS([id number]) ophthalmic solution Generic drug:  dextran 70-hypromellose Your next dose is:  As neeed Administer 2 drops to both eyes as needed. 2 Drop  
    
   
   
   
  
 aspirin 81 mg chewable tablet Your next dose is:  2-9-2018 Take 81 mg by mouth daily. 81 mg CALTRATE 600+D PLUS MINERALS 600 mg calcium- 400 unit Tab Generic drug:  Calcium Carbonate-Vit D3-Min Take  by mouth. celecoxib 100 mg capsule Commonly known as:  CELEBREX Your next dose is:  2-8-2018 Take  by mouth two (2) times a day. clopidogrel 75 mg Tab Commonly known as:  PLAVIX Your next dose is:  2-9-2018 Take 1 Tab by mouth daily. 75 mg  
    
  
   
   
   
  
 CRESTOR 40 mg tablet Generic drug:  rosuvastatin Your next dose is:  2-8-2018 Take 40 mg by mouth nightly. 40 mg DULoxetine 20 mg capsule Commonly known as:  CYMBALTA Your next dose is:  2-9-2018 Take 20 mg by mouth daily. 20 mg  
    
  
   
   
   
  
 ferrous sulfate 142 mg (45 mg iron) ER tablet Commonly known as:  SLOW FE  
Your next dose is:  As taking at home Take  by mouth three (3) times daily (with meals). gemfibrozil 600 mg tablet Commonly known as:  LOPID Your next dose is:  2-8-2018 Take 600 mg by mouth two (2) times a day. 600 mg HYDROcodone-acetaminophen 5-325 mg per tablet Commonly known as:  [place] [facility] Your next dose is:  As neded Take 1 Tab by mouth two (2) times a day. 1 Tab  
    
   
   
   
  
 metoprolol succinate 25 mg XL tablet Commonly known as:  TOPROL-XL Your next dose is:  2-9-2018 Take  by mouth daily. pantoprazole 40 mg tablet Commonly known as:  PROTONIX Your next dose is:  2-9-2018 Take 1 Tab by mouth Daily (before breakfast). 40 mg  
    
  
   
   
   
  
 REQUIP 1 mg tablet Generic drug:  rOPINIRole Your next dose is:  As taking at home Take 1 mg by mouth three (3) times daily. 12pm - 3pm -9pm  
 1 mg SYNTHROID 137 mcg tablet Generic drug:  levothyroxine Your next dose is:  2-9-2018 Take  by mouth Daily (before breakfast). timolol maleate 0.5 % Drpd ophthalmic solution Administer 1 drop to both eyes daily. 9 am  
 1 Drop VITAMIN C 500 mg tablet Generic drug:  ascorbic acid (vitamin C) Take 500 mg by mouth daily. 500 mg  
    
   
   
   
  
 XALATAN 0.005 % ophthalmic solution Generic drug:  latanoprost  
Your next dose is:  2-8-2018 Administer 1 Drop to both eyes nightly. 1 Drop Where to Get Your Medications Information on where to get these meds will be given to you by the nurse or doctor. ! Ask your nurse or doctor about these medications  
  furosemide 20 mg tablet  
 nitroglycerin 0.4 mg SL tablet Discharge Instructions Learning About Fluid Overload What is fluid overload? Fluid overload means that your body has too much water. The extra fluid in your body can raise your blood pressure and force your heart to work harder. It can also make it hard for you to breathe. Most of your body is made up of water. The body uses minerals like sodium and potassium to help organs such as your heart, kidneys, and liver balance how much water you need. For example, the heart pumps blood to move water around the body. And the kidneys work to get rid of the water that the body doesn't need. Health conditions like kidney disease, heart failure, and cirrhosis can cause fluid overload. Other things can cause extra fluid to build up. IV fluids, some medicines, too much salt (sodium) from food, and certain medical treatments can sometimes cause this fluid increase. What are the symptoms? Some of the most common symptoms are: 
· Gaining weight over a short period of time. · Swelling in the ankles or legs. · Shortness of breath. How is it treated? The goal of treatment is to remove the extra fluid in your body. Your treatment will depend on the cause. Your doctor may: · Give you medicines, such as diuretics (also called \"water pills\"). They help your body get rid of the extra fluid. · Restrict your fluid or salt intake. Follow-up care is a key part of your treatment and safety. Be sure to make and go to all appointments, and call your doctor if you are having problems. It's also a good idea to know your test results and keep a list of the medicines you take. Where can you learn more? Go to http://bal-rosa isela.info/. Enter O110 in the search box to learn more about \"Learning About Fluid Overload. \" Current as of: September 21, 2016 Content Version: 11.4 © 1314-7846 [employer].  Care instructions adapted under license by [employer] (which disclaims liability or warranty for this information). If you have questions about a medical condition or this instruction, always ask your healthcare professional. [address] any warranty or liability for your use of this information. High Blood Pressure: Care Instructions Your Care Instructions If your blood pressure is usually above 140/90, you have high blood pressure, or hypertension. That means the top number is 140 or higher or the bottom number is 90 or higher, or both. Despite what a lot of people think, high blood pressure usually doesn't cause headaches or make you feel dizzy or lightheaded. It usually has no symptoms. But it does increase your risk for heart attack, stroke, and kidney or eye damage. The higher your blood pressure, the more your risk increases. Your doctor will give you a goal for your blood pressure. Your goal will be based on your health and your age. An example of a goal is to keep your blood pressure below 140/90. Lifestyle changes, such as eating healthy and being active, are always important to help lower blood pressure. You might also take medicine to reach your blood pressure goal. 
Follow-up care is a key part of your treatment and safety. Be sure to make and go to all appointments, and call your doctor if you are having problems. It's also a good idea to know your test results and keep a list of the medicines you take. How can you care for yourself at home? Medical treatment · If you stop taking your medicine, your blood pressure will go back up. You may take one or more types of medicine to lower your blood pressure. Be safe with medicines. Take your medicine exactly as prescribed. Call your doctor if you think you are having a problem with your medicine. · Talk to your doctor before you start taking aspirin every day. Aspirin can help certain people lower their risk of a heart attack or stroke.  But taking aspirin isn't right for everyone, because it can cause serious bleeding. · See your doctor regularly. You may need to see the doctor more often at first or until your blood pressure comes down. · If you are taking blood pressure medicine, talk to your doctor before you take decongestants or anti-inflammatory medicine, such as ibuprofen. Some of these medicines can raise blood pressure. · Learn how to check your blood pressure at home. Lifestyle changes · Stay at a healthy weight. This is especially important if you put on weight around the waist. Losing even 10 pounds can help you lower your blood pressure. · If your doctor recommends it, get more exercise. Walking is a good choice. Bit by bit, increase the amount you walk every day. Try for at least 30 minutes on most days of the week. You also may want to swim, bike, or do other activities. · Avoid or limit alcohol. Talk to your doctor about whether you can drink any alcohol. · Try to limit how much sodium you eat to less than 2,300 milligrams (mg) a day. Your doctor may ask you to try to eat less than 1,500 mg a day. · Eat plenty of fruits (such as bananas and oranges), vegetables, legumes, whole grains, and low-fat dairy products. · Lower the amount of saturated fat in your diet. Saturated fat is found in animal products such as milk, cheese, and meat. Limiting these foods may help you lose weight and also lower your risk for heart disease. · Do not smoke. Smoking increases your risk for heart attack and stroke. If you need help quitting, talk to your doctor about stop-smoking programs and medicines. These can increase your chances of quitting for good. When should you call for help? Call 911 anytime you think you may need emergency care. This may mean having symptoms that suggest that your blood pressure is causing a serious heart or blood vessel problem. Your blood pressure may be over 180/110. ? For example, call 911 if: 
? · You have symptoms of a heart attack. These may include: ¨ Chest pain or pressure, or a strange feeling in the chest. 
¨ Sweating. ¨ Shortness of breath. ¨ Nausea or vomiting. ¨ Pain, pressure, or a strange feeling in the back, neck, jaw, or upper belly or in one or both shoulders or arms. ¨ Lightheadedness or sudden weakness. ¨ A fast or irregular heartbeat. ? · You have symptoms of a stroke. These may include: 
¨ Sudden numbness, tingling, weakness, or loss of movement in your face, arm, or leg, especially on only one side of your body. ¨ Sudden vision changes. ¨ Sudden trouble speaking. ¨ Sudden confusion or trouble understanding simple statements. ¨ Sudden problems with walking or balance. ¨ A sudden, severe headache that is different from past headaches. ? · You have severe back or belly pain. ?Do not wait until your blood pressure comes down on its own. Get help right away. ?Call your doctor now or seek immediate care if: 
? · Your blood pressure is much higher than normal (such as 180/110 or higher), but you don't have symptoms. ? · You think high blood pressure is causing symptoms, such as: ¨ Severe headache. ¨ Blurry vision. ? Watch closely for changes in your health, and be sure to contact your doctor if: 
? · Your blood pressure measures 140/90 or higher at least 2 times. That means the top number is 140 or higher or the bottom number is 90 or higher, or both. ? · You think you may be having side effects from your blood pressure medicine. ? · Your blood pressure is usually normal, but it goes above normal at least 2 times. Where can you learn more? Go to http://bal-rosa isela.info/. Enter E035 in the search box to learn more about \"High Blood Pressure: Care Instructions. \" Current as of: September 21, 2016 Content Version: 11.4 © 7704-2992 [employer].  Care instructions adapted under license by [employer] (which disclaims liability or warranty for this information). If you have questions about a medical condition or this instruction, always ask your healthcare professional. [address]  any warranty or liability for your use of this information. [employer] Announcement We are excited to announce that we are making your provider's discharge notes available to you in [employer]. You will see these notes when they are completed and signed by the physician that discharged you from your recent hospital stay. If you have any questions or concerns about any information you see in [employer], please call the Health Information Department where you were seen or reach out to your Primary Care Provider for more information about your plan of care. Introducing [facility]! Dear Yoly Baltazar: Thank you for requesting a [employer] account. Our records indicate that you already have an active [employer] account. You can access your account anytime at [email or web address] Did you know that you can access your hospital and ER discharge instructions at any time in [employer]? You can also review all of your test results from your hospital stay or ER visit. Additional Information If you have questions, please visit the Frequently Asked Questions section of the [employer] website at [email or web address] Remember, [employer] is NOT to be used for urgent needs. For medical emergencies, dial 911. Now available from your iPhone and Android! Providers Seen During Your Hospitalization Provider Specialty Primary office phone aCrlitos Blanc MD Emergency Medicine [phone] Muriel Starr MD Cardiology [phone] Your Primary Care Physician (PCP) Primary Care Physician Office Phone Office Fax Yosi davis [facility] [phone] You are allergic to the following Allergen Reactions Versed (Midazolam) Anxiety Delirium Recent Documentation Height Weight BMI OB Status Smoking Status 1.524 m 54.6 kg 23.51 kg/m2 Postmenopausal Former Smoker Emergency Contacts Name Discharge Info Relation Home Work Mobile Maikol Fernandez  Child [2] [phone] Patient Belongings The following personal items are in your possession at time of discharge: 
  Dental Appliances: None  Visual Aid: Glasses      Home Medications: None   Jewelry: Watch, Ring  Clothing: At bedside    Other Valuables: None Please provide this summary of care documentation to your next provider. Signatures-by signing, you are acknowledging that this After Visit Summary has been reviewed with you and you have received a copy. Patient Signature:  ____________________________________________________________ Date:  ____________________________________________________________  
  
[facility] Provider Signature:  ____________________________________________________________ Date:  ____________________________________________________________

## 2018-02-06 NOTE — H&P
[facility] CARDIOLOGY History &Physical                 Primary Cardiologist: Dr Claudette Cabal    Primary Care Physician: Dr Jessie Hercules    Admitting Physician: Dr Maximilian Aponte:     Patient is a 80 y.o. female who presents with dyspnea. She has a h/o CVA, htn, CAD s/p CABG w C 7-2017 showing patent LIMA to LAD, SVG to second OM and RCA, PAF in NSR on amiodarone, dHF w echo  w EF 60-65% with mod NERY, mild-mod MR, chronic anemia and neck pain. She was admitted  for diuresis for dHF, discharged on lasix 40 mg every day, followed up with PCP 1-23-18 who stopped lasix and told her to take it prn. She lives at an independent living but today was feeling badly and went to the [facility] where she was noted to have a /78 and EMS was called. Pt and family give history, daughter is unsure whether pt accurate with history. They are unaware of any complaints prior to today, though pt reports increasing SOB x 1 week, with dyspnea when she ambulates 50 feet to her apt. She has chronic RLE edema due to lymphedema. Her weight is unchanged at 121 lbs two weeks ago to today (checked daily). She is on a low NA diet. She reports CP x 1-2 weeks (not mentioned to her daughter) which has been epigastric/substernal associated with arm pain, though her arms have been sore for a while. She has had nausea with this pain and took a stomach medicine that helped at first with nausea and CP but did not help this morning. Initially she said she was fine until this morning when all her symptoms started though with further questioning she reports these symptoms for 1-2 weeks. This morning she states she felt very bad, nausea, abdominal pain, diffuse body aches, felt unsteady, not dizzy but light headed, and felt like she was going to pass out. She felt awful, went to the medical center at her apartment and EMS was called. In the ER EKG shows new L BBB w NSR w first degree block w rate 57.   /85, CXR small B effusions and interstitial inflammation suggesting fluid overload, , WBC 7.7, hgb 9.7, K 5, cr 1.04. No recent F/C, bleeding, falls.      Past Medical History:   Diagnosis Date    Anemia associated with acute blood loss 7/24/2011    Atrial fibrillation ([facility] 7/13/2011    CAD (coronary artery disease) 1997    MI, PCI , CABG 7/13/2011    Chronic diastolic heart failure ([facility] 10/29/2015    Chronic pain     Constipation- no BM since 7/30 8/5/2011    Coronary atherosclerosis of native coronary artery--PTCA 1997 LAD/diag 7/1/2011    CVA (cerebrovascular accident) ([facility] 3/29/2009    mild memory issues, rls, afib    Diastolic CHF, acute on chronic (HCC) 12/25/2017    GI bleed 1/11/2015    HEMOTHORAX ON LEFT  7/24/2011    Hyperlipemia 3/29/2009    Hypertension 3/29/2009    Hypertension, benign 10/29/2015    Hyponatremia 7/24/2011    Hypothyroidism 7/7/2011    Leucocytosis 8/3/2011    Leukocytosis 1/11/2015    Irene-Starr tear 1/12/2015    Mitral valve insufficiency 4/2/2009    Paroxysmal atrial fibrillation (HCC) 10/29/2015    Pleural effusion, bilateral 7/23/2011    Right thorocetesis 1200ml removed 7/23/11 and 1000ml removed 7/28/11 Left thorocentesis 1100ml removed 7/23/11     S/p thoracotomy- evauation of hemothorax 8/4/2011    Unspecified hypothyroidism 3/30/2009      Past Surgical History:   Procedure Laterality Date    CABG, ARTERY-VEIN, THREE  7/7/2011    HX BACK SURGERY      HX ORTHOPAEDIC  4 years ago    neck    HX ORTHOPAEDIC  10 years    back    HX OTHER SURGICAL      steroid injection neck, 2 weeks ago      Allergies   Allergen Reactions    Versed [Midazolam] Anxiety     Delirium      Social History   Substance Use Topics    Smoking status: Former Smoker     Quit date: 1951    Smokeless tobacco: Never Used    Alcohol use No      FH:   Family History   Problem Relation Age of Onset    Heart Attack Father 40        Review of Systems  General: no weight change-stable at 121, + weakness, no fever or chills  Skin: no rashes, lumps, or other skin changes  HEENT: no headache, dizziness, lightheadedness, vision changes, hearing changes, tinnitus, vertigo, sinus pressure/pain, bleeding gums, sore throat, or hoarseness  Neck: no swollen glands, goiter, pain or stiffness  Respiratory: no cough, sputum, hemoptysis, + dyspnea, no wheezing  Cardiovascular: + as per HPI  Gastrointestinal: + GERD, nausea, abdominal pain  Urinary: no frequency, urgency , hematuria, burning/pain with urination, recent flank pain, polyuria, nocturia, or difficulty urinating  Peripheral Vascular: no claudication, leg cramps, prior DVTs, swelling of calves, legs, or feet, color change, or swelling with redness or tenderness  Musculoskeletal: + neck pain  Psychiatric: no depression or excessive stress  Neurological: + RLS   Hematologic: + anemia on iron   Endocrine: + thyroid problems on synthroid, no heat or cold intolerance, excessive sweating, polyuria, polydipsia, no diabetes.         Objective:       Visit Vitals    /85    Pulse (!) 57    Temp 97.8 °F (36.6 °C)    Resp 18    Ht 5' (1.524 m)    Wt 54.9 kg (121 lb)    SpO2 95%    BMI 23.63 kg/m2               Physical Exam:  General: Well Developed, Well Nourished, No Acute Distress  HEENT: pupils equal and round, no abnormalities noted  Neck: supple, no JVD, no carotid bruits  Heart: S1S2 with RRR without murmur  Lungs: Few crackles at bases   Abd: soft, nontender, nondistended, with good bowel sounds  Ext: warm, no edema  Skin: warm and dry  Psychiatric: Normal mood and affect  Neurologic: Alert and oriented X 3      ECG:  new L BBB w NSR w first degree block w rate 57    Data Review:      Recent Results (from the past 24 hour(s))   EKG, 12 LEAD, INITIAL    Collection Time: 02/06/18  1:07 PM   Result Value Ref Range    Ventricular Rate 53 BPM    Atrial Rate 53 BPM    P-R Interval 278 ms    QRS Duration 128 ms    Q-T Interval 462 ms    QTC Calculation (Bezet) 433 ms    Calculated P Axis 88 degrees    Calculated R Axis -49 degrees    Calculated T Axis 87 degrees    Diagnosis       Sinus bradycardia with 1st degree A-V block  Left axis deviation  Left bundle branch block  Abnormal ECG  When compared with ECG of 26-DEC-2017 23:18,  Left bundle branch block is now Present  Criteria for Septal infarct are no longer Present  Confirmed by Tj Gamez MD (), ALDO LAZAR ([id number]) on 2/6/2018 2:09:32 PM     CBC WITH AUTOMATED DIFF    Collection Time: 02/06/18  1:17 PM   Result Value Ref Range    WBC 7.7 4.3 - 11.1 K/uL    RBC 3.00 (L) 4.05 - 5.25 M/uL    HGB 9.7 (L) 11.7 - 15.4 g/dL    HCT 30.0 (L) 35.8 - 46.3 %    .0 (H) 79.6 - 97.8 FL    MCH 32.3 26.1 - 32.9 PG    MCHC 32.3 31.4 - 35.0 g/dL    RDW 14.6 11.9 - 14.6 %    PLATELET 377 808 - 088 K/uL    MPV 9.8 (L) 10.8 - 14.1 FL    DF AUTOMATED      NEUTROPHILS 64 43 - 78 %    LYMPHOCYTES 20 13 - 44 %    MONOCYTES 12 4.0 - 12.0 %    EOSINOPHILS 2 0.5 - 7.8 %    BASOPHILS 1 0.0 - 2.0 %    IMMATURE GRANULOCYTES 1 0.0 - 5.0 %    ABS. NEUTROPHILS 5.0 1.7 - 8.2 K/UL    ABS. LYMPHOCYTES 1.5 0.5 - 4.6 K/UL    ABS. MONOCYTES 0.9 0.1 - 1.3 K/UL    ABS. EOSINOPHILS 0.1 0.0 - 0.8 K/UL    ABS. BASOPHILS 0.0 0.0 - 0.2 K/UL    ABS. IMM. GRANS. 0.0 0.0 - 0.5 K/UL   METABOLIC PANEL, COMPREHENSIVE    Collection Time: 02/06/18  1:17 PM   Result Value Ref Range    Sodium 139 136 - 145 mmol/L    Potassium 5.0 3.5 - 5.1 mmol/L    Chloride 109 (H) 98 - 107 mmol/L    CO2 21 21 - 32 mmol/L    Anion gap 9 7 - 16 mmol/L    Glucose 100 65 - 100 mg/dL    BUN 39 (H) 8 - 23 MG/DL    Creatinine 1.04 (H) 0.6 - 1.0 MG/DL    GFR est AA >60 >60 ml/min/1.73m2    GFR est non-AA 53 (L) >60 ml/min/1.73m2    Calcium 10.3 8.3 - 10.4 MG/DL    Bilirubin, total 0.3 0.2 - 1.1 MG/DL    ALT (SGPT) 18 12 - 65 U/L    AST (SGOT) 26 15 - 37 U/L    Alk.  phosphatase 86 50 - 136 U/L    Protein, total 7.2 6.3 - 8.2 g/dL    Albumin 3.8 3.2 - 4.6 g/dL    Globulin 3.4 2.3 - 3.5 g/dL    A-G Ratio 1.1 (L) 1.2 - 3.5     TROPONIN I    Collection Time: 02/06/18  1:17 PM   Result Value Ref Range    Troponin-I, Qt. <0.02 (L) 0.02 - 0.05 NG/ML   MAGNESIUM    Collection Time: 02/06/18  1:17 PM   Result Value Ref Range    Magnesium 2.5 (H) 1.8 - 2.4 mg/dL   BNP    Collection Time: 02/06/18  1:17 PM   Result Value Ref Range     pg/mL   POC TROPONIN-I    Collection Time: 02/06/18  3:10 PM   Result Value Ref Range    Troponin-I (POC) 0 (L) 0.02 - 0.05 ng/ml       CXR: small B effusions and interstitial inflammation suggesting fluid overload    Assessment/Plan:   Accelerated hypertension- Pt with elevated BP possibly secondary to volume overload, will cont toprol and assess BP response to diuresis, titrate BP meds as needed. Admit to tele, cont IV lasix, monitor I&O, daily weight, electrolytes. Chronic diastolic heart failure- EF 60-65% with mild-mod MR, lasix changed to prn recently, on low NA diet, will admit, IV lasix for diuresis. CAD- S/P CABG 2011:  LIMA-LAD, SVG-OM, SVG-PDA- unclear how much chest pain she is having- will cont ASA and plavix, nitro prn, trend troponin. Paroxysmal atrial fibrillation- Cont po amiodarone, monitor on tele. Left bundle branch block- New, trend troponin, cont ASA, plavix, statin, nitro prn. Assess response to diuresis. Sandy Vaughn PA-C  2/6/2018  4:06 PM             [facility] CARDIOLOGY     2/6/2018     5:51 PM    I have personally seen and examined Yumiko Toledo with Minh BELL. I agree and confirm findings in history, physical exam, and assessment/plan as outlined above with following pertinent additions/exceptions:   Patient with known history of coronary artery disease. Her last catheter was in July 2017. She also has a history of diastolic heart failure and recently had her Lasix discontinued by her primary care physician. She was seen at her assisted living facility earlier today feeling poorly.   She is noted to be significantly hypertensive with a blood pressure 198/78. She has had recurrent chest discomfort. EKG shows a new left bundle branch block. Cardiac enzymes are negative. Chest x-ray suggestive of volume overload. BNP is 687. PE: CV: RRR  L: Bibasilar rales. E: trivial edema. ASS/Plan: As above. IV lasix. Monitor BP and if uncontrolled despite diuresis will adjust regimen. Follow cardiac enzymes but no plans at present for repeat ischemia evaluation.        Qamar Howard MD'

## 2018-02-06 NOTE — IP AVS SNAPSHOT
[address] 
[phone] Patient: Mo Orozco MRN: [id number] PHE:[birth year] A check shira indicates which time of day the medication should be taken. My Medications START taking these medications Instructions Each Dose to Equal  
 Morning Noon Evening Bedtime  
 nitroglycerin 0.4 mg SL tablet Commonly known as:  NITROSTAT Your next dose is:  As needed 1 Tab by SubLINGual route every five (5) minutes as needed for Chest Pain. 0.4 mg  
    
   
   
   
  
  
CHANGE how you take these medications Instructions Each Dose to Equal  
 Morning Noon Evening Bedtime  
 furosemide 20 mg tablet Commonly known as:  LASIX What changed:   
- medication strength 
- how much to take Your next dose is:  2-9-2018 Take 1 Tab by mouth daily. 20 mg CONTINUE taking these medications Instructions Each Dose to Equal  
 Morning Noon Evening Bedtime  
 amiodarone 200 mg tablet Commonly known as:  CORDARONE Your next dose is:  2-9-2018 Take 0.5 Tabs by mouth daily. 100 mg ARTIFICIAL TEARS([id number]) ophthalmic solution Generic drug:  dextran 70-hypromellose Your next dose is:  As neeed Administer 2 drops to both eyes as needed. 2 Drop  
    
   
   
   
  
 aspirin 81 mg chewable tablet Your next dose is:  2-9-2018 Take 81 mg by mouth daily. 81 mg CALTRATE 600+D PLUS MINERALS 600 mg calcium- 400 unit Tab Generic drug:  Calcium Carbonate-Vit D3-Min Take  by mouth. celecoxib 100 mg capsule Commonly known as:  CELEBREX Your next dose is:  2-8-2018 Take  by mouth two (2) times a day. clopidogrel 75 mg Tab Commonly known as:  PLAVIX Your next dose is:  2-9-2018 Take 1 Tab by mouth daily. 75 mg CRESTOR 40 mg tablet Generic drug:  rosuvastatin Your next dose is:  2-8-2018 Take 40 mg by mouth nightly. 40 mg DULoxetine 20 mg capsule Commonly known as:  CYMBALTA Your next dose is:  2-9-2018 Take 20 mg by mouth daily. 20 mg  
    
  
   
   
   
  
 ferrous sulfate 142 mg (45 mg iron) ER tablet Commonly known as:  SLOW FE  
Your next dose is:  As taking at home Take  by mouth three (3) times daily (with meals). gemfibrozil 600 mg tablet Commonly known as:  LOPID Your next dose is:  2-8-2018 Take 600 mg by mouth two (2) times a day. 600 mg HYDROcodone-acetaminophen 5-325 mg per tablet Commonly known as:  Brittany Chain Your next dose is:  As neded Take 1 Tab by mouth two (2) times a day. 1 Tab  
    
   
   
   
  
 metoprolol succinate 25 mg XL tablet Commonly known as:  TOPROL-XL Your next dose is:  2-9-2018 Take  by mouth daily. pantoprazole 40 mg tablet Commonly known as:  PROTONIX Your next dose is:  2-9-2018 Take 1 Tab by mouth Daily (before breakfast). 40 mg  
    
  
   
   
   
  
 REQUIP 1 mg tablet Generic drug:  rOPINIRole Your next dose is:  As taking at home Take 1 mg by mouth three (3) times daily. 12pm - 3pm -9pm  
 1 mg SYNTHROID 137 mcg tablet Generic drug:  levothyroxine Your next dose is:  2-9-2018 Take  by mouth Daily (before breakfast). timolol maleate 0.5 % Drpd ophthalmic solution Administer 1 drop to both eyes daily. 9 am  
 1 Drop VITAMIN C 500 mg tablet Generic drug:  ascorbic acid (vitamin C) Take 500 mg by mouth daily. 500 mg  
    
   
   
   
  
 XALATAN 0.005 % ophthalmic solution Generic drug:  latanoprost  
Your next dose is:  2-8-2018 Administer 1 Drop to both eyes nightly. 1 Drop Where to Get Your Medications Information on where to get these meds will be given to you by the nurse or doctor. ! Ask your nurse or doctor about these medications  
  furosemide 20 mg tablet  
 nitroglycerin 0.4 mg SL tablet

## 2018-02-06 NOTE — ED TRIAGE NOTES
C/o weakness x couple weeks with progressive worsening over last couple days. Per ems pt stated felt as \"if going to pass out\". Admits to abdominal pain radiating into chest. Denies n/v/d. Denies fever or chills. Denies cough. Admits to shortness of breath. Per ems pt with first degree heart block enroute. bgl 114. Pt admits to staying well hydrated. Pt from [address] [place] [address]. Pt ambulatory with walker. Per family pt taken off lasix approx 1 week ago.

## 2018-02-07 ENCOUNTER — APPOINTMENT (OUTPATIENT)
Dept: ULTRASOUND IMAGING | Age: 83
End: 2018-02-07
Attending: INTERNAL MEDICINE
Payer: MEDICARE

## 2018-02-07 LAB
ANION GAP SERPL CALC-SCNC: 11 MMOL/L (ref 7–16)
BUN SERPL-MCNC: 46 MG/DL (ref 8–23)
CALCIUM SERPL-MCNC: 9.8 MG/DL (ref 8.3–10.4)
CHLORIDE SERPL-SCNC: 105 MMOL/L (ref 98–107)
CO2 SERPL-SCNC: 24 MMOL/L (ref 21–32)
CREAT SERPL-MCNC: 1.24 MG/DL (ref 0.6–1)
GLUCOSE SERPL-MCNC: 85 MG/DL (ref 65–100)
MAGNESIUM SERPL-MCNC: 2.4 MG/DL (ref 1.8–2.4)
POTASSIUM SERPL-SCNC: 3.8 MMOL/L (ref 3.5–5.1)
SODIUM SERPL-SCNC: 140 MMOL/L (ref 136–145)
TROPONIN I SERPL-MCNC: <0.02 NG/ML (ref 0.02–0.05)
TROPONIN I SERPL-MCNC: <0.02 NG/ML (ref 0.02–0.05)

## 2018-02-07 PROCEDURE — 36415 COLL VENOUS BLD VENIPUNCTURE: CPT | Performed by: PHYSICIAN ASSISTANT

## 2018-02-07 PROCEDURE — 74011250636 HC RX REV CODE- 250/636: Performed by: PHYSICIAN ASSISTANT

## 2018-02-07 PROCEDURE — 74011250637 HC RX REV CODE- 250/637: Performed by: PHYSICIAN ASSISTANT

## 2018-02-07 PROCEDURE — 84484 ASSAY OF TROPONIN QUANT: CPT | Performed by: PHYSICIAN ASSISTANT

## 2018-02-07 PROCEDURE — 74011250637 HC RX REV CODE- 250/637: Performed by: INTERNAL MEDICINE

## 2018-02-07 PROCEDURE — 99218 HC RM OBSERVATION: CPT

## 2018-02-07 PROCEDURE — 74011000250 HC RX REV CODE- 250: Performed by: PHYSICIAN ASSISTANT

## 2018-02-07 PROCEDURE — 96376 TX/PRO/DX INJ SAME DRUG ADON: CPT

## 2018-02-07 PROCEDURE — 83735 ASSAY OF MAGNESIUM: CPT | Performed by: PHYSICIAN ASSISTANT

## 2018-02-07 PROCEDURE — 80048 BASIC METABOLIC PNL TOTAL CA: CPT | Performed by: PHYSICIAN ASSISTANT

## 2018-02-07 PROCEDURE — 93975 VASCULAR STUDY: CPT

## 2018-02-07 RX ORDER — FUROSEMIDE 20 MG/1
20 TABLET ORAL DAILY
Status: DISCONTINUED | OUTPATIENT
Start: 2018-02-07 | End: 2018-02-08 | Stop reason: HOSPADM

## 2018-02-07 RX ADMIN — FERROUS SULFATE TAB 325 MG (65 MG ELEMENTAL FE) 325 MG: 325 (65 FE) TAB at 09:38

## 2018-02-07 RX ADMIN — ROPINIROLE HYDROCHLORIDE 1 MG: 0.5 TABLET, FILM COATED ORAL at 17:43

## 2018-02-07 RX ADMIN — DULOXETINE HYDROCHLORIDE 20 MG: 20 CAPSULE, DELAYED RELEASE ORAL at 09:31

## 2018-02-07 RX ADMIN — ROSUVASTATIN CALCIUM 40 MG: 20 TABLET, FILM COATED ORAL at 22:08

## 2018-02-07 RX ADMIN — PANTOPRAZOLE SODIUM 40 MG: 40 TABLET, DELAYED RELEASE ORAL at 06:37

## 2018-02-07 RX ADMIN — HYDROCODONE BITARTRATE AND ACETAMINOPHEN 1 TABLET: 5; 325 TABLET ORAL at 09:30

## 2018-02-07 RX ADMIN — HYDROCODONE BITARTRATE AND ACETAMINOPHEN 1 TABLET: 5; 325 TABLET ORAL at 22:08

## 2018-02-07 RX ADMIN — LATANOPROST 1 DROP: 50 SOLUTION OPHTHALMIC at 22:14

## 2018-02-07 RX ADMIN — ASPIRIN 81 MG 81 MG: 81 TABLET ORAL at 09:29

## 2018-02-07 RX ADMIN — GEMFIBROZIL 600 MG: 600 TABLET ORAL at 17:43

## 2018-02-07 RX ADMIN — LEVOTHYROXINE SODIUM 137 MCG: 112 TABLET ORAL at 06:37

## 2018-02-07 RX ADMIN — FUROSEMIDE 20 MG: 20 TABLET ORAL at 09:39

## 2018-02-07 RX ADMIN — GEMFIBROZIL 600 MG: 600 TABLET ORAL at 09:31

## 2018-02-07 RX ADMIN — ROPINIROLE HYDROCHLORIDE 1 MG: 0.5 TABLET, FILM COATED ORAL at 09:29

## 2018-02-07 RX ADMIN — TIMOLOL MALEATE 1 DROP: 5 SOLUTION OPHTHALMIC at 10:12

## 2018-02-07 RX ADMIN — FUROSEMIDE 40 MG: 10 INJECTION, SOLUTION INTRAMUSCULAR; INTRAVENOUS at 00:55

## 2018-02-07 RX ADMIN — CLOPIDOGREL BISULFATE 75 MG: 75 TABLET ORAL at 09:36

## 2018-02-07 RX ADMIN — FERROUS SULFATE TAB 325 MG (65 MG ELEMENTAL FE) 325 MG: 325 (65 FE) TAB at 17:42

## 2018-02-07 RX ADMIN — CELECOXIB 100 MG: 100 CAPSULE ORAL at 17:43

## 2018-02-07 RX ADMIN — CELECOXIB 100 MG: 100 CAPSULE ORAL at 09:30

## 2018-02-07 RX ADMIN — AMIODARONE HYDROCHLORIDE 100 MG: 200 TABLET ORAL at 09:37

## 2018-02-07 RX ADMIN — ROPINIROLE HYDROCHLORIDE 1 MG: 0.5 TABLET, FILM COATED ORAL at 22:08

## 2018-02-07 NOTE — PROGRESS NOTES
made initial visit. Pt was alert and verbal.  Pt was sitting up in chair with family in front of her. No pain level was expressed or observed.  welcomed pt and family to [address] [place] [address] and shared about  services.  provided spiritual care through presence, pastoral conversation, and assurance of prayer.

## 2018-02-07 NOTE — ED NOTES
TRANSFER - OUT REPORT:    Verbal report given to MELITA Marks on Rosina Ortiz  being transferred to [facility] for routine progression of care       Report consisted of patients Situation, Background, Assessment and   Recommendations(SBAR). Information from the following report(s) SBAR, Kardex, ED Summary, [facility] and Recent Results was reviewed with the receiving nurse. Lines:   Peripheral IV 02/06/18 Right Antecubital (Active)   Site Assessment Clean, dry, & intact 2/6/2018  1:18 PM   Phlebitis Assessment 0 2/6/2018  1:18 PM   Infiltration Assessment 0 2/6/2018  1:18 PM   Dressing Status Clean, dry, & intact 2/6/2018  1:18 PM   Dressing Type Transparent 2/6/2018  1:18 PM   Hub Color/Line Status Red 2/6/2018  1:18 PM        Opportunity for questions and clarification was provided.       Patient transported with:   Monitor  O2 @ 2 liters  Patient-specific medications from Pharmacy  Registered Nurse

## 2018-02-07 NOTE — PROGRESS NOTES
TRANSFER - OUT REPORT:    Verbal report given to German Dobbins NP(name) on [facility]  being transferred to telemetry(unit) for routine progression of care       Report consisted of patients Situation, Background, Assessment and   Recommendations(SBAR). Information from the following report(s) SBAR, Procedure Summary, Intake/Output, MAR, Med Rec Status and Cardiac Rhythm Sinus arcenio was reviewed with the receiving nurse. Lines:   Peripheral IV 02/06/18 Right Antecubital (Active)   Site Assessment Clean, dry, & intact 2/7/2018  2:50 PM   Phlebitis Assessment 0 2/7/2018  2:50 PM   Infiltration Assessment 0 2/7/2018  2:50 PM   Dressing Status Clean, dry, & intact 2/7/2018  2:50 PM   Dressing Type Tape;Transparent 2/7/2018  2:50 PM   Hub Color/Line Status Capped 2/7/2018  3:00 AM        Opportunity for questions and clarification was provided.       Patient transported with:   Monitor

## 2018-02-07 NOTE — ED PROVIDER NOTES
HPI Comments: Patient states she began to feel fatigued with hurting all over while reading the paper this morning. She states she had SOB and chest pain all night intermittently. Has some nausea, no vomiting. Denies fever. States she coughs at times. She has some dementia so history is not deemed totally reliable. Daughter reports admission late December for CHF. Had follow up with Dr. Simone Multani on 1/8. Amiodarone was decreased from 200 mg to 100 mg. Has history of paroxysmal A Fib. Seen at Urgent Care 1/16 for sore throat. Treated with Amoxicillin for 10 days. Seen by Dr. Vani Vazquez 1/23 and was doing well. Stopped Lasix - advised to take prn. She has been weighing herself and has noted no changes. Appetite is good. She now states she is having some chest pain currently. Patient is a 80 y.o. female presenting with fatigue. The history is provided by the patient, a relative and medical records (daughter and son in law). Fatigue   This is a new problem. The current episode started 1 to 2 hours ago. The problem has not changed since onset. There was no focality noted. There has been no fever. Associated symptoms include shortness of breath, chest pain, confusion and nausea. Pertinent negatives include no vomiting and no altered mental status.         Past Medical History:   Diagnosis Date    Anemia associated with acute blood loss 7/24/2011    Atrial fibrillation ([address] 7/13/2011    CAD (coronary artery disease) 1997    MI, PCI , CABG 7/13/2011    Chronic diastolic heart failure ([address] 10/29/2015    Chronic pain     Constipation- no BM since 7/30 8/5/2011    Coronary atherosclerosis of native coronary artery--PTCA 1997 LAD/diag 7/1/2011    CVA (cerebrovascular accident) ([address] 3/29/2009    mild memory issues, rls, afib    Diastolic CHF, acute on chronic ([address] 12/25/2017    GI bleed 1/11/2015    HEMOTHORAX ON LEFT  7/24/2011    Hyperlipemia 3/29/2009    Hypertension 3/29/2009    Hypertension, benign 10/29/2015    Hyponatremia 7/24/2011    Hypothyroidism 7/7/2011    Leucocytosis 8/3/2011    Leukocytosis 1/11/2015    Irene-Starr tear 1/12/2015    Mitral valve insufficiency 4/2/2009    Paroxysmal atrial fibrillation (HCC) 10/29/2015    Pleural effusion, bilateral 7/23/2011    Right thorocetesis 1200ml removed 7/23/11 and 1000ml removed 7/28/11 Left thorocentesis 1100ml removed 7/23/11     S/p thoracotomy- evauation of hemothorax 8/4/2011    Unspecified hypothyroidism 3/30/2009       Past Surgical History:   Procedure Laterality Date    CABG, ARTERY-VEIN, THREE  7/7/2011    HX BACK SURGERY      HX ORTHOPAEDIC  4 years ago    neck    HX ORTHOPAEDIC  10 years    back    HX OTHER SURGICAL      steroid injection neck, 2 weeks ago         Family History:   Problem Relation Age of Onset    Heart Attack Father 36       Social History     Social History    Marital status:      Spouse name: N/A    Number of children: N/A    Years of education: N/A     Occupational History    Not on file. Social History Main Topics    Smoking status: Former Smoker     Quit date: 1951    Smokeless tobacco: Never Used    Alcohol use No    Drug use: No    Sexual activity: No     Other Topics Concern    Not on file     Social History Narrative         ALLERGIES: Versed [midazolam]    Review of Systems   Constitutional: Positive for fatigue. Negative for appetite change, chills and fever. HENT: Negative. Respiratory: Positive for shortness of breath. Negative for chest tightness. Cardiovascular: Positive for chest pain. Negative for palpitations and leg swelling. Gastrointestinal: Positive for nausea. Negative for abdominal pain, diarrhea and vomiting. Genitourinary: Positive for frequency. Negative for dysuria. Musculoskeletal: Negative. Skin: Negative. Neurological: Negative for syncope. Short term memory loss   Hematological: Negative.     Psychiatric/Behavioral: Positive for confusion. Vitals:    02/07/18 0932 02/07/18 0937 02/07/18 0939 02/07/18 1122   BP: 96/55 96/55 96/55 123/58   Pulse: 60 (!) 55 (!) 59 (!) 46   Resp:    18   Temp:    97 °F (36.1 °C)   SpO2:    96%   Weight:       Height:                Physical Exam   Constitutional: She is oriented to person, place, and time. She appears well-developed and well-nourished. HENT:   Head: Normocephalic and atraumatic. Mouth/Throat: Oropharynx is clear and moist.   Eyes: Conjunctivae and EOM are normal. Pupils are equal, round, and reactive to light. No scleral icterus. Neck: Normal range of motion. Neck supple. JVD present. Cardiovascular: Normal rate, regular rhythm, normal heart sounds and intact distal pulses. Pulmonary/Chest: Effort normal. She has rales. Abdominal: Soft. Bowel sounds are normal. She exhibits no mass. There is no tenderness. Musculoskeletal: Normal range of motion. She exhibits no edema or tenderness. Neurological: She is alert and oriented to person, place, and time. Skin: Skin is warm and dry. Psychiatric: She has a normal mood and affect. Her behavior is normal.   Nursing note and vitals reviewed.        MDM  Number of Diagnoses or Management Options     Amount and/or Complexity of Data Reviewed  Clinical lab tests: ordered and reviewed  Tests in the radiology section of CPT®: ordered and reviewed  Decide to obtain previous medical records or to obtain history from someone other than the patient: yes  Obtain history from someone other than the patient: yes  Review and summarize past medical records: yes  Discuss the patient with other providers: yes  Independent visualization of images, tracings, or specimens: yes    Critical Care  Total time providing critical care: 30-74 minutes (Critical care time 35 minutes)    Patient Progress  Patient progress: stable        ED Course       Procedures      Acute exacerbation CHF  CXR with pleural effusion, CHF  EKG with LBBB which is new since December 2017  Labs reviewed  Lasix 40 mg IV, Nitroglycerin 1 inch to chest, Aspirin 325 mg po  Consult Cardiology - Dr. Garima Carver - they will see

## 2018-02-07 NOTE — PROGRESS NOTES
Bedside and Verbal shift change report given to Charles Lund (oncoming nurse) by Anay Marcial, RN (offgoing nurse).

## 2018-02-07 NOTE — PROGRESS NOTES
TRANSFER - IN REPORT:    Verbal report received from Yessi Gupta RN(name) on Adolphus Duverney  being received from ER(unit) for routine progression of care      Report consisted of patients Situation, Background, Assessment and   Recommendations(SBAR). Information from the following report(s) SBAR, Kardex and Recent Results was reviewed with the receiving nurse. Opportunity for questions and clarification was provided. Assessment completed upon patients arrival to unit and care assumed. Skin Assessment done with second RN, no skin break down noted. Scattered discolorations noted on arms. No redness noted on sacrum.

## 2018-02-07 NOTE — PROGRESS NOTES
2/7/2018 8:47 AM    Admit Date: 2/6/2018        Subjective:     Jeet Suarez reports feeling much better No more SOB flat in bed Her BP has dropped down even low last night Was very high on admission. She had this in Dec andf had mild CHF Repeat CXR in Jan was clear . NO c/o CP Had a neg doppler of legs in Dec        Objective:      Visit Vitals    /53    Pulse (!) 56    Temp 97.2 °F (36.2 °C)    Resp 16    Ht 5' (1.524 m)    Wt 58.6 kg (129 lb 1.6 oz)    SpO2 99%    BMI 25.21 kg/m2       Physical Exam:  Heart: regular rate and rhythm  Lungs: clear to auscultation bilaterally  Abdomen: soft, non-tender.  Bowel sounds normal. No masses,  no organomegaly  Extremities: no edema    Data Review:   Labs:    Recent Results (from the past 24 hour(s))   EKG, 12 LEAD, INITIAL    Collection Time: 02/06/18  1:07 PM   Result Value Ref Range    Ventricular Rate 53 BPM    Atrial Rate 53 BPM    P-R Interval 278 ms    QRS Duration 128 ms    Q-T Interval 462 ms    QTC Calculation (Bezet) 433 ms    Calculated P Axis 88 degrees    Calculated R Axis -49 degrees    Calculated T Axis 87 degrees    Diagnosis       Sinus bradycardia with 1st degree A-V block  Left axis deviation  Left bundle branch block  Abnormal ECG  When compared with ECG of 26-DEC-2017 23:18,  Left bundle branch block is now Present  Criteria for Septal infarct are no longer Present  Confirmed by Robbie Mcgee MD (), ALDO LAZAR ([id number]) on 2/6/2018 2:09:32 PM     CBC WITH AUTOMATED DIFF    Collection Time: 02/06/18  1:17 PM   Result Value Ref Range    WBC 7.7 4.3 - 11.1 K/uL    RBC 3.00 (L) 4.05 - 5.25 M/uL    HGB 9.7 (L) 11.7 - 15.4 g/dL    HCT 30.0 (L) 35.8 - 46.3 %    .0 (H) 79.6 - 97.8 FL    MCH 32.3 26.1 - 32.9 PG    MCHC 32.3 31.4 - 35.0 g/dL    RDW 14.6 11.9 - 14.6 %    PLATELET 151 642 - 552 K/uL    MPV 9.8 (L) 10.8 - 14.1 FL    DF AUTOMATED      NEUTROPHILS 64 43 - 78 %    LYMPHOCYTES 20 13 - 44 %    MONOCYTES 12 4.0 - 12.0 % EOSINOPHILS 2 0.5 - 7.8 %    BASOPHILS 1 0.0 - 2.0 %    IMMATURE GRANULOCYTES 1 0.0 - 5.0 %    ABS. NEUTROPHILS 5.0 1.7 - 8.2 K/UL    ABS. LYMPHOCYTES 1.5 0.5 - 4.6 K/UL    ABS. MONOCYTES 0.9 0.1 - 1.3 K/UL    ABS. EOSINOPHILS 0.1 0.0 - 0.8 K/UL    ABS. BASOPHILS 0.0 0.0 - 0.2 K/UL    ABS. IMM. GRANS. 0.0 0.0 - 0.5 K/UL   METABOLIC PANEL, COMPREHENSIVE    Collection Time: 02/06/18  1:17 PM   Result Value Ref Range    Sodium 139 136 - 145 mmol/L    Potassium 5.0 3.5 - 5.1 mmol/L    Chloride 109 (H) 98 - 107 mmol/L    CO2 21 21 - 32 mmol/L    Anion gap 9 7 - 16 mmol/L    Glucose 100 65 - 100 mg/dL    BUN 39 (H) 8 - 23 MG/DL    Creatinine 1.04 (H) 0.6 - 1.0 MG/DL    GFR est AA >60 >60 ml/min/1.73m2    GFR est non-AA 53 (L) >60 ml/min/1.73m2    Calcium 10.3 8.3 - 10.4 MG/DL    Bilirubin, total 0.3 0.2 - 1.1 MG/DL    ALT (SGPT) 18 12 - 65 U/L    AST (SGOT) 26 15 - 37 U/L    Alk.  phosphatase 86 50 - 136 U/L    Protein, total 7.2 6.3 - 8.2 g/dL    Albumin 3.8 3.2 - 4.6 g/dL    Globulin 3.4 2.3 - 3.5 g/dL    A-G Ratio 1.1 (L) 1.2 - 3.5     TROPONIN I    Collection Time: 02/06/18  1:17 PM   Result Value Ref Range    Troponin-I, Qt. <0.02 (L) 0.02 - 0.05 NG/ML   MAGNESIUM    Collection Time: 02/06/18  1:17 PM   Result Value Ref Range    Magnesium 2.5 (H) 1.8 - 2.4 mg/dL   BNP    Collection Time: 02/06/18  1:17 PM   Result Value Ref Range     pg/mL   POC TROPONIN-I    Collection Time: 02/06/18  3:10 PM   Result Value Ref Range    Troponin-I (POC) 0 (L) 0.02 - 0.05 ng/ml   TROPONIN I    Collection Time: 02/06/18 10:27 PM   Result Value Ref Range    Troponin-I, Qt. <0.02 (L) 0.02 - 3.98 NG/ML   METABOLIC PANEL, BASIC    Collection Time: 02/07/18  5:56 AM   Result Value Ref Range    Sodium 140 136 - 145 mmol/L    Potassium 3.8 3.5 - 5.1 mmol/L    Chloride 105 98 - 107 mmol/L    CO2 24 21 - 32 mmol/L    Anion gap 11 7 - 16 mmol/L    Glucose 85 65 - 100 mg/dL    BUN 46 (H) 8 - 23 MG/DL    Creatinine 1.24 (H) 0.6 - 1.0 MG/DL GFR est AA 53 (L) >60 ml/min/1.73m2    GFR est non-AA 44 (L) >60 ml/min/1.73m2    Calcium 9.8 8.3 - 10.4 MG/DL   MAGNESIUM    Collection Time: 02/07/18  5:56 AM   Result Value Ref Range    Magnesium 2.4 1.8 - 2.4 mg/dL       Telemetry: sinus arcenio      Assessment:     Patient Active Problem List    Diagnosis Date Noted    Accelerated hypertension Now lower Will check renal artery US to look for CAT stop IV lasix and change to PO 02/06/2018    Left bundle branch block new 50/54/3652    Diastolic CHF, chronic ([facility]) better with drop in BP 02/06/2018    Acute respiratory failure with hypoxia ([facility]) 26/95/0815    Diastolic CHF, acute on chronic ([address] 12/25/2017    Acute kidney failure ([address] 12/25/2017    Acute heart failure ([address] 12/25/2017    Localized edema stable 08/29/2017    Unstable angina (HCC) 07/07/2017    S/P CABG (coronary artery bypass graft) 09/14/2016    Paroxysmal atrial fibrillation (HCC) in sinus reduce toprol on lower dose amio 10/29/2015    Irene-Starr tear 01/12/2015    GI bleed 01/11/2015    Leukocytosis 01/11/2015    Constipation- no BM since 7/30 08/05/2011    CAD (coronary artery disease) 08/04/2011    S/p thoracotomy- evauation of hemothorax 08/04/2011    Leucocytosis 08/03/2011    Anemia associated with acute blood loss 07/24/2011    Hyponatremia 07/24/2011    HEMOTHORAX ON LEFT  07/24/2011    Pleural effusion, bilateral 07/23/2011    Atrial fibrillation ([address] 07/13/2011    Hypothyroidism 07/07/2011    Coronary atherosclerosis of native coronary artery--PTCA 1997 LAD/diag 07/01/2011    Mitral valve insufficiency uncahnged med rx 04/02/2009    Unspecified hypothyroidism 03/30/2009    Hypertension 03/29/2009    Hyperlipemia 03/29/2009       Plan:   Change to Po lasix renal US

## 2018-02-07 NOTE — PROGRESS NOTES
CM met with patient with son in law at bedside to discuss admission status. Medicare Outpatient Observation Notice provided to the patient. Oral explanation was provided and all questions answered. Patient refused to sign document. CM signed document placed it in the medical chart. Copy provided to patient.

## 2018-02-07 NOTE — PROGRESS NOTES
Problem: Falls - Risk of  Goal: *Absence of Falls  Document Leann Fall Risk and appropriate interventions in the flowsheet.    Outcome: Progressing Towards Goal  Fall Risk Interventions:            Medication Interventions: Bed/chair exit alarm, Patient to call before getting OOB, Teach patient to arise slowly

## 2018-02-07 NOTE — ED NOTES
Ice chips given to UBALDO Lassiter. Lights dimmed and assisted to position of most comfort as requested by pt.

## 2018-02-08 VITALS
OXYGEN SATURATION: 96 % | SYSTOLIC BLOOD PRESSURE: 120 MMHG | RESPIRATION RATE: 17 BRPM | DIASTOLIC BLOOD PRESSURE: 60 MMHG | WEIGHT: 120.4 LBS | BODY MASS INDEX: 23.64 KG/M2 | HEART RATE: 65 BPM | TEMPERATURE: 97 F | HEIGHT: 60 IN

## 2018-02-08 LAB
ANION GAP SERPL CALC-SCNC: 12 MMOL/L (ref 7–16)
BUN SERPL-MCNC: 54 MG/DL (ref 8–23)
CALCIUM SERPL-MCNC: 8.8 MG/DL (ref 8.3–10.4)
CHLORIDE SERPL-SCNC: 103 MMOL/L (ref 98–107)
CO2 SERPL-SCNC: 24 MMOL/L (ref 21–32)
CREAT SERPL-MCNC: 1.65 MG/DL (ref 0.6–1)
GLUCOSE SERPL-MCNC: 96 MG/DL (ref 65–100)
MAGNESIUM SERPL-MCNC: 2.5 MG/DL (ref 1.8–2.4)
POTASSIUM SERPL-SCNC: 3.9 MMOL/L (ref 3.5–5.1)
SODIUM SERPL-SCNC: 139 MMOL/L (ref 136–145)

## 2018-02-08 PROCEDURE — 83735 ASSAY OF MAGNESIUM: CPT | Performed by: PHYSICIAN ASSISTANT

## 2018-02-08 PROCEDURE — 36415 COLL VENOUS BLD VENIPUNCTURE: CPT | Performed by: PHYSICIAN ASSISTANT

## 2018-02-08 PROCEDURE — 99218 HC RM OBSERVATION: CPT

## 2018-02-08 PROCEDURE — 74011250637 HC RX REV CODE- 250/637: Performed by: INTERNAL MEDICINE

## 2018-02-08 PROCEDURE — 74011250637 HC RX REV CODE- 250/637: Performed by: PHYSICIAN ASSISTANT

## 2018-02-08 PROCEDURE — 80048 BASIC METABOLIC PNL TOTAL CA: CPT | Performed by: PHYSICIAN ASSISTANT

## 2018-02-08 RX ORDER — NITROGLYCERIN 0.4 MG/1
0.4 TABLET SUBLINGUAL
Qty: 1 BOTTLE | Refills: 5 | Status: SHIPPED | OUTPATIENT
Start: 2018-02-08 | End: 2021-09-03 | Stop reason: SDUPTHER

## 2018-02-08 RX ORDER — FUROSEMIDE 20 MG/1
20 TABLET ORAL DAILY
Qty: 30 TAB | Refills: 11 | Status: SHIPPED | OUTPATIENT
Start: 2018-02-08 | End: 2018-04-30 | Stop reason: SDUPTHER

## 2018-02-08 RX ADMIN — LEVOTHYROXINE SODIUM 137 MCG: 112 TABLET ORAL at 08:10

## 2018-02-08 RX ADMIN — METOPROLOL SUCCINATE 25 MG: 25 TABLET, EXTENDED RELEASE ORAL at 08:10

## 2018-02-08 RX ADMIN — CLOPIDOGREL BISULFATE 75 MG: 75 TABLET ORAL at 08:10

## 2018-02-08 RX ADMIN — ROPINIROLE HYDROCHLORIDE 1 MG: 0.5 TABLET, FILM COATED ORAL at 08:09

## 2018-02-08 RX ADMIN — ASPIRIN 81 MG 81 MG: 81 TABLET ORAL at 08:10

## 2018-02-08 RX ADMIN — PANTOPRAZOLE SODIUM 40 MG: 40 TABLET, DELAYED RELEASE ORAL at 08:10

## 2018-02-08 RX ADMIN — CELECOXIB 100 MG: 100 CAPSULE ORAL at 08:09

## 2018-02-08 RX ADMIN — HYDROCODONE BITARTRATE AND ACETAMINOPHEN 1 TABLET: 5; 325 TABLET ORAL at 08:11

## 2018-02-08 RX ADMIN — AMIODARONE HYDROCHLORIDE 100 MG: 200 TABLET ORAL at 08:11

## 2018-02-08 RX ADMIN — FUROSEMIDE 20 MG: 20 TABLET ORAL at 08:09

## 2018-02-08 RX ADMIN — FERROUS SULFATE TAB 325 MG (65 MG ELEMENTAL FE) 325 MG: 325 (65 FE) TAB at 08:10

## 2018-02-08 RX ADMIN — TIMOLOL MALEATE 1 DROP: 5 SOLUTION OPHTHALMIC at 08:18

## 2018-02-08 RX ADMIN — DULOXETINE HYDROCHLORIDE 20 MG: 20 CAPSULE, DELAYED RELEASE ORAL at 08:10

## 2018-02-08 RX ADMIN — GEMFIBROZIL 600 MG: 600 TABLET ORAL at 08:11

## 2018-02-08 NOTE — PROGRESS NOTES
Discharge instructions reviewed with patient. Prescriptions given for lasix and nitroglycerin and med info sheets provided for all new medications. Opportunity for questions provided. patient voiced understanding of all discharge instructions. IVs removed and monitor off by primary RN.

## 2018-02-08 NOTE — PHYSICIAN ADVISORY
Letter of Determination: Upgrade from Observation to Inpatient Status    This patient was originally hospitalized as observation status on 2/6/2018 for hypertensive urgency. This patient now meets for Inpatient Admission in accordance with CMS regulation Section 43 .3. Specifically, patient's stay is now over Two Midnights and was medically necessary. The patient's stay was medically necessary based on history of coronary artery disease, with vital signs significant for blood pressures fluctuating from 207/87 to 90/50 mmHg, pulse to 49 beats per minute. Consistent with CMS guidelines, patient meets for inpatient status. It is our recommendation that this patient's hospitalization status should be upgraded from OBSERVATION to INPATIENT status.      The final decision regarding the patient's hospitalization status depends on the attending physician's judgement.     Jairo Ta MD, SHERIE,   Physician Garret Phelan.

## 2018-02-08 NOTE — DISCHARGE INSTRUCTIONS
Learning About Fluid Overload  What is fluid overload? Fluid overload means that your body has too much water. The extra fluid in your body can raise your blood pressure and force your heart to work harder. It can also make it hard for you to breathe. Most of your body is made up of water. The body uses minerals like sodium and potassium to help organs such as your heart, kidneys, and liver balance how much water you need. For example, the heart pumps blood to move water around the body. And the kidneys work to get rid of the water that the body doesn't need. Health conditions like kidney disease, heart failure, and cirrhosis can cause fluid overload. Other things can cause extra fluid to build up. IV fluids, some medicines, too much salt (sodium) from food, and certain medical treatments can sometimes cause this fluid increase. What are the symptoms? Some of the most common symptoms are:  · Gaining weight over a short period of time. · Swelling in the ankles or legs. · Shortness of breath. How is it treated? The goal of treatment is to remove the extra fluid in your body. Your treatment will depend on the cause. Your doctor may:  · Give you medicines, such as diuretics (also called \"water pills\"). They help your body get rid of the extra fluid. · Restrict your fluid or salt intake. Follow-up care is a key part of your treatment and safety. Be sure to make and go to all appointments, and call your doctor if you are having problems. It's also a good idea to know your test results and keep a list of the medicines you take. Where can you learn more? Go to http://bal-rosa isela.info/. Enter O110 in the search box to learn more about \"Learning About Fluid Overload. \"  Current as of: September 21, 2016  Content Version: 11.4  © 8250-6705 [employer].  Care instructions adapted under license by [employer] (which disclaims liability or warranty for this information). If you have questions about a medical condition or this instruction, always ask your healthcare professional. [address] any warranty or liability for your use of this information. High Blood Pressure: Care Instructions  Your Care Instructions    If your blood pressure is usually above 140/90, you have high blood pressure, or hypertension. That means the top number is 140 or higher or the bottom number is 90 or higher, or both. Despite what a lot of people think, high blood pressure usually doesn't cause headaches or make you feel dizzy or lightheaded. It usually has no symptoms. But it does increase your risk for heart attack, stroke, and kidney or eye damage. The higher your blood pressure, the more your risk increases. Your doctor will give you a goal for your blood pressure. Your goal will be based on your health and your age. An example of a goal is to keep your blood pressure below 140/90. Lifestyle changes, such as eating healthy and being active, are always important to help lower blood pressure. You might also take medicine to reach your blood pressure goal.  Follow-up care is a key part of your treatment and safety. Be sure to make and go to all appointments, and call your doctor if you are having problems. It's also a good idea to know your test results and keep a list of the medicines you take. How can you care for yourself at home? Medical treatment  · If you stop taking your medicine, your blood pressure will go back up. You may take one or more types of medicine to lower your blood pressure. Be safe with medicines. Take your medicine exactly as prescribed. Call your doctor if you think you are having a problem with your medicine. · Talk to your doctor before you start taking aspirin every day. Aspirin can help certain people lower their risk of a heart attack or stroke.  But taking aspirin isn't right for everyone, because it can cause serious bleeding. · See your doctor regularly. You may need to see the doctor more often at first or until your blood pressure comes down. · If you are taking blood pressure medicine, talk to your doctor before you take decongestants or anti-inflammatory medicine, such as ibuprofen. Some of these medicines can raise blood pressure. · Learn how to check your blood pressure at home. Lifestyle changes  · Stay at a healthy weight. This is especially important if you put on weight around the waist. Losing even 10 pounds can help you lower your blood pressure. · If your doctor recommends it, get more exercise. Walking is a good choice. Bit by bit, increase the amount you walk every day. Try for at least 30 minutes on most days of the week. You also may want to swim, bike, or do other activities. · Avoid or limit alcohol. Talk to your doctor about whether you can drink any alcohol. · Try to limit how much sodium you eat to less than 2,300 milligrams (mg) a day. Your doctor may ask you to try to eat less than 1,500 mg a day. · Eat plenty of fruits (such as bananas and oranges), vegetables, legumes, whole grains, and low-fat dairy products. · Lower the amount of saturated fat in your diet. Saturated fat is found in animal products such as milk, cheese, and meat. Limiting these foods may help you lose weight and also lower your risk for heart disease. · Do not smoke. Smoking increases your risk for heart attack and stroke. If you need help quitting, talk to your doctor about stop-smoking programs and medicines. These can increase your chances of quitting for good. When should you call for help? Call 911 anytime you think you may need emergency care. This may mean having symptoms that suggest that your blood pressure is causing a serious heart or blood vessel problem. Your blood pressure may be over 180/110. ? For example, call 911 if:  ? · You have symptoms of a heart attack.  These may include:  ¨ Chest pain or pressure, or a strange feeling in the chest.  ¨ Sweating. ¨ Shortness of breath. ¨ Nausea or vomiting. ¨ Pain, pressure, or a strange feeling in the back, neck, jaw, or upper belly or in one or both shoulders or arms. ¨ Lightheadedness or sudden weakness. ¨ A fast or irregular heartbeat. ? · You have symptoms of a stroke. These may include:  ¨ Sudden numbness, tingling, weakness, or loss of movement in your face, arm, or leg, especially on only one side of your body. ¨ Sudden vision changes. ¨ Sudden trouble speaking. ¨ Sudden confusion or trouble understanding simple statements. ¨ Sudden problems with walking or balance. ¨ A sudden, severe headache that is different from past headaches. ? · You have severe back or belly pain. ?Do not wait until your blood pressure comes down on its own. Get help right away. ?Call your doctor now or seek immediate care if:  ? · Your blood pressure is much higher than normal (such as 180/110 or higher), but you don't have symptoms. ? · You think high blood pressure is causing symptoms, such as:  ¨ Severe headache. ¨ Blurry vision. ? Watch closely for changes in your health, and be sure to contact your doctor if:  ? · Your blood pressure measures 140/90 or higher at least 2 times. That means the top number is 140 or higher or the bottom number is 90 or higher, or both. ? · You think you may be having side effects from your blood pressure medicine. ? · Your blood pressure is usually normal, but it goes above normal at least 2 times. Where can you learn more? Go to http://bal-rosa isela.info/. Enter X682 in the search box to learn more about \"High Blood Pressure: Care Instructions. \"  Current as of: September 21, 2016  Content Version: 11.4  © 8193-6521 [employer]. Care instructions adapted under license by [employer] (which disclaims liability or warranty for this information).  If you have questions about a medical condition or this instruction, always ask your healthcare professional. [place] [address] any warranty or liability for your use of this information.

## 2018-02-08 NOTE — PROGRESS NOTES
Bedside and Verbal shift change report given to self (oncoming nurse) by Grant Guillory RN (offgoing nurse). Report included the following information SBAR, Kardex, MAR and Recent Results.

## 2018-02-08 NOTE — PROGRESS NOTES
Problem: Falls - Risk of  Goal: *Absence of Falls  Document Leann Fall Risk and appropriate interventions in the flowsheet. Outcome: Progressing Towards Goal  Pt progressing towards goal. No falls since admission. Bed low and locked. Call light within reach. Side rails x 2. Gripper socks applied. Personal belongings within reach. Pt verbalizes understanding to call for assistance.      Fall Risk Interventions:  Mobility Interventions: Patient to call before getting OOB         Medication Interventions: Patient to call before getting OOB

## 2018-02-08 NOTE — DISCHARGE SUMMARY
[facility] Cardiology Discharge Summary     Patient ID:  Yumiko Toledo  [id number]  54 y.o.  5/1/1931    Admit date: 2/6/2018    Discharge date:  02/08/2018    Admitting Physician: Robin Warren MD     Discharge Physician: Lilibeth Jordan NP/Dr. Francis Matos    Admission Diagnoses: Accelerated hypertension    Discharge Diagnoses:    Diagnosis    Accelerated hypertension    Left bundle branch block    Diastolic CHF, chronic (HCC)    Acute respiratory failure with hypoxia ([address]    Diastolic CHF, acute on chronic (HCC)    Localized edema    Unstable angina (HCC)    S/P CABG (coronary artery bypass graft)    Paroxysmal atrial fibrillation ([address]    CAD (coronary artery disease)    Hyponatremia    Hypothyroidism    Coronary atherosclerosis of native coronary artery--PTCA 1997 LAD/diag    Mitral valve insufficiency    Unspecified hypothyroidism    Hypertension    Hyperlipemia       Cardiology Procedures this admission:  None  Consults: None    Hospital Course: Patient presented to the emergency department of [facility] with complaints of progressive shortness of breath and DEVINE. BP was elevated at 198/85 with CXR with bilateral effusions and volume overload and labs showing  and Hgb 9.7. EKG showed SR with new LBBB. Patient was diuresed with IV lasix with improvement and she was transitioned to PO lasix. At time of discharge, patient was up feeling well without any complaints shortness of breath. Patient's labs were stable with creatinine of  1.65. Patient was seen and examined by Dr. Francis Matos and determined stable and ready for discharge. Patient was instructed on the importance of medication compliance, low sodium diet, 2 liter per day fluid restriction and daily weights. The patient has been scheduled for follow up with [facility] -- Dr. Brad Joyce. Tina Carmona in 2 weeks. Patient will have BMP and mag drawn on Monday .     DISPOSITION: The patient is being discharged home in stable condition on a low saturated fat, low cholesterol and low salt diet. The patient is instructed to advance activities as tolerated to the limit of fatigue or shortness of breath. The patient is informed to monitor daily weights and maintain a 2 liter per day fluid restriction. The patient is instructed to call the office for any shortness of breath, weight gain, or increased peripheral edema. Discharge Exam:   Visit Vitals    /58 (BP 1 Location: Right arm, BP Patient Position: At rest)    Pulse 60    Temp 97.3 °F (36.3 °C)    Resp 18    Ht 5' (1.524 m)    Wt 54.6 kg (120 lb 6.4 oz)    SpO2 95%    BMI 23.51 kg/m2        Recent Results (from the past 24 hour(s))   TROPONIN I    Collection Time: 02/07/18 12:00 PM   Result Value Ref Range    Troponin-I, Qt. <0.02 (L) 0.02 - 1.02 NG/ML   METABOLIC PANEL, BASIC    Collection Time: 02/08/18  4:14 AM   Result Value Ref Range    Sodium 139 136 - 145 mmol/L    Potassium 3.9 3.5 - 5.1 mmol/L    Chloride 103 98 - 107 mmol/L    CO2 24 21 - 32 mmol/L    Anion gap 12 7 - 16 mmol/L    Glucose 96 65 - 100 mg/dL    BUN 54 (H) 8 - 23 MG/DL    Creatinine 1.65 (H) 0.6 - 1.0 MG/DL    GFR est AA 38 (L) >60 ml/min/1.73m2    GFR est non-AA 31 (L) >60 ml/min/1.73m2    Calcium 8.8 8.3 - 10.4 MG/DL   MAGNESIUM    Collection Time: 02/08/18  4:14 AM   Result Value Ref Range    Magnesium 2.5 (H) 1.8 - 2.4 mg/dL         Patient Instructions:   Current Discharge Medication List      START taking these medications    Details   nitroglycerin (NITROSTAT) 0.4 mg SL tablet 1 Tab by SubLINGual route every five (5) minutes as needed for Chest Pain. Qty: 1 Bottle, Refills: 5         CONTINUE these medications which have CHANGED    Details   furosemide (LASIX) 20 mg tablet Take 1 Tab by mouth daily. Qty: 30 Tab, Refills: 11         CONTINUE these medications which have NOT CHANGED    Details   amiodarone (CORDARONE) 200 mg tablet Take 0.5 Tabs by mouth daily.   Qty: 30 Tab, Refills: 5      levothyroxine (SYNTHROID) 137 mcg tablet Take  by mouth Daily (before breakfast). metoprolol succinate (TOPROL-XL) 25 mg XL tablet Take  by mouth daily. pantoprazole (PROTONIX) 40 mg tablet Take 1 Tab by mouth Daily (before breakfast). Qty: 30 Tab, Refills: 6      HYDROcodone-acetaminophen (NORCO) 5-325 mg per tablet Take 1 Tab by mouth two (2) times a day. celecoxib (CELEBREX) 100 mg capsule Take  by mouth two (2) times a day. rosuvastatin (CRESTOR) 40 mg tablet Take 40 mg by mouth nightly. clopidogrel (PLAVIX) 75 mg tablet Take 1 Tab by mouth daily. Qty: 90 Tab, Refills: 3      DULoxetine (CYMBALTA) 20 mg capsule Take 20 mg by mouth daily. Calcium Carbonate-Vit D3-Min (CALTRATE 600+D PLUS MINERALS) 600 mg calcium- 400 unit tab Take  by mouth.      gemfibrozil (LOPID) 600 mg tablet Take 600 mg by mouth two (2) times a day. aspirin 81 mg chewable tablet Take 81 mg by mouth daily. ferrous sulfate (SLOW FE) 142 mg (45 mg iron) ER tablet Take  by mouth three (3) times daily (with meals). rOPINIRole (REQUIP) 1 mg tablet Take 1 mg by mouth three (3) times daily. 12pm - 3pm -9pm      dextran 70-hypromellose (ARTIFICIAL TEARS) ophthalmic solution Administer 2 drops to both eyes as needed. ascorbic acid (VITAMIN C) 500 mg tablet Take 500 mg by mouth daily. timolol maleate 0.5 % drpd ophthalmic solution Administer 1 drop to both eyes daily. 9 am      latanoprost (XALATAN) 0.005 % ophthalmic solution Administer 1 Drop to both eyes nightly.                Signed:  Gio Alfaro NP  2/8/2018 8:31 AM

## 2018-02-08 NOTE — PROGRESS NOTES
Problem: Falls - Risk of  Goal: *Absence of Falls  Document Leann Fall Risk and appropriate interventions in the flowsheet. Outcome: Progressing Towards Goal  Fall Risk Interventions:  Mobility Interventions: Bed/chair exit alarm, Communicate number of staff needed for ambulation/transfer, Patient to call before getting OOB      Pt progressing towards goal. No falls since admission. Bed low and locked. Call light within reach. Side rails x 2. Gripper socks applied. Personal belongings within reach. Pt verbalizes understanding to call for assistance.      Medication Interventions: Bed/chair exit alarm, Evaluate medications/consider consulting pharmacy, Patient to call before getting OOB

## 2018-02-08 NOTE — PROGRESS NOTES
Bedside and Verbal shift change report given to MELITA Stern (oncoming nurse) by self (offgoing nurse). Report included the following information SBAR, Kardex, MAR and Recent Results.

## 2018-02-12 ENCOUNTER — HOSPITAL ENCOUNTER (OUTPATIENT)
Dept: LAB | Age: 83
Discharge: HOME | End: 2018-02-12
Payer: MEDICARE

## 2018-02-12 LAB
ANION GAP SERPL CALC-SCNC: 7 MMOL/L
BUN SERPL-MCNC: 55 MG/DL (ref 8–23)
CALCIUM SERPL-MCNC: 10.1 MG/DL (ref 8.3–10.4)
CHLORIDE SERPL-SCNC: 102 MMOL/L (ref 98–107)
CO2 SERPL-SCNC: 29 MMOL/L (ref 21–32)
CREAT SERPL-MCNC: 1.7 MG/DL (ref 0.6–1)
GLUCOSE SERPL-MCNC: 117 MG/DL (ref 65–100)
MAGNESIUM SERPL-MCNC: 3 MG/DL (ref 1.8–2.4)
POTASSIUM SERPL-SCNC: 4.2 MMOL/L (ref 3.5–5.1)
SODIUM SERPL-SCNC: 138 MMOL/L (ref 136–145)

## 2018-02-12 PROCEDURE — 80048 BASIC METABOLIC PNL TOTAL CA: CPT | Performed by: NURSE PRACTITIONER

## 2018-02-12 PROCEDURE — 36415 COLL VENOUS BLD VENIPUNCTURE: CPT | Performed by: NURSE PRACTITIONER

## 2018-02-12 PROCEDURE — 83735 ASSAY OF MAGNESIUM: CPT | Performed by: NURSE PRACTITIONER

## 2018-02-20 ENCOUNTER — HOSPITAL ENCOUNTER (OUTPATIENT)
Dept: LAB | Age: 83
Discharge: HOME | End: 2018-02-20
Payer: MEDICARE

## 2018-02-20 DIAGNOSIS — N18.2 STAGE 2 CHRONIC KIDNEY DISEASE: ICD-10-CM

## 2018-02-20 LAB
ANION GAP SERPL CALC-SCNC: 6 MMOL/L
BUN SERPL-MCNC: 40 MG/DL (ref 8–23)
CALCIUM SERPL-MCNC: 10 MG/DL (ref 8.3–10.4)
CHLORIDE SERPL-SCNC: 107 MMOL/L (ref 98–107)
CO2 SERPL-SCNC: 27 MMOL/L (ref 21–32)
CREAT SERPL-MCNC: 1.2 MG/DL (ref 0.6–1)
GLUCOSE SERPL-MCNC: 88 MG/DL (ref 65–100)
POTASSIUM SERPL-SCNC: 4.8 MMOL/L (ref 3.5–5.1)
SODIUM SERPL-SCNC: 140 MMOL/L (ref 136–145)

## 2018-02-20 PROCEDURE — 80048 BASIC METABOLIC PNL TOTAL CA: CPT | Performed by: INTERNAL MEDICINE

## 2018-02-20 PROCEDURE — 36415 COLL VENOUS BLD VENIPUNCTURE: CPT | Performed by: INTERNAL MEDICINE

## 2019-01-17 PROBLEM — J15.212 PNEUMONIA DUE TO METHICILLIN RESISTANT STAPHYLOCOCCUS AUREUS (HCC): Status: ACTIVE | Noted: 2019-01-17

## 2019-03-24 ENCOUNTER — HOSPITAL ENCOUNTER (OUTPATIENT)
Dept: LAB | Age: 84
Discharge: HOME | End: 2019-03-24

## 2019-03-24 LAB
BASOPHILS # BLD: 0.1 K/UL (ref 0–0.2)
BASOPHILS NFR BLD: 1 % (ref 0–2)
DIFFERENTIAL METHOD BLD: ABNORMAL
EOSINOPHIL # BLD: 0.3 K/UL (ref 0–0.8)
EOSINOPHIL NFR BLD: 2 % (ref 0.5–7.8)
ERYTHROCYTE [DISTWIDTH] IN BLOOD BY AUTOMATED COUNT: 15.9 % (ref 11.9–14.6)
HCT VFR BLD AUTO: 24.3 % (ref 35.8–46.3)
HGB BLD-MCNC: 7.6 G/DL (ref 11.7–15.4)
IMM GRANULOCYTES # BLD AUTO: 0.4 K/UL (ref 0–0.5)
IMM GRANULOCYTES NFR BLD AUTO: 4 % (ref 0–5)
LYMPHOCYTES # BLD: 1.7 K/UL (ref 0.5–4.6)
LYMPHOCYTES NFR BLD: 16 % (ref 13–44)
MCH RBC QN AUTO: 31.7 PG (ref 26.1–32.9)
MCHC RBC AUTO-ENTMCNC: 31.3 G/DL (ref 31.4–35)
MCV RBC AUTO: 101.3 FL (ref 79.6–97.8)
MONOCYTES # BLD: 1.2 K/UL (ref 0.1–1.3)
MONOCYTES NFR BLD: 12 % (ref 4–12)
NEUTS SEG # BLD: 6.9 K/UL (ref 1.7–8.2)
NEUTS SEG NFR BLD: 66 % (ref 43–78)
NRBC # BLD: 0.02 K/UL (ref 0–0.2)
PLATELET # BLD AUTO: 341 K/UL (ref 150–450)
PMV BLD AUTO: 10 FL (ref 9.4–12.3)
RBC # BLD AUTO: 2.4 M/UL (ref 4.05–5.2)
WBC # BLD AUTO: 10.5 K/UL (ref 4.3–11.1)

## 2019-03-24 PROCEDURE — 85025 COMPLETE CBC W/AUTO DIFF WBC: CPT

## 2019-07-12 ENCOUNTER — HOSPITAL ENCOUNTER (INPATIENT)
Age: 84
LOS: 3 days | Discharge: HOME | DRG: 292 | End: 2019-07-15
Attending: INTERNAL MEDICINE | Admitting: INTERNAL MEDICINE
Payer: MEDICARE

## 2019-07-12 ENCOUNTER — APPOINTMENT (OUTPATIENT)
Dept: ULTRASOUND IMAGING | Age: 84
DRG: 292 | End: 2019-07-12
Attending: NURSE PRACTITIONER
Payer: MEDICARE

## 2019-07-12 PROBLEM — I50.9 HEART FAILURE (HCC): Status: ACTIVE | Noted: 2019-07-12

## 2019-07-12 PROBLEM — R60.9 EDEMA: Status: ACTIVE | Noted: 2019-07-12

## 2019-07-12 PROCEDURE — 77030019605

## 2019-07-12 PROCEDURE — 74011250636 HC RX REV CODE- 250/636: Performed by: NURSE PRACTITIONER

## 2019-07-12 PROCEDURE — 74011000250 HC RX REV CODE- 250: Performed by: NURSE PRACTITIONER

## 2019-07-12 PROCEDURE — 77010033678 HC OXYGEN DAILY

## 2019-07-12 PROCEDURE — 94760 N-INVAS EAR/PLS OXIMETRY 1: CPT

## 2019-07-12 PROCEDURE — 93306 TTE W/DOPPLER COMPLETE: CPT

## 2019-07-12 PROCEDURE — 93971 EXTREMITY STUDY: CPT

## 2019-07-12 PROCEDURE — 65660000000 HC RM CCU STEPDOWN

## 2019-07-12 PROCEDURE — 74011250637 HC RX REV CODE- 250/637: Performed by: NURSE PRACTITIONER

## 2019-07-12 RX ORDER — PANTOPRAZOLE SODIUM 40 MG/1
40 TABLET, DELAYED RELEASE ORAL
Status: DISCONTINUED | OUTPATIENT
Start: 2019-07-13 | End: 2019-07-15 | Stop reason: HOSPADM

## 2019-07-12 RX ORDER — DICLOFENAC SODIUM 10 MG/G
GEL TOPICAL 4 TIMES DAILY
Status: DISCONTINUED | OUTPATIENT
Start: 2019-07-12 | End: 2019-07-15 | Stop reason: HOSPADM

## 2019-07-12 RX ORDER — GABAPENTIN 300 MG/1
300 CAPSULE ORAL 2 TIMES DAILY
Status: DISCONTINUED | OUTPATIENT
Start: 2019-07-12 | End: 2019-07-15 | Stop reason: HOSPADM

## 2019-07-12 RX ORDER — SODIUM CHLORIDE 0.9 % (FLUSH) 0.9 %
5-40 SYRINGE (ML) INJECTION AS NEEDED
Status: DISCONTINUED | OUTPATIENT
Start: 2019-07-12 | End: 2019-07-15 | Stop reason: HOSPADM

## 2019-07-12 RX ORDER — METOPROLOL SUCCINATE 25 MG/1
25 TABLET, EXTENDED RELEASE ORAL DAILY
Status: DISCONTINUED | OUTPATIENT
Start: 2019-07-13 | End: 2019-07-15 | Stop reason: HOSPADM

## 2019-07-12 RX ORDER — ROPINIROLE 1 MG/1
1 TABLET, FILM COATED ORAL 3 TIMES DAILY
Status: DISCONTINUED | OUTPATIENT
Start: 2019-07-12 | End: 2019-07-15 | Stop reason: HOSPADM

## 2019-07-12 RX ORDER — SODIUM CHLORIDE 0.9 % (FLUSH) 0.9 %
5-40 SYRINGE (ML) INJECTION EVERY 8 HOURS
Status: DISCONTINUED | OUTPATIENT
Start: 2019-07-12 | End: 2019-07-15 | Stop reason: HOSPADM

## 2019-07-12 RX ORDER — DULOXETIN HYDROCHLORIDE 60 MG/1
60 CAPSULE, DELAYED RELEASE ORAL DAILY
Status: DISCONTINUED | OUTPATIENT
Start: 2019-07-13 | End: 2019-07-15 | Stop reason: HOSPADM

## 2019-07-12 RX ORDER — AMIODARONE HYDROCHLORIDE 200 MG/1
100 TABLET ORAL DAILY
Status: DISCONTINUED | OUTPATIENT
Start: 2019-07-13 | End: 2019-07-15 | Stop reason: HOSPADM

## 2019-07-12 RX ORDER — TIMOLOL MALEATE 5 MG/ML
1 SOLUTION/ DROPS OPHTHALMIC DAILY
Status: DISCONTINUED | OUTPATIENT
Start: 2019-07-13 | End: 2019-07-15 | Stop reason: HOSPADM

## 2019-07-12 RX ORDER — ROSUVASTATIN CALCIUM 20 MG/1
40 TABLET, COATED ORAL
Status: DISCONTINUED | OUTPATIENT
Start: 2019-07-12 | End: 2019-07-15 | Stop reason: HOSPADM

## 2019-07-12 RX ORDER — LEVOTHYROXINE SODIUM 112 UG/1
112 TABLET ORAL
Status: DISCONTINUED | OUTPATIENT
Start: 2019-07-13 | End: 2019-07-15 | Stop reason: HOSPADM

## 2019-07-12 RX ORDER — GEMFIBROZIL 600 MG/1
600 TABLET, FILM COATED ORAL
Status: DISCONTINUED | OUTPATIENT
Start: 2019-07-12 | End: 2019-07-15 | Stop reason: HOSPADM

## 2019-07-12 RX ORDER — LATANOPROST 50 UG/ML
1 SOLUTION/ DROPS OPHTHALMIC
Status: DISCONTINUED | OUTPATIENT
Start: 2019-07-12 | End: 2019-07-15 | Stop reason: HOSPADM

## 2019-07-12 RX ORDER — NITROGLYCERIN 0.4 MG/1
0.4 TABLET SUBLINGUAL
Status: DISCONTINUED | OUTPATIENT
Start: 2019-07-12 | End: 2019-07-15 | Stop reason: HOSPADM

## 2019-07-12 RX ORDER — FUROSEMIDE 10 MG/ML
40 INJECTION INTRAMUSCULAR; INTRAVENOUS 2 TIMES DAILY
Status: DISCONTINUED | OUTPATIENT
Start: 2019-07-12 | End: 2019-07-14

## 2019-07-12 RX ADMIN — ROPINIROLE HYDROCHLORIDE 1 MG: 1 TABLET, FILM COATED ORAL at 18:16

## 2019-07-12 RX ADMIN — ROSUVASTATIN CALCIUM 40 MG: 20 TABLET, COATED ORAL at 20:47

## 2019-07-12 RX ADMIN — FUROSEMIDE 40 MG: 10 INJECTION, SOLUTION INTRAMUSCULAR; INTRAVENOUS at 18:16

## 2019-07-12 RX ADMIN — GEMFIBROZIL 600 MG: 600 TABLET ORAL at 18:16

## 2019-07-12 RX ADMIN — ROPINIROLE HYDROCHLORIDE 1 MG: 1 TABLET, FILM COATED ORAL at 20:47

## 2019-07-12 RX ADMIN — Medication 10 ML: at 20:53

## 2019-07-12 RX ADMIN — APIXABAN 2.5 MG: 2.5 TABLET, FILM COATED ORAL at 18:16

## 2019-07-12 RX ADMIN — LATANOPROST 1 DROP: 50 SOLUTION OPHTHALMIC at 20:55

## 2019-07-12 RX ADMIN — GABAPENTIN 300 MG: 300 CAPSULE ORAL at 20:47

## 2019-07-12 RX ADMIN — Medication 10 ML: at 18:16

## 2019-07-12 NOTE — PROGRESS NOTES
Dual skin assessment completed with 2nd RN. Scars present on R hip from fracture, L upper leg, and sternal chest RT CABG. RLE red and swollen 1+2+pitting edema. LLE swollen 1+pitting. Skin fragile overall. Sacrum bony and pink, but blanchable. Will apply allevyn. Small scattered scabs to BUE. Call light within reach. No c/o CP or SOB at this time.

## 2019-07-12 NOTE — H&P
[facility] Cardiology History & Physical      Date of  Admission: 7/12/2019 12:56 PM     Primary Care Physician: Dr. Eb Vasquez  Primary Cardiologist: Dr. Cabrera Salas   Admitting Physician: Dr. Shelley Barrera     CC: CHF    HPI:  Rj Chun is a 80 y.o. female with prior h/o CAD s/p PCI and CABG x3 , CVA, PA. Fib, HLP, and HTN. Patient presented to [address] [place] [address] with c/o SOB with some vague chest discomfort noticed more with lying flat. She reports weight gain several days ago along with PND/orthopnea. She presented to Ed at [facility]. In ED, she was given asa, lasix and NTG ointment, labs showed Hgb 9.2, Cr 1.26, trop neg, , CXR likely edema, A. Fib vent rate 69. Patient was transferred to [facility] for further care. She reports increased UOP and now SOB improved slightly. Her right leg chronically reg and swollen since right hip fx x 2 several months ago. Patient is now wheelchair bound. She denies any chest discomfort on admission.           Past Medical History:   Diagnosis Date    Anemia associated with acute blood loss 7/24/2011    Atrial fibrillation ([address] 7/13/2011    CAD (coronary artery disease) 1997    MI, PCI , CABG 7/13/2011    Chronic diastolic heart failure ([address] 10/29/2015    Chronic pain     Constipation- no BM since 7/30 8/5/2011    Coronary atherosclerosis of native coronary artery--PTCA 1997 LAD/diag 7/1/2011    CVA (cerebrovascular accident) ([address] 3/29/2009    mild memory issues, rls, afib    Diastolic CHF, acute on chronic ([address] 12/25/2017    GI bleed 1/11/2015    HEMOTHORAX ON LEFT  7/24/2011    Hyperlipemia 3/29/2009    Hypertension 3/29/2009    Hypertension, benign 10/29/2015    Hyponatremia 7/24/2011    Hypothyroidism 7/7/2011    Leucocytosis 8/3/2011    Leukocytosis 1/11/2015    Irene-Starr tear 1/12/2015    Mitral valve insufficiency 4/2/2009    Paroxysmal atrial fibrillation (HCC) 10/29/2015    Pleural effusion, bilateral 7/23/2011    Right thorocetesis 1200ml removed 11 and 1000ml removed 11 Left thorocentesis 1100ml removed 11     Pneumonia due to methicillin resistant Staphylococcus aureus ([facility] 2019    S/p thoracotomy- evauation of hemothorax 2011    Unspecified hypothyroidism 3/30/2009      Past Surgical History:   Procedure Laterality Date    CABG, ARTERY-VEIN, THREE  2011    HX BACK SURGERY      HX ORTHOPAEDIC  4 years ago    neck    HX ORTHOPAEDIC  10 years    back    HX OTHER SURGICAL      steroid injection neck, 2 weeks ago       Allergies   Allergen Reactions    Versed [Midazolam] Anxiety     Delirium       Social History     Socioeconomic History    Marital status:      Spouse name: Not on file    Number of children: Not on file    Years of education: Not on file    Highest education level: Not on file   Occupational History    Not on file   Social Needs    Financial resource strain: Not on file    Food insecurity:     Worry: Not on file     Inability: Not on file    Transportation needs:     Medical: Not on file     Non-medical: Not on file   Tobacco Use    Smoking status: Former Smoker     Last attempt to quit: 1     Years since quittin.5    Smokeless tobacco: Never Used   Substance and Sexual Activity    Alcohol use: No    Drug use: No    Sexual activity: Never   Lifestyle    Physical activity:     Days per week: Not on file     Minutes per session: Not on file    Stress: Not on file   Relationships    Social connections:     Talks on phone: Not on file     Gets together: Not on file     Attends Cheondo[employer] service: Not on file     Active member of club or organization: Not on file     Attends meetings of clubs or organizations: Not on file     Relationship status: Not on file    Intimate partner violence:     Fear of current or ex partner: Not on file     Emotionally abused: Not on file     Physically abused: Not on file     Forced sexual activity: Not on file   Other Topics Concern    Not on file   Social History Narrative    Not on file     Family History   Problem Relation Age of Onset    Heart Attack Father 36        No current facility-administered medications for this encounter. Review of Systems    Review of Systems   Constitution: Negative for diaphoresis, malaise/fatigue and weight gain. HENT: Negative for congestion. Cardiovascular: Positive for dyspnea on exertion, leg swelling (chronic swelling on right s/p hip surgery ), orthopnea and paroxysmal nocturnal dyspnea. Negative for chest pain, claudication, cyanosis, irregular heartbeat, near-syncope, palpitations and syncope. Respiratory: Positive for shortness of breath. Negative for cough and wheezing. Endocrine: Negative for cold intolerance and heat intolerance. Hematologic/Lymphatic: Does not bruise/bleed easily. Skin: Negative for nail changes. Neurological: Negative for dizziness, headaches and weakness. Subjective: There were no vitals taken for this visit. No intake/output data recorded. No intake/output data recorded. Physical Exam:  General: Well Developed, Well Nourished, No Acute Distress  HEENT: pupils equal and round, no abnormalities noted  Neck: supple, no JVD, no carotid bruits  Heart: S1S2 irregular irregular   Lungs: few crackles in bases   Abd: soft, nontender, nondistended, with good bowel sounds  Ext: warm, right lower leg 1+ edema with redness from knee to ankle (chronic per family), calves supple/nontender, pulses 2+ bilaterally  Skin: warm and dry  Psychiatric: Normal mood and affect  Neurologic: Alert and oriented X 3    Cardiographics  Telemetry:  controlled A. fib  ECG:  At [address] showed controlled A. fib  Echocardiogram: ordered today- but Last echo 2016 with preserved LV function.      Labs: see above    Patient has been seen and examined by Dr. Jory Martinez  and he agrees with the following assessment and plan:     Assessment/Plan:       Principal Problem:    Diastolic CHF, acute on chronic ([facility] (12/25/2017)- admit to tele; will start IV lasix with daily labs. Active Problems:    Atrial fibrillation ([facility] (7/13/2011)- rate controlled on Toprol. May need to consider stopping amiodarone since remains in A. Fib and rate control only. Will also continue eliquis. CAD (coronary artery disease) (8/4/2011)- trop neg at [facility]; will follow. Family agrees to no aggressive measures. Overview: CABG 2011:  LIMA-LAD, SVG-OM, SVG-PDA      Hypertension, benign (10/29/2015)- controlled       Edema (7/12/2019)- right leg chronic since fracture hip x 2. Unable to find US right leg done at [facility] in past but on eliquis.  Will order right duplex lower ext doppler scan       Ciarra Barrera NP  7/12/2019 1:13 PM

## 2019-07-12 NOTE — PROGRESS NOTES
Bedside and Verbal shift change report given to self (oncoming nurse) by Demi Singh RN (offgoing nurse). Report included the following information SBAR, Kardex, ED Summary, Intake/Output, MAR and Recent Results.

## 2019-07-13 LAB
ANION GAP SERPL CALC-SCNC: 9 MMOL/L (ref 7–16)
BUN SERPL-MCNC: 31 MG/DL (ref 8–23)
CALCIUM SERPL-MCNC: 9 MG/DL (ref 8.3–10.4)
CHLORIDE SERPL-SCNC: 102 MMOL/L (ref 98–107)
CO2 SERPL-SCNC: 28 MMOL/L (ref 21–32)
CREAT SERPL-MCNC: 1.17 MG/DL (ref 0.6–1)
ERYTHROCYTE [DISTWIDTH] IN BLOOD BY AUTOMATED COUNT: 18.3 % (ref 11.9–14.6)
GLUCOSE SERPL-MCNC: 74 MG/DL (ref 65–100)
HCT VFR BLD AUTO: 31 % (ref 35.8–46.3)
HGB BLD-MCNC: 9.7 G/DL (ref 11.7–15.4)
MAGNESIUM SERPL-MCNC: 2.6 MG/DL (ref 1.8–2.4)
MCH RBC QN AUTO: 31.3 PG (ref 26.1–32.9)
MCHC RBC AUTO-ENTMCNC: 31.3 G/DL (ref 31.4–35)
MCV RBC AUTO: 100 FL (ref 79.6–97.8)
NRBC # BLD: 0 K/UL (ref 0–0.2)
PLATELET # BLD AUTO: 405 K/UL (ref 150–450)
PMV BLD AUTO: 9.1 FL (ref 9.4–12.3)
POTASSIUM SERPL-SCNC: 3.1 MMOL/L (ref 3.5–5.1)
RBC # BLD AUTO: 3.1 M/UL (ref 4.05–5.2)
SODIUM SERPL-SCNC: 139 MMOL/L (ref 136–145)
WBC # BLD AUTO: 7.3 K/UL (ref 4.3–11.1)

## 2019-07-13 PROCEDURE — 74011250637 HC RX REV CODE- 250/637: Performed by: NURSE PRACTITIONER

## 2019-07-13 PROCEDURE — 85027 COMPLETE CBC AUTOMATED: CPT

## 2019-07-13 PROCEDURE — 94760 N-INVAS EAR/PLS OXIMETRY 1: CPT

## 2019-07-13 PROCEDURE — 65660000000 HC RM CCU STEPDOWN

## 2019-07-13 PROCEDURE — 83735 ASSAY OF MAGNESIUM: CPT

## 2019-07-13 PROCEDURE — 74011250637 HC RX REV CODE- 250/637: Performed by: PHYSICIAN ASSISTANT

## 2019-07-13 PROCEDURE — 74011000250 HC RX REV CODE- 250: Performed by: NURSE PRACTITIONER

## 2019-07-13 PROCEDURE — 36415 COLL VENOUS BLD VENIPUNCTURE: CPT

## 2019-07-13 PROCEDURE — 80048 BASIC METABOLIC PNL TOTAL CA: CPT

## 2019-07-13 PROCEDURE — 77010033678 HC OXYGEN DAILY

## 2019-07-13 PROCEDURE — 74011250636 HC RX REV CODE- 250/636: Performed by: NURSE PRACTITIONER

## 2019-07-13 RX ORDER — POTASSIUM CHLORIDE 20 MEQ/1
40 TABLET, EXTENDED RELEASE ORAL
Status: COMPLETED | OUTPATIENT
Start: 2019-07-13 | End: 2019-07-13

## 2019-07-13 RX ORDER — POTASSIUM CHLORIDE 20 MEQ/1
20 TABLET, EXTENDED RELEASE ORAL 2 TIMES DAILY
Status: DISCONTINUED | OUTPATIENT
Start: 2019-07-13 | End: 2019-07-15 | Stop reason: HOSPADM

## 2019-07-13 RX ADMIN — POTASSIUM CHLORIDE 40 MEQ: 20 TABLET, EXTENDED RELEASE ORAL at 09:01

## 2019-07-13 RX ADMIN — METOPROLOL SUCCINATE 25 MG: 25 TABLET, EXTENDED RELEASE ORAL at 09:00

## 2019-07-13 RX ADMIN — LATANOPROST 1 DROP: 50 SOLUTION OPHTHALMIC at 21:26

## 2019-07-13 RX ADMIN — ROPINIROLE HYDROCHLORIDE 1 MG: 1 TABLET, FILM COATED ORAL at 21:24

## 2019-07-13 RX ADMIN — GABAPENTIN 300 MG: 300 CAPSULE ORAL at 09:00

## 2019-07-13 RX ADMIN — LEVOTHYROXINE SODIUM 112 MCG: 0.11 TABLET ORAL at 09:00

## 2019-07-13 RX ADMIN — GABAPENTIN 300 MG: 300 CAPSULE ORAL at 21:24

## 2019-07-13 RX ADMIN — Medication 10 ML: at 06:12

## 2019-07-13 RX ADMIN — FUROSEMIDE 40 MG: 10 INJECTION, SOLUTION INTRAMUSCULAR; INTRAVENOUS at 17:02

## 2019-07-13 RX ADMIN — POTASSIUM CHLORIDE 20 MEQ: 20 TABLET, EXTENDED RELEASE ORAL at 17:07

## 2019-07-13 RX ADMIN — GEMFIBROZIL 600 MG: 600 TABLET ORAL at 16:08

## 2019-07-13 RX ADMIN — Medication 10 ML: at 09:05

## 2019-07-13 RX ADMIN — GEMFIBROZIL 600 MG: 600 TABLET ORAL at 09:01

## 2019-07-13 RX ADMIN — APIXABAN 2.5 MG: 2.5 TABLET, FILM COATED ORAL at 09:00

## 2019-07-13 RX ADMIN — APIXABAN 2.5 MG: 2.5 TABLET, FILM COATED ORAL at 17:07

## 2019-07-13 RX ADMIN — Medication 5 ML: at 14:16

## 2019-07-13 RX ADMIN — ROPINIROLE HYDROCHLORIDE 1 MG: 1 TABLET, FILM COATED ORAL at 16:08

## 2019-07-13 RX ADMIN — Medication 10 ML: at 21:26

## 2019-07-13 RX ADMIN — ROPINIROLE HYDROCHLORIDE 1 MG: 1 TABLET, FILM COATED ORAL at 11:40

## 2019-07-13 RX ADMIN — TIMOLOL MALEATE 1 DROP: 5 SOLUTION OPHTHALMIC at 09:10

## 2019-07-13 RX ADMIN — PANTOPRAZOLE SODIUM 40 MG: 40 TABLET, DELAYED RELEASE ORAL at 09:01

## 2019-07-13 RX ADMIN — AMIODARONE HYDROCHLORIDE 100 MG: 200 TABLET ORAL at 09:00

## 2019-07-13 RX ADMIN — FUROSEMIDE 40 MG: 10 INJECTION, SOLUTION INTRAMUSCULAR; INTRAVENOUS at 09:05

## 2019-07-13 RX ADMIN — DULOXETINE HYDROCHLORIDE 60 MG: 60 CAPSULE, DELAYED RELEASE ORAL at 09:00

## 2019-07-13 RX ADMIN — ROSUVASTATIN CALCIUM 40 MG: 20 TABLET, COATED ORAL at 21:24

## 2019-07-13 NOTE — PROGRESS NOTES
[facility] CARDIOLOGY PROGRESS NOTE           7/13/2019 8:32 AM    Admit Date: 7/12/2019      Subjective:   Feels better. Lower extremity ultrasound showed no DVT. ROS:  GEN:  No fever or chills  Cardiovascular:  As noted above:no CP or palpitations. Pulmonary:  As noted above:Improved SOB. Neuro:  No new focal motor or sensory loss    Objective:      Vitals:    07/12/19 2121 07/13/19 0129 07/13/19 0559 07/13/19 0717   BP: 105/61 105/68 105/63    Pulse: 69 64 73    Resp: 18 18 18    Temp: 98.2 °F (36.8 °C) 98.6 °F (37 °C) 97.7 °F (36.5 °C)    SpO2: 98% 98% 98% 96%   Weight:   53.3 kg (117 lb 9.6 oz)        Physical Exam:  General-no distress  Neck- supple, no JVD  CV- irregular rate and rhythm no MRG  Lung- clear bilaterally  Abd- soft, nontender, nondistended  Ext- trace edema bilaterally. Skin- warm and dry  Psychiatric:  Normal mood and affect. Neurologic:  Alert and oriented X 3      Data Review:   Recent Labs     07/13/19  0402      K 3.1*   MG 2.6*   BUN 31*   CREA 1.17*   GLU 74   WBC 7.3   HGB 9.7*   HCT 31.0*          TELEMETRY:  AFIB with HR in 60-70 bpm.    Assessment/Plan:     Principal Problem:    Diastolic CHF, acute on chronic ([address] (12/25/2017): Acute on chronic. CHF improved. .Echo confirms normal LV EF with moderately severe MR &TR. Continue iv Lasix today. BNP,BMP,CXR in am.Add KCL with hypokalemia. Active Problems:    Atrial fibrillation ([address] (7/13/2011):Persistent. Continue Toprol and Eliquis. CAD (coronary artery disease) (8/4/2011)      Overview: CABG 2011:  LIMA-LAD, SVG-OM, SVG-PDA      Hypertension, benign (10/29/2015):stable on current medications. Edema (7/12/2019):Nearly resolved on iv Lasix.                 Cherelle Mayo MD  7/13/2019 8:32 AM

## 2019-07-13 NOTE — PROGRESS NOTES
Bedside and Verbal shift change report given to Saúl Denton RN (oncoming nurse) by self Anamika ibrahim). Report included the following information SBAR, Kardex, MAR and Recent Results.

## 2019-07-13 NOTE — PROGRESS NOTES
Pt's potassium 3.1 this morning. Pt receives 40 mglasix BID.  Per ARABELLA Wolff, administer 40 mEq potassium now and start 20 mEq BID this afternoon

## 2019-07-13 NOTE — PROGRESS NOTES
Problem: Falls - Risk of  Goal: *Absence of Falls  Description  Document Linette Late Fall Risk and appropriate interventions in the flowsheet. Outcome: Progressing Towards Goal   Bed alarm on and active. Pt understands to call if needs to get up. Problem: Patient Education: Go to Patient Education Activity  Goal: Patient/Family Education  Outcome: Progressing Towards Goal     Problem: Fluid Volume - Risk of, Imbalanced  Goal: *Balanced intake and output  Outcome: Progressing Towards Goal   Iv lasix for diuresis.   Problem: Patient Education: Go to Patient Education Activity  Goal: Patient/Family Education  Outcome: Progressing Towards Goal

## 2019-07-13 NOTE — PROGRESS NOTES
Bedside and Verbal shift change report given to self (oncoming nurse) by Chetan Syed RN (offgoing nurse). Report included the following information SBAR, Kardex, MAR, Recent Results and Cardiac Rhythm afib.

## 2019-07-13 NOTE — PROGRESS NOTES
Bedside and Verbal shift change report given to self (oncoming nurse) by Kemar Sung RN (offgoing nurse). Report included the following information SBAR, Kardex, Intake/Output, MAR and Recent Results.

## 2019-07-13 NOTE — PROGRESS NOTES
Bedside and Verbal shift change report given to Tab Putnam RN (oncoming nurse) by self (offgoing nurse). Report included the following information SBAR, Kardex, ED Summary, Intake/Output, MAR, Recent Results and Cardiac Rhythm Afib.

## 2019-07-13 NOTE — ROUTINE PROCESS
Patient admit with wt gain, pnd, orthopnea Hgb 9.2, , cxray with pulm edema-per notes. Patient echo with MV/TV mod to severe regurg. She has been encouraged to weigh and report weight gain as listed below. Patient stated her medications are handled by her daughter and I gave her contact number for questions and concerns after d/c in regards to regimen. Patient has been on IV lasix and has @1L negative at this point and states that she is feeling relief of respiratory symptoms. Continue to follow CHF teaching started post introduction to pt/family; aware of diagnosis. Planner/scale @ BS and will follow. Smoking/ ETOH/Illicit drug use cessation and maintain a healthy weight covered. Pt/family aware that I can not prescribe nor adjust  medications: 15mins Palliative Care score:20 ACP on file Start 2L/D Fluid restriction/ cardiac diet CHF teaching continues to pt/family. Emphasis on taking prescription meds as ordered, to keep F/U appts and to call MD STAT if any of the following occur: ? If you gain 2 lbs in one day or 5 lbs in a week, and short of breath. ? If you can not lay flat without developing short of breath or rapid breathing at night; or if it wakes you up. Develop a cough or wheezing. ? If you notice swollen hands/feet/ankles or stomach with a bloated/ full feeling. ? If you become confused or mentally fuzzy or dizzy. ? If you notice a rapid or change in your heart rate. ? If you become more exhausted all the time and unable to do the same level of activity without stopping to catch your breath. Drink no more than 8 cups a day in 8 oz. cups. Your Heart can not handle any more. Stay away from salt (limit anything with salt or sodium in it). Limit to 250mg per serving. Pt/family verbalizes understanding, will follow to reinforce teaching skills: 40 mins

## 2019-07-14 ENCOUNTER — APPOINTMENT (OUTPATIENT)
Dept: GENERAL RADIOLOGY | Age: 84
DRG: 292 | End: 2019-07-14
Attending: INTERNAL MEDICINE
Payer: MEDICARE

## 2019-07-14 LAB
ANION GAP SERPL CALC-SCNC: 9 MMOL/L (ref 7–16)
BNP SERPL-MCNC: 274 PG/ML
BUN SERPL-MCNC: 28 MG/DL (ref 8–23)
CALCIUM SERPL-MCNC: 9.5 MG/DL (ref 8.3–10.4)
CHLORIDE SERPL-SCNC: 100 MMOL/L (ref 98–107)
CO2 SERPL-SCNC: 28 MMOL/L (ref 21–32)
CREAT SERPL-MCNC: 1.3 MG/DL (ref 0.6–1)
GLUCOSE SERPL-MCNC: 89 MG/DL (ref 65–100)
MAGNESIUM SERPL-MCNC: 2.7 MG/DL (ref 1.8–2.4)
POTASSIUM SERPL-SCNC: 3.9 MMOL/L (ref 3.5–5.1)
SODIUM SERPL-SCNC: 137 MMOL/L (ref 136–145)

## 2019-07-14 PROCEDURE — 74011250637 HC RX REV CODE- 250/637: Performed by: INTERNAL MEDICINE

## 2019-07-14 PROCEDURE — 74011250637 HC RX REV CODE- 250/637: Performed by: NURSE PRACTITIONER

## 2019-07-14 PROCEDURE — 83880 ASSAY OF NATRIURETIC PEPTIDE: CPT

## 2019-07-14 PROCEDURE — 80048 BASIC METABOLIC PNL TOTAL CA: CPT

## 2019-07-14 PROCEDURE — 74011250637 HC RX REV CODE- 250/637: Performed by: PHYSICIAN ASSISTANT

## 2019-07-14 PROCEDURE — 36415 COLL VENOUS BLD VENIPUNCTURE: CPT

## 2019-07-14 PROCEDURE — 65660000000 HC RM CCU STEPDOWN

## 2019-07-14 PROCEDURE — 71046 X-RAY EXAM CHEST 2 VIEWS: CPT

## 2019-07-14 PROCEDURE — 83735 ASSAY OF MAGNESIUM: CPT

## 2019-07-14 RX ORDER — FUROSEMIDE 40 MG/1
40 TABLET ORAL DAILY
Status: DISCONTINUED | OUTPATIENT
Start: 2019-07-14 | End: 2019-07-15 | Stop reason: HOSPADM

## 2019-07-14 RX ADMIN — GEMFIBROZIL 600 MG: 600 TABLET ORAL at 09:51

## 2019-07-14 RX ADMIN — ROSUVASTATIN CALCIUM 40 MG: 20 TABLET, COATED ORAL at 21:12

## 2019-07-14 RX ADMIN — METOPROLOL SUCCINATE 25 MG: 25 TABLET, EXTENDED RELEASE ORAL at 09:51

## 2019-07-14 RX ADMIN — DULOXETINE HYDROCHLORIDE 60 MG: 60 CAPSULE, DELAYED RELEASE ORAL at 09:51

## 2019-07-14 RX ADMIN — FUROSEMIDE 40 MG: 40 TABLET ORAL at 09:51

## 2019-07-14 RX ADMIN — GABAPENTIN 300 MG: 300 CAPSULE ORAL at 21:12

## 2019-07-14 RX ADMIN — TIMOLOL MALEATE 1 DROP: 5 SOLUTION OPHTHALMIC at 09:55

## 2019-07-14 RX ADMIN — POTASSIUM CHLORIDE 20 MEQ: 20 TABLET, EXTENDED RELEASE ORAL at 09:51

## 2019-07-14 RX ADMIN — LATANOPROST 1 DROP: 50 SOLUTION OPHTHALMIC at 21:14

## 2019-07-14 RX ADMIN — AMIODARONE HYDROCHLORIDE 100 MG: 200 TABLET ORAL at 09:52

## 2019-07-14 RX ADMIN — APIXABAN 2.5 MG: 2.5 TABLET, FILM COATED ORAL at 09:51

## 2019-07-14 RX ADMIN — Medication 10 ML: at 21:12

## 2019-07-14 RX ADMIN — LEVOTHYROXINE SODIUM 112 MCG: 0.11 TABLET ORAL at 09:52

## 2019-07-14 RX ADMIN — APIXABAN 2.5 MG: 2.5 TABLET, FILM COATED ORAL at 17:14

## 2019-07-14 RX ADMIN — GEMFIBROZIL 600 MG: 600 TABLET ORAL at 17:14

## 2019-07-14 RX ADMIN — Medication 10 ML: at 05:16

## 2019-07-14 RX ADMIN — POTASSIUM CHLORIDE 20 MEQ: 20 TABLET, EXTENDED RELEASE ORAL at 17:14

## 2019-07-14 RX ADMIN — GABAPENTIN 300 MG: 300 CAPSULE ORAL at 09:52

## 2019-07-14 RX ADMIN — ROPINIROLE HYDROCHLORIDE 1 MG: 1 TABLET, FILM COATED ORAL at 21:11

## 2019-07-14 RX ADMIN — PANTOPRAZOLE SODIUM 40 MG: 40 TABLET, DELAYED RELEASE ORAL at 09:51

## 2019-07-14 RX ADMIN — Medication 10 ML: at 15:01

## 2019-07-14 NOTE — PROGRESS NOTES
Patient resting when undisturbed. States breathing is \"better than yesterday\". Daughter at bedside. POC discussed.

## 2019-07-14 NOTE — PROGRESS NOTES
Problem: Falls - Risk of  Goal: *Absence of Falls  Description  Document Margrett Bernheim Fall Risk and appropriate interventions in the flowsheet. Outcome: Progressing Towards Goal   Bed alarm on. Pt calls for assistance to [facility]. Problem: Patient Education: Go to Patient Education Activity  Goal: Patient/Family Education  Outcome: Progressing Towards Goal     Problem: Fluid Volume - Risk of, Imbalanced  Goal: *Balanced intake and output  Outcome: Progressing Towards Goal   Pt has no current Edema in LLE; RLE is 1-2+ pitting and red.   Problem: Patient Education: Go to Patient Education Activity  Goal: Patient/Family Education  Outcome: Progressing Towards Goal

## 2019-07-14 NOTE — PROGRESS NOTES
Bedside and Verbal shift change report given to Kirsten Young RN (oncoming nurse) by self Karl Pierson nurse). Report included the following information SBAR, Kardex, ED Summary, Intake/Output, MAR, Recent Results and Cardiac Rhythm Afib .

## 2019-07-14 NOTE — ROUTINE PROCESS
Patient changing to PO lasix today with rise in Cr.  
2780 negative fluid balance. Symptoms improving. CHF teaching completed, verbalize emphasis on monitoring self and report to MD: 
? If you gain 2 lbs in one day or 5 lbs in a week, and short of breath. ? If you can not lay flat without developing short of breath or rapid breathing at night; or if it wakes you up. Develop a cough or wheezing. ? If you notice swollen hands/feet/ankles or stomach with a bloated/ full feeling. ? If you are  more confused or mentally fuzzy or dizzy. ? If you notice a rapid or change in your heart rate. ? If you become more exhausted all the time and unable to do the same level of activity without stopping to catch your breath. Drink no more than 8 cups a day in 8 oz. cups. Limit Cola Drinks. Your Heart can not handle any more. Stay away from salt (limit anything with salt or sodium in it). Limit to 250mg per serving. Exercise needs to be started with your Doctors approval. 
Reduce stress; Call myself or Provider if assistance is needed. Pass post test via teach back, will make self available post DC ,if an questions arise. Diabetic teaching completed.  Planner/scale @ BS:  60 mins total

## 2019-07-14 NOTE — PROGRESS NOTES
[facility] CARDIOLOGY PROGRESS NOTE           7/14/2019 8:30 AM    Admit Date: 7/12/2019      Subjective:   Feels better. Peripheral edema has nearly resolved. ROS:  GEN:  No fever or chills  Cardiovascular:  As noted above:no CP or palpitations. Pulmonary:  As noted above:SOB improved. Neuro:  No new focal motor or sensory loss    Objective:      Vitals:    07/14/19 0035 07/14/19 0403 07/14/19 0431 07/14/19 0825   BP: 109/67  104/54 145/86   Pulse: 85  73 98   Resp: 16  16 18   Temp: 98.4 °F (36.9 °C)  97.8 °F (36.6 °C) 98 °F (36.7 °C)   SpO2: 94%  91% 94%   Weight:  52.3 kg (115 lb 3.2 oz)         Physical Exam:  General-no distress  Neck- supple, no JVD  CV- irregular rate and rhythm no MRG  Lung- clear bilaterally  Abd- soft, nontender, nondistended  Ext- trace edema bilaterally. Skin- warm and dry  Psychiatric:  Normal mood and affect. Neurologic:  Alert and oriented X 3      Data Review:   Recent Labs     07/14/19  0343 07/13/19  0402    139   K 3.9 3.1*   MG 2.7* 2.6*   BUN 28* 31*   CREA 1.30* 1.17*   GLU 89 74   WBC  --  7.3   HGB  --  9.7*   HCT  --  31.0*   PLT  --  405       TELEMETRY:  Atrial fibrillation. Assessment/Plan:     Principal Problem:    Diastolic CHF, acute on chronic ([facility] (12/25/2017): Improving. Stop iv lasix. Start higher dose home po Lasix. CXR pending. Creatinine level is increased with iv Lasix. Stopping iv lasix. BMP in am.Hopefully home in am.  Active Problems:    Atrial fibrillation ([facility] (7/13/2011):persistent. Rate is controlled on chronic Amiodarone and low Eliquis. CAD (coronary artery disease) (8/4/2011): Asymptomatic. Sidney Gu Continue current medications. Overview: CABG 2011:  LIMA-LAD, SVG-OM, SVG-PDA      Hypertension, benign (10/29/2015). Continue current medications. Edema (7/12/2019): Improved on iv Lasix. ARF:Worse. Stop iv Lasix. BMP in am.                Lauren Barreto MD  7/14/2019 8:30 AM

## 2019-07-14 NOTE — PROGRESS NOTES
Bedside and Verbal shift change report given to self (oncoming nurse) by Dana Kelley RN (offgoing nurse). Report included the following information SBAR, Kardex, Intake/Output, MAR and Recent Results.

## 2019-07-15 ENCOUNTER — PATIENT OUTREACH (OUTPATIENT)
Dept: CASE MANAGEMENT | Age: 84
End: 2019-07-15

## 2019-07-15 VITALS
TEMPERATURE: 98.7 F | HEART RATE: 77 BPM | SYSTOLIC BLOOD PRESSURE: 113 MMHG | WEIGHT: 115.6 LBS | RESPIRATION RATE: 18 BRPM | OXYGEN SATURATION: 96 % | DIASTOLIC BLOOD PRESSURE: 65 MMHG | BODY MASS INDEX: 22.58 KG/M2

## 2019-07-15 LAB
ANION GAP SERPL CALC-SCNC: 8 MMOL/L (ref 7–16)
BUN SERPL-MCNC: 33 MG/DL (ref 8–23)
CALCIUM SERPL-MCNC: 9.6 MG/DL (ref 8.3–10.4)
CHLORIDE SERPL-SCNC: 102 MMOL/L (ref 98–107)
CO2 SERPL-SCNC: 28 MMOL/L (ref 21–32)
CREAT SERPL-MCNC: 1.44 MG/DL (ref 0.6–1)
GLUCOSE SERPL-MCNC: 95 MG/DL (ref 65–100)
MAGNESIUM SERPL-MCNC: 2.7 MG/DL (ref 1.8–2.4)
POTASSIUM SERPL-SCNC: 4 MMOL/L (ref 3.5–5.1)
SODIUM SERPL-SCNC: 138 MMOL/L (ref 136–145)

## 2019-07-15 PROCEDURE — 74011250637 HC RX REV CODE- 250/637: Performed by: INTERNAL MEDICINE

## 2019-07-15 PROCEDURE — 74011250637 HC RX REV CODE- 250/637: Performed by: NURSE PRACTITIONER

## 2019-07-15 PROCEDURE — 36415 COLL VENOUS BLD VENIPUNCTURE: CPT

## 2019-07-15 PROCEDURE — 80048 BASIC METABOLIC PNL TOTAL CA: CPT

## 2019-07-15 PROCEDURE — 74011250637 HC RX REV CODE- 250/637: Performed by: PHYSICIAN ASSISTANT

## 2019-07-15 PROCEDURE — 83735 ASSAY OF MAGNESIUM: CPT

## 2019-07-15 RX ORDER — FUROSEMIDE 40 MG/1
40 TABLET ORAL DAILY
Qty: 30 TAB | Refills: 6 | Status: SHIPPED | OUTPATIENT
Start: 2019-07-15 | End: 2019-08-13 | Stop reason: SDUPTHER

## 2019-07-15 RX ORDER — POTASSIUM CHLORIDE 20 MEQ/1
20 TABLET, EXTENDED RELEASE ORAL 2 TIMES DAILY
Qty: 30 TAB | Refills: 6 | Status: SHIPPED | OUTPATIENT
Start: 2019-07-15 | End: 2019-07-23 | Stop reason: SDUPTHER

## 2019-07-15 RX ADMIN — PANTOPRAZOLE SODIUM 40 MG: 40 TABLET, DELAYED RELEASE ORAL at 08:18

## 2019-07-15 RX ADMIN — LEVOTHYROXINE SODIUM 112 MCG: 0.11 TABLET ORAL at 08:18

## 2019-07-15 RX ADMIN — TIMOLOL MALEATE 1 DROP: 5 SOLUTION OPHTHALMIC at 08:22

## 2019-07-15 RX ADMIN — DULOXETINE HYDROCHLORIDE 60 MG: 60 CAPSULE, DELAYED RELEASE ORAL at 08:18

## 2019-07-15 RX ADMIN — METOPROLOL SUCCINATE 25 MG: 25 TABLET, EXTENDED RELEASE ORAL at 08:21

## 2019-07-15 RX ADMIN — APIXABAN 2.5 MG: 2.5 TABLET, FILM COATED ORAL at 08:19

## 2019-07-15 RX ADMIN — AMIODARONE HYDROCHLORIDE 100 MG: 200 TABLET ORAL at 08:21

## 2019-07-15 RX ADMIN — FUROSEMIDE 40 MG: 40 TABLET ORAL at 08:21

## 2019-07-15 RX ADMIN — Medication 10 ML: at 05:16

## 2019-07-15 RX ADMIN — GABAPENTIN 300 MG: 300 CAPSULE ORAL at 08:18

## 2019-07-15 RX ADMIN — POTASSIUM CHLORIDE 20 MEQ: 20 TABLET, EXTENDED RELEASE ORAL at 08:18

## 2019-07-15 RX ADMIN — GEMFIBROZIL 600 MG: 600 TABLET ORAL at 08:18

## 2019-07-15 NOTE — PROGRESS NOTES
Verbal shift change report given to self (oncoming nurse) by Maricarmen Hopkins RN (offgoing nurse). Report included the following information SBAR, Kardex, MAR, Recent Results and Cardiac Rhythm afib.      Pt sleeping calmly at this time  Bed alarm on

## 2019-07-15 NOTE — DISCHARGE INSTRUCTIONS
Instructions: The patient is being discharged home in stable condition on a low saturated fat, low cholesterol and low salt diet. The patient is instructed to advance activities as tolerated to the limit of fatigue or shortness of breath. The patient is informed to monitor daily weights and maintain a 2 liter per day fluid restriction. The patient is instructed to call the office for any shortness of breath, weight gain, or increased peripheral edema. Patient Education        Heart Failure: Care Instructions  Your Care Instructions    Heart failure occurs when your heart does not pump as much blood as the body needs. Failure does not mean that the heart has stopped pumping but rather that it is not pumping as well as it should. Over time, this causes fluid buildup in your lungs and other parts of your body. Fluid buildup can cause shortness of breath, fatigue, swollen ankles, and other problems. By taking medicines regularly, reducing sodium (salt) in your diet, checking your weight every day, and making lifestyle changes, you can feel better and live longer. Follow-up care is a key part of your treatment and safety. Be sure to make and go to all appointments, and call your doctor if you are having problems. It's also a good idea to know your test results and keep a list of the medicines you take. How can you care for yourself at home? Medicines    · Be safe with medicines. Take your medicines exactly as prescribed. Call your doctor if you think you are having a problem with your medicine.     · Do not take any vitamins, over-the-counter medicine, or herbal products without talking to your doctor first. Onetha Popper not take ibuprofen (Advil or Motrin) and naproxen (Aleve) without talking to your doctor first. They could make your heart failure worse.     · You may be taking some of the following medicine. ? Beta-blockers can slow heart rate, decrease blood pressure, and improve your condition.  Taking a beta-blocker may lower your chance of needing to be hospitalized. ? Angiotensin-converting enzyme inhibitors (ACEIs) reduce the heart's workload, lower blood pressure, and reduce swelling. Taking an ACEI may lower your chance of needing to be hospitalized again. ? Angiotensin II receptor blockers (ARBs) work like ACEIs. Your doctor may prescribe them instead of ACEIs. ? Diuretics, also called water pills, reduce swelling. ? Potassium supplements replace this important mineral, which is sometimes lost with diuretics. ? Aspirin and other blood thinners prevent blood clots, which can cause a stroke or heart attack.    You will get more details on the specific medicines your doctor prescribes. Diet    · Your doctor may suggest that you limit sodium to 2,000 milligrams (mg) a day or less. That is less than 1 teaspoon of salt a day, including all the salt you eat in cooking or in packaged foods. People get most of their sodium from processed foods. Fast food and restaurant meals also tend to be very high in sodium.     · Ask your doctor how much liquid you can drink each day. You may have to limit liquids.    Weight    · Weigh yourself without clothing at the same time each day. Record your weight. Call your doctor if you have a sudden weight gain, such as more than 2 to 3 pounds in a day or 5 pounds in a week. (Your doctor may suggest a different range of weight gain.) A sudden weight gain may mean that your heart failure is getting worse.    Activity level    · Start light exercise (if your doctor says it is okay). Even if you can only do a small amount, exercise will help you get stronger, have more energy, and manage your weight and your stress. Walking is an easy way to get exercise. Start out by walking a little more than you did before. Bit by bit, increase the amount you walk.     · When you exercise, watch for signs that your heart is working too hard.  You are pushing yourself too hard if you cannot talk while you are exercising. If you become short of breath or dizzy or have chest pain, stop, sit down, and rest.     · If you feel \"wiped out\" the day after you exercise, walk slower or for a shorter distance until you can work up to a better pace.     · Get enough rest at night. Sleeping with 1 or 2 pillows under your upper body and head may help you breathe easier.    Lifestyle changes    · Do not smoke. Smoking can make a heart condition worse. If you need help quitting, talk to your doctor about stop-smoking programs and medicines. These can increase your chances of quitting for good. Quitting smoking may be the most important step you can take to protect your heart.     · Limit alcohol to 2 drinks a day for men and 1 drink a day for women. Too much alcohol can cause health problems.     · Avoid getting sick from colds and the flu. Get a pneumococcal vaccine shot. If you have had one before, ask your doctor whether you need another dose. Get a flu shot each year. If you must be around people with colds or the flu, wash your hands often. When should you call for help? Call 911 if you have symptoms of sudden heart failure such as:    · You have severe trouble breathing.     · You cough up pink, foamy mucus.     · You have a new irregular or rapid heartbeat.    Call your doctor now or seek immediate medical care if:    · You have new or increased shortness of breath.     · You are dizzy or lightheaded, or you feel like you may faint.     · You have sudden weight gain, such as more than 2 to 3 pounds in a day or 5 pounds in a week. (Your doctor may suggest a different range of weight gain.)     · You have increased swelling in your legs, ankles, or feet.     · You are suddenly so tired or weak that you cannot do your usual activities.    Watch closely for changes in your health, and be sure to contact your doctor if you develop new symptoms. Where can you learn more?   Go to http://bal-rosa isela.info/. Enter L623 in the search box to learn more about \"Heart Failure: Care Instructions. \"  Current as of: July 22, 2018  Content Version: 11.9  © 3382-1493 [employer]. Care instructions adapted under license by [employer] (which disclaims liability or warranty for this information). If you have questions about a medical condition or this instruction, always ask your healthcare professional. [place] [address] any warranty or liability for your use of this information. Patient Education   Patient Education      Potassium Chloride (K-Dur, K-Sol, K-Tab, Terry Mur) - (By mouth)   Why this medicine is used:   Prevents and treats low potassium levels in the blood. Contact a nurse or doctor right away if you have:  · Uneven heartbeat, trouble breathing  · Confusion, weakness, numbness in your hands, feet, or lips  · Bloody or black, tarry stools     Common side effects:  · Mild nausea, diarrhea, gas  © 2017 [facility] Information is for End User's use only and may not be sold, redistributed or otherwise used for commercial purposes. Furosemide (By mouth)   Furosemide (tnzv-PY-nq-mide)  Treats fluid retention (edema) and high blood pressure. This medicine is a diuretic (water pill). Brand Name(s): Active-Medicated Specimen Collection Kit, Diuscreen Multi-Drug Medicated Test Kit, Lasix, RX-Specimen Collection Kit, Specimen Collection Kit   There may be other brand names for this medicine. When This Medicine Should Not Be Used: This medicine is not right for everyone. Do not use it if you had an allergic reaction to furosemide. How to Use This Medicine:   Liquid, Tablet  · Take your medicine as directed. Your dose may need to be changed several times to find what works best for you. · You may take this medicine with food if it upsets your stomach.   · Oral liquid: Measure the oral liquid medicine with a marked measuring spoon, oral syringe, or medicine cup. · Tablet: Swallow the tablet whole. Do not crush, break, or chew it. · Missed dose: Take a dose as soon as you remember. If it is almost time for your next dose, wait until then and take a regular dose. Do not take extra medicine to make up for a missed dose. · Store the medicine in a closed container at room temperature, away from heat, moisture, and direct light. Drugs and Foods to Avoid:   Ask your doctor or pharmacist before using any other medicine, including over-the-counter medicines, vitamins, and herbal products. · Some medicines can affect how furosemide works. Tell your doctor if you are also using any of the following:  ¨ Cisplatin, cyclosporine, digoxin, ethacrynic acid, licorice, lithium, methotrexate, or phenytoin  ¨ Adrenocorticotropic hormone (ACTH)  ¨ Laxative  ¨ Medicine to treat an infection  ¨ NSAID pain or arthritis medicine (including aspirin, diclofenac, ibuprofen, indomethacin, naproxen)  ¨ Other blood pressure medicines  ¨ Steroid medicine (including dexamethasone, hydrocortisone, methylprednisolone, prednisolone, prednisone)  ¨ Thyroid medicine  · If you also take sucralfate, allow at least 2 hours between the time you take furosemide and the time you take sucralfate. · Alcohol, narcotic pain medicine, or sleeping pills may cause you to feel more lightheaded, dizzy, or faint when used with this medicine. Warnings While Using This Medicine:   · Tell your doctor if you are pregnant or breastfeeding, or if you have kidney disease, liver disease (including cirrhosis), diabetes, gout, low blood pressure, lupus, an enlarged prostate, trouble urinating, or an allergy to sulfa drugs. Tell your doctor if you are on a low-salt diet.   · This medicine may cause the following problems:   ¨ Low levels of minerals in your blood, such as potassium and sodium  ¨ Blood sugar level changes  ¨ Hearing problems  · Make sure any doctor or dentist who treats you knows that you are using this medicine. · This medicine could lower your blood pressure too much, especially when you first use it or if you are dehydrated. Stand or sit up slowly if you feel lightheaded or dizzy. · This medicine may make your skin more sensitive to sunlight. Wear sunscreen. Do not use sunlamps or tanning beds. · Your doctor will do lab tests at regular visits to check on the effects of this medicine. Keep all appointments. · Keep all medicine out of the reach of children. Never share your medicine with anyone. Possible Side Effects While Using This Medicine:   Call your doctor right away if you notice any of these side effects:  · Allergic reaction: Itching or hives, swelling in your face or hands, swelling or tingling in your mouth or throat, chest tightness, trouble breathing  · Blistering, peeling, red skin rash  · Confusion, weakness, muscle twitching  · Dry mouth, increased thirst, muscle cramps, uneven heartbeat  · Sudden and severe stomach pain, nausea, vomiting, fever, lightheadedness  · Hearing loss, ringing in the ears  · Lightheadedness, dizziness, fainting  · Severe diarrhea  · Unusual bleeding or bruising  · Yellow skin or eyes  If you notice these less serious side effects, talk with your doctor:   · Loss of appetite, stomach cramps  If you notice other side effects that you think are caused by this medicine, tell your doctor. Call your doctor for medical advice about side effects. You may report side effects to FDA at 8-848-FDA-2182  © 2017 [facility] Information is for End User's use only and may not be sold, redistributed or otherwise used for commercial purposes. The above information is an  only. It is not intended as medical advice for individual conditions or treatments. Talk to your doctor, nurse or pharmacist before following any medical regimen to see if it is safe and effective for you.

## 2019-07-15 NOTE — PROGRESS NOTES
[facility] CARDIOLOGY PROGRESS NOTE           7/15/2019 7:27 AM    Admit Date: 7/12/2019      Subjective:   Feels much better. No SOB, no edema     ROS:  Cardiovascular:  As noted above    Objective:      Vitals:    07/14/19 1729 07/14/19 2030 07/15/19 0045 07/15/19 0448   BP: 133/79 109/68 102/65 109/61   Pulse: 76 72 72 74   Resp: 18 18 18 18   Temp: 98.1 °F (36.7 °C) 98.7 °F (37.1 °C) 97.3 °F (36.3 °C) 97.5 °F (36.4 °C)   SpO2: 100% 91% 92% 93%   Weight:    52.4 kg (115 lb 9.6 oz)       Physical Exam:  General-No Acute Distress  Neck- supple, no JVD  CV- irregular   Lung- clear bilaterally  Abd- soft, nontender, nondistended  Ext- no edema bilaterally. Skin- warm and dry    Data Review:   Recent Labs     07/15/19  0351 07/14/19  0343 07/13/19  0402    137 139   K 4.0 3.9 3.1*   MG 2.7* 2.7* 2.6*   BUN 33* 28* 31*   CREA 1.44* 1.30* 1.17*   GLU 95 89 74   WBC  --   --  7.3   HGB  --   --  9.7*   HCT  --   --  31.0*   PLT  --   --  405       Assessment/Plan:     Principal Problem:    Diastolic CHF, acute on chronic (HCC) (12/25/2017)  Euvolemic, continue current PO dose of lasix. Repeat BMP in am     Active Problems:    Atrial fibrillation (HCC) (7/13/2011)  Eliquis, rate controlled       CAD (coronary artery disease) (8/4/2011)  No cp       Hypertension, benign (10/29/2015)   controlled.        Edema (7/12/2019)  resolved      Heart failure ([address] (7/12/2019)            Dom Dsouza NP  7/15/2019 7:27 AM

## 2019-07-15 NOTE — PROGRESS NOTES
Care Management Interventions  PCP Verified by CM: Yes  Palliative Care Criteria Met (RRAT>21 & CHF Dx)?: No(RRAT 20 Dx CHF)  Mode of Transport at Discharge: Other (see comment)(daughter)  Transition of Care Consult (CM Consult): Discharge Planning  Discharge Durable Medical Equipment: No(Walker, Wheelchair, Shower chair)  Physical Therapy Consult: No  Occupational Therapy Consult: No  Speech Therapy Consult: No  Current Support Network: Lives Alone(Independent living at [facility])  Confirm Follow Up Transport: Family  Plan discussed with Pt/Family/Caregiver: Yes  Freedom of Choice Offered: Yes  Discharge Location  Discharge Placement: Home  Met with patient for d/c planning. Patient alert and oriented x 3, independent of ADL's and lives alone in the independent living at [facility]. DME includes walker, rollator, wheelchair and shower chair. She has transportation with family. She has Medicare and [employer]ARP for insurance and is able to obtain medications without difficulty. She voices no concerns or needs for d/c. Patient to return to [facility]. She declined home health. Her daughter is to provide transportation home today.

## 2019-07-15 NOTE — PROGRESS NOTES
Contacted the home phone number listed for the patient and spoke with the WANDA with the daughter, Christi Garay,  in the background. Mr. Saba Prado stated that he and Mrs. Saba Prado thought the program would be of help. They suggested that I call the patient back on her cell phone and set up an in home visit. I did call back at 1330 hours and left a voicemail message but have yet to hear back from the patient. Will re-attempt to reach the patient tomorrow.

## 2019-07-15 NOTE — PROGRESS NOTES
Problem: Falls - Risk of  Goal: *Absence of Falls  Description  Document [employer] Payment Fall Risk and appropriate interventions in the flowsheet. Outcome: Progressing Towards Goal   Pt has bed alarm on and active. Problem: Patient Education: Go to Patient Education Activity  Goal: Patient/Family Education  Outcome: Progressing Towards Goal     Problem: Fluid Volume - Risk of, Imbalanced  Goal: *Balanced intake and output  Outcome: Progressing Towards Goal   BLE improved edema. RLE remains with trace swelling but is not red like previous shift.   Problem: Patient Education: Go to Patient Education Activity  Goal: Patient/Family Education  Outcome: Progressing Towards Goal

## 2019-07-15 NOTE — DISCHARGE SUMMARY
[facility] Cardiology Discharge Summary     Patient ID:  Bishop aCno  [id number]  21 y.o.  5/1/1931    Admit date: 7/12/2019    Discharge date:  7/15/2019    Admitting Physician: Brian Newell MD     Discharge Physician: Dr. Hema Hong. Nat Vargasa    Admission Diagnoses: Heart failure ([address] [I50.9]    Discharge Diagnoses:    Diagnosis    Edema    Left bundle branch block    Diastolic CHF, acute on chronic     S/P CABG (coronary artery bypass graft)    Paroxysmal atrial fibrillation     Hypertension, benign    Pleural effusion, right    Hypothyroidism    Coronary atherosclerosis of native coronary artery--PTCA 1997 LAD/diag    Mitral valve insufficiency    Hyperlipemia       Cardiology Procedures this admission:  EchoCardiogram  Consults: None    Hospital Course: Patient presented [facility] in transfer after presenting to [facility] ED with complaints of mild chest discomfort, progressive shortness of breath, weight gain, orthopnea and PND. At [facility], initial w/u with . She was treated with ASA, IV Lasix and NTG paste. She was admitted to [facility] for further cardiac evaluation and treatment. She was in rate-controlled  AFib on arrival. Her BB was continued. She was started on IV Lasix for diuresis. She diuresed well and felt better. An echocardiogram was performed with report as follows:     SUMMARY:  -  Left ventricle: Systolic function was normal. Ejection fraction was  estimated in the range of 55 % to 60 %. There were no regional wall motion  abnormalities. -  Right ventricle: The size was at the upper limits of normal. There was   Mild to moderate pulmonary artery hypertension.  -  Left atrium: The atrium was moderately to markedly dilated. -  Right atrium: The atrium was moderately to markedly dilated. -  Inferior vena cava, hepatic veins: The respirophasic change in diameter   Was more than 50%. -  Aortic valve: The valve was trileaflet. Leaflets exhibited mild sclerosis.   There was mild regurgitation. -  Mitral valve: There was mild annular calcification. There was moderate to  severe regurgitation. -  Tricuspid valve: There was moderate to severe regurgitation. -  Pulmonic valve: There was moderate regurgitation. The morning of 7/15/2019 patient was up feeling well without any complaints of shortness of breath. LE edema was improved. Patient's labs were stable with creatinine of 1.44. Patient was seen and examined by Dr. Steve Marte. Beena Zheng and determined stable and ready for discharge. Patient was instructed on the importance of medication compliance, low sodium diet, 2 liter per day fluid restriction and daily weights. For maximized medical therapy of congestive heart failure, patient will continue use of BB but no ACEi/ARB 2/2 renal function. The patient will have close transitional care follow up with [address] [place] [address] [address]Ned Beena Zheng in one week. DISPOSITION: The patient is being discharged home in stable condition on a low saturated fat, low cholesterol and low salt diet. The patient is instructed to advance activities as tolerated to the limit of fatigue or shortness of breath. The patient is informed to monitor daily weights and maintain a 2 liter per day fluid restriction. The patient is instructed to call the office for any shortness of breath, weight gain, or increased peripheral edema. Discharge Exam:   Visit Vitals  /68 (BP 1 Location: Left arm, BP Patient Position: At rest)   Pulse 72   Temp 98.7 °F (37.1 °C)   Resp 18   Wt 52.3 kg (115 lb 3.2 oz)   SpO2 91%   BMI 22.50 kg/m²       Patient has been seen by Dr. Steve Marte. Beena Zheng: see his progress note for exam details.     Recent Results (from the past 24 hour(s))   METABOLIC PANEL, BASIC    Collection Time: 07/14/19  3:43 AM   Result Value Ref Range    Sodium 137 136 - 145 mmol/L    Potassium 3.9 3.5 - 5.1 mmol/L    Chloride 100 98 - 107 mmol/L    CO2 28 21 - 32 mmol/L    Anion gap 9 7 - 16 mmol/L    Glucose 89 65 - 100 mg/dL    BUN 28 (H) 8 - 23 MG/DL    Creatinine 1.30 (H) 0.6 - 1.0 MG/DL    GFR est AA 50 (L) >60 ml/min/1.73m2    GFR est non-AA 41 (L) >60 ml/min/1.73m2    Calcium 9.5 8.3 - 10.4 MG/DL   MAGNESIUM    Collection Time: 07/14/19  3:43 AM   Result Value Ref Range    Magnesium 2.7 (H) 1.8 - 2.4 mg/dL   BNP    Collection Time: 07/14/19  3:43 AM   Result Value Ref Range     (H) 0 pg/mL         Patient Instructions:   Current Discharge Medication List      START taking these medications    Details   potassium chloride (K-DUR, KLOR-CON) 20 mEq tablet Take 1 Tab by mouth two (2) times a day. Qty: 30 Tab, Refills: 6         CONTINUE these medications which have CHANGED    Details   furosemide (LASIX) 40 mg tablet Take 1 Tab by mouth daily. Qty: 30 Tab, Refills: 6         CONTINUE these medications which have NOT CHANGED    Details   diclofenac (VOLTAREN) 1 % gel APPLY TOPICALLY 4 (FOUR) TIMES A DAY. 100 GRAM PER AFFECTED AREA  Refills: 3      apixaban (ELIQUIS) 2.5 mg tablet Take 1 Tab by mouth two (2) times a day. Qty: 60 Tab, Refills: 5      gabapentin (NEURONTIN) 300 mg capsule Take 300 mg by mouth two (2) times a day. buprenorphine (BUTRANS) 10 mcg/hour 1 Patch by TransDERmal route every seven (7) days. nitroglycerin (NITROSTAT) 0.4 mg SL tablet 1 Tab by SubLINGual route every five (5) minutes as needed for Chest Pain. Qty: 1 Bottle, Refills: 5      levothyroxine (SYNTHROID) 112 mcg tablet Take 112 mcg by mouth Daily (before breakfast). metoprolol succinate (TOPROL-XL) 25 mg XL tablet Take  by mouth daily. pantoprazole (PROTONIX) 40 mg tablet Take 1 Tab by mouth Daily (before breakfast). Qty: 30 Tab, Refills: 6      rosuvastatin (CRESTOR) 40 mg tablet Take 40 mg by mouth nightly. DULoxetine (CYMBALTA) 60 mg capsule Take 60 mg by mouth daily.       Calcium Carbonate-Vit D3-Min (CALTRATE 600+D PLUS MINERALS) 600 mg calcium- 400 unit tab Take  by mouth.      gemfibrozil (LOPID) 600 mg tablet Take 600 mg by mouth two (2) times a day. ferrous sulfate 140 mg (45 mg iron) TbER ER tablet Take 140 mg by mouth. Take 1 tab by mouth every morning and bedtime. rOPINIRole (REQUIP) 1 mg tablet Take 1 mg by mouth three (3) times daily. 12pm - 3pm -9pm      dextran 70-hypromellose (ARTIFICIAL TEARS) ophthalmic solution Administer 2 drops to both eyes as needed. ascorbic acid (VITAMIN C) 500 mg tablet Take 500 mg by mouth daily. timolol maleate 0.5 % drpd ophthalmic solution Administer 1 drop to both eyes daily. 9 am      latanoprost (XALATAN) 0.005 % ophthalmic solution Administer 1 Drop to both eyes nightly.          STOP taking these medications       amiodarone (CORDARONE) 200 mg tablet Comments:   Reason for Stopping:         amLODIPine (NORVASC) 5 mg tablet Comments:   Reason for Stopping:

## 2019-07-15 NOTE — PROGRESS NOTES
Bedside and Verbal shift change report given to Kemar Sung RN (oncoming nurse) by self (offgoing nurse). Report included the following information SBAR, Kardex, Intake/Output, MAR, Recent Results and Cardiac Rhythm Afib.

## 2019-07-15 NOTE — ROUTINE PROCESS
CHF teaching completed, verbalize emphasis on monitoring self and report to MD: 
? If you gain 2 lbs in one day or 5 lbs in a week, and short of breath. ? If you can not lay flat without developing short of breath or rapid breathing at night; or if it wakes you up. Develop a cough or wheezing. ? If you notice swollen hands/feet/ankles or stomach with a bloated/ full feeling. ? If you are  more confused or mentally fuzzy or dizzy. ? If you notice a rapid or change in your heart rate. ? If you become more exhausted all the time and unable to do the same level of activity without stopping to catch your breath. Drink no more than 8 cups a day in 8 oz. cups. Limit Cola Drinks. Your Heart can not handle any more. Stay away from salt (limit anything with salt or sodium in it). Limit to 250mg per serving. Exercise needs to be started with your Doctors approval. 
Reduce stress; Call myself or Provider if assistance is needed. Pass post test via teach back, will make self available post DC ,if an questions arise. Diabetic teaching completed.  Planner/scale @ BS:  60 mins total

## 2019-07-15 NOTE — PROGRESS NOTES
Discharge instructions reviewed with patient. Prescriptions given for lasix, potassium chloride, metabolic panel recheck and med info sheets provided for all new medications. Opportunity for questions provided. Patient voiced understanding of all discharge instructions. IV and heart monitor removed from pt.   Patient's daughter called and en route to transport pt back to independent living at [facility]

## 2019-07-22 ENCOUNTER — HOSPITAL ENCOUNTER (OUTPATIENT)
Dept: LAB | Age: 84
Discharge: HOME | End: 2019-07-22
Attending: INTERNAL MEDICINE
Payer: MEDICARE

## 2019-07-22 LAB
ANION GAP SERPL CALC-SCNC: 7 MMOL/L (ref 7–16)
BUN SERPL-MCNC: 34 MG/DL (ref 8–23)
CALCIUM SERPL-MCNC: 10.4 MG/DL (ref 8.3–10.4)
CHLORIDE SERPL-SCNC: 106 MMOL/L (ref 98–107)
CO2 SERPL-SCNC: 27 MMOL/L (ref 21–32)
CREAT SERPL-MCNC: 1.5 MG/DL (ref 0.6–1)
GLUCOSE SERPL-MCNC: 78 MG/DL (ref 65–100)
POTASSIUM SERPL-SCNC: 4.4 MMOL/L (ref 3.5–5.1)
SODIUM SERPL-SCNC: 140 MMOL/L (ref 136–145)

## 2019-07-22 PROCEDURE — 80048 BASIC METABOLIC PNL TOTAL CA: CPT

## 2019-07-22 PROCEDURE — 36415 COLL VENOUS BLD VENIPUNCTURE: CPT

## 2019-08-15 ENCOUNTER — PATIENT OUTREACH (OUTPATIENT)
Dept: CASE MANAGEMENT | Age: 84
End: 2019-08-15

## 2019-08-15 NOTE — PROGRESS NOTES
Spoke with the patients daughter who advised that the patient was well without any complaints, had seen her internist recently who gave a good report. Encouraged the daughter to maintain low sodium diet, fluid restrictions and to contact cardiology early for any issues that may arise. The daughter did agree to continued follow up calls.

## 2019-08-30 ENCOUNTER — HOSPITAL ENCOUNTER (OUTPATIENT)
Dept: LAB | Age: 84
Discharge: HOME | End: 2019-08-30
Payer: MEDICARE

## 2019-08-30 DIAGNOSIS — I50.31 ACUTE DIASTOLIC HEART FAILURE (HCC): ICD-10-CM

## 2019-08-30 LAB
ANION GAP SERPL CALC-SCNC: 4 MMOL/L (ref 7–16)
BUN SERPL-MCNC: 20 MG/DL (ref 8–23)
CALCIUM SERPL-MCNC: 10 MG/DL (ref 8.3–10.4)
CHLORIDE SERPL-SCNC: 106 MMOL/L (ref 98–107)
CO2 SERPL-SCNC: 27 MMOL/L (ref 21–32)
CREAT SERPL-MCNC: 1.3 MG/DL (ref 0.6–1)
GLUCOSE SERPL-MCNC: 105 MG/DL (ref 65–100)
POTASSIUM SERPL-SCNC: 4.5 MMOL/L (ref 3.5–5.1)
SODIUM SERPL-SCNC: 137 MMOL/L (ref 136–145)

## 2019-08-30 PROCEDURE — 80048 BASIC METABOLIC PNL TOTAL CA: CPT

## 2019-08-30 PROCEDURE — 36415 COLL VENOUS BLD VENIPUNCTURE: CPT

## 2019-11-11 ENCOUNTER — PATIENT OUTREACH (OUTPATIENT)
Dept: CASE MANAGEMENT | Age: 84
End: 2019-11-11

## 2019-11-11 NOTE — PROGRESS NOTES
Community Care Team documentation for patient in [facility]    The information below provided by:[facility]  PT Update: Sup bed mobility; SBA transfers and Amb 200 ft RW; Sup ADL's        Nursing Update:S/P Hip fx. sutures removed, O2 dc'd, Labs WNL.       Discharge Date:11/12/19 [facility]      Assign to [facility] Manager:N/A

## 2019-12-04 PROBLEM — E83.52 HYPERCALCEMIA: Status: ACTIVE | Noted: 2019-12-04

## 2019-12-04 PROBLEM — R79.89 ELEVATED PTHRP LEVEL: Status: ACTIVE | Noted: 2019-12-04

## 2021-05-11 NOTE — ROUTINE PROCESS
CHF teaching started post introduction to pt/family; aware of diagnosis. Planner/scale @ BS and will follow. Smoking/ ETOH/Illicit drug use cessation covered. Pt/family aware that I can not prescribe nor adjust  medications: 15mins  Start 2 L/ D FR  Palliative Care score; RATT, ACP on file  CHF teaching continues to pt/family. Emphasis on taking prescription meds as ordered, to keep F/U appts and to call MD STAT if any of the following occur:   If you gain 2 lbs in one day or 5 lbs in a week, and short of breath.  If you can not lay flat without developing short of breath or rapid breathing at night; or if it wakes you up. Develop a cough or wheezing.     PAOLO: vika:  [employer] diet is Cardiac, Nursing / Providers available to pt if wishes: pt/daughter aware Wrong pt

## 2021-08-07 ENCOUNTER — APPOINTMENT (OUTPATIENT)
Dept: CT IMAGING | Age: 86
End: 2021-08-07
Attending: EMERGENCY MEDICINE
Payer: MEDICARE

## 2021-08-07 PROCEDURE — 96374 THER/PROPH/DIAG INJ IV PUSH: CPT

## 2021-08-07 PROCEDURE — 70450 CT HEAD/BRAIN W/O DYE: CPT

## 2021-08-07 PROCEDURE — 99284 EMERGENCY DEPT VISIT MOD MDM: CPT

## 2021-08-07 PROCEDURE — 72125 CT NECK SPINE W/O DYE: CPT

## 2021-08-08 ENCOUNTER — APPOINTMENT (OUTPATIENT)
Dept: GENERAL RADIOLOGY | Age: 86
End: 2021-08-08
Payer: MEDICARE

## 2021-08-08 ENCOUNTER — HOSPITAL ENCOUNTER (EMERGENCY)
Age: 86
Discharge: HOME | End: 2021-08-08
Payer: MEDICARE

## 2021-08-08 VITALS
RESPIRATION RATE: 20 BRPM | BODY MASS INDEX: 23.43 KG/M2 | SYSTOLIC BLOOD PRESSURE: 150 MMHG | HEART RATE: 71 BPM | HEIGHT: 61 IN | TEMPERATURE: 97.7 F | OXYGEN SATURATION: 95 % | DIASTOLIC BLOOD PRESSURE: 67 MMHG
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
## 2021-08-08 DIAGNOSIS — S80.811A ABRASION OF ANTERIOR RIGHT LOWER LEG, INITIAL ENCOUNTER: ICD-10-CM

## 2021-08-08 LAB
ALBUMIN SERPL-MCNC: 3.8 G/DL (ref 3.2–4.6)
ALBUMIN/GLOB SERPL: 0.8 {RATIO} (ref 1.2–3.5)
ALP SERPL-CCNC: 170 U/L (ref 50–136)
ALT SERPL-CCNC: 18 U/L (ref 12–65)
ANION GAP SERPL CALC-SCNC: 6 MMOL/L (ref 7–16)
AST SERPL-CCNC: 26 U/L (ref 15–37)
BASOPHILS # BLD: 0.1 K/UL (ref 0–0.2)
BASOPHILS NFR BLD: 1 % (ref 0–2)
BILIRUB SERPL-MCNC: 0.4 MG/DL (ref 0.2–1.1)
BUN SERPL-MCNC: 34 MG/DL (ref 8–23)
CALCIUM SERPL-MCNC: 10.5 MG/DL (ref 8.3–10.4)
CHLORIDE SERPL-SCNC: 110 MMOL/L (ref 98–107)
CO2 SERPL-SCNC: 25 MMOL/L (ref 21–32)
CREAT SERPL-MCNC: 0.93 MG/DL (ref 0.6–1)
DIFFERENTIAL METHOD BLD: ABNORMAL
EOSINOPHIL # BLD: 0.2 K/UL (ref 0–0.8)
EOSINOPHIL NFR BLD: 2 % (ref 0.5–7.8)
ERYTHROCYTE [DISTWIDTH] IN BLOOD BY AUTOMATED COUNT: 14.7 % (ref 11.9–14.6)
GLOBULIN SER CALC-MCNC: 4.5 G/DL (ref 2.3–3.5)
GLUCOSE SERPL-MCNC: 101 MG/DL (ref 65–100)
HCT VFR BLD AUTO: 38.8 % (ref 35.8–46.3)
HGB BLD-MCNC: 12.1 G/DL (ref 11.7–15.4)
IMM GRANULOCYTES # BLD AUTO: 0.1 K/UL (ref 0–0.5)
IMM GRANULOCYTES NFR BLD AUTO: 1 % (ref 0–5)
LYMPHOCYTES # BLD: 1.3 K/UL (ref 0.5–4.6)
LYMPHOCYTES NFR BLD: 13 % (ref 13–44)
MCH RBC QN AUTO: 31.8 PG (ref 26.1–32.9)
MCHC RBC AUTO-ENTMCNC: 31.2 G/DL (ref 31.4–35)
MCV RBC AUTO: 101.8 FL (ref 79.6–97.8)
MONOCYTES # BLD: 1 K/UL (ref 0.1–1.3)
MONOCYTES NFR BLD: 10 % (ref 4–12)
NEUTS SEG # BLD: 7.5 K/UL (ref 1.7–8.2)
NEUTS SEG NFR BLD: 74 % (ref 43–78)
NRBC # BLD: 0 K/UL (ref 0–0.2)
PLATELET # BLD AUTO: 268 K/UL (ref 150–450)
PMV BLD AUTO: 10 FL (ref 9.4–12.3)
POTASSIUM SERPL-SCNC: 5.1 MMOL/L (ref 3.5–5.1)
PROT SERPL-MCNC: 8.3 G/DL (ref 6.3–8.2)
RBC # BLD AUTO: 3.81 M/UL (ref 4.05–5.2)
SODIUM SERPL-SCNC: 141 MMOL/L (ref 136–145)
WBC # BLD AUTO: 10.1 K/UL (ref 4.3–11.1)

## 2021-08-08 PROCEDURE — 85025 COMPLETE CBC W/AUTO DIFF WBC: CPT

## 2021-08-08 PROCEDURE — 72170 X-RAY EXAM OF PELVIS: CPT

## 2021-08-08 PROCEDURE — 80053 COMPREHEN METABOLIC PANEL: CPT

## 2021-08-08 PROCEDURE — 74011000250 HC RX REV CODE- 250

## 2021-08-08 RX ORDER — LABETALOL HYDROCHLORIDE 5 MG/ML
20 INJECTION, SOLUTION INTRAVENOUS ONCE
Status: COMPLETED | OUTPATIENT
Start: 2021-08-08 | End: 2021-08-08

## 2021-08-08 RX ADMIN — LABETALOL HYDROCHLORIDE 20 MG: 5 INJECTION INTRAVENOUS at 00:49

## 2021-08-08 NOTE — ED NOTES
On departure, pt casually discussed how important it was to her that she be documented as a DNR. Verbal re-confirmation given.

## 2021-08-08 NOTE — ED PROVIDER NOTES
25-year-old female fall. Patient is on anticoagulation therapy. Patient loses her balance and her legs give out on her frequently and she falls according to the son. Patient fell to her knees today has some minor hip discomfort and abrasion to her right lower pretibial area. Son also states that she is very unstable on her feet but occasionally does not use her walker which is what occurred tonight. The history is provided by the patient. Fall  The accident occurred less than 1 hour ago. The fall occurred while standing. She fell from a height of ground level. She landed on hard floor. The pain is moderate. Associated symptoms include laceration. Pertinent negatives include no loss of consciousness.         Past Medical History:   Diagnosis Date    Anemia associated with acute blood loss 7/24/2011    Atrial fibrillation ([address] 7/13/2011    CAD (coronary artery disease) 1997    MI, PCI , CABG 7/13/2011    Chronic diastolic heart failure ([address] 10/29/2015    Chronic pain     Constipation- no BM since 7/30 8/5/2011    Coronary atherosclerosis of native coronary artery--PTCA 1997 LAD/diag 7/1/2011    CVA (cerebrovascular accident) ([address] 3/29/2009    mild memory issues, rls, afib    Diastolic CHF, acute on chronic ([address] 12/25/2017    GI bleed 1/11/2015    HEMOTHORAX ON LEFT  7/24/2011    Hyperlipemia 3/29/2009    Hypertension 3/29/2009    Hypertension, benign 10/29/2015    Hyponatremia 7/24/2011    Hypothyroidism 7/7/2011    Leucocytosis 8/3/2011    Leukocytosis 1/11/2015    Irene-Starr tear 1/12/2015    Mitral valve insufficiency 4/2/2009    Paroxysmal atrial fibrillation ([address] 10/29/2015    Pleural effusion, bilateral 7/23/2011    Right thorocetesis 1200ml removed 7/23/11 and 1000ml removed 7/28/11 Left thorocentesis 1100ml removed 7/23/11     Pneumonia due to methicillin resistant Staphylococcus aureus ([address] 1/17/2019    S/p thoracotomy- evauation of hemothorax 8/4/2011    Unspecified hypothyroidism 3/30/2009       Past Surgical History:   Procedure Laterality Date    HX BACK SURGERY      HX ORTHOPAEDIC  4 years ago    neck    HX ORTHOPAEDIC  10 years    back    HX OTHER SURGICAL      steroid injection neck, 2 weeks ago    ND CABG, ARTERY-VEIN, THREE  2011         Family History:   Problem Relation Age of Onset    Heart Attack Father 36       Social History     Socioeconomic History    Marital status:      Spouse name: Not on file    Number of children: Not on file    Years of education: Not on file    Highest education level: Not on file   Occupational History    Not on file   Tobacco Use    Smoking status: Former Smoker     Quit date:      Years since quittin.6    Smokeless tobacco: Never Used   Substance and Sexual Activity    Alcohol use: No    Drug use: No    Sexual activity: Never   Other Topics Concern    Not on file   Social History Narrative    Not on file     Social Determinants of Health     Financial Resource Strain:     Difficulty of Paying Living Expenses:    Food Insecurity:     Worried About Running Out of Food in the Last Year:     [address] in the Last Year:    Transportation Needs:     Lack of Transportation (Medical):  Lack of Transportation (Non-Medical):    Physical Activity:     Days of Exercise per Week:     Minutes of Exercise per Session:    Stress:     Feeling of Stress :    Social Connections:     Frequency of Communication with Friends and Family:     Frequency of Social Gatherings with Friends and Family:     Attends Gnosticism Services:     Active Member of Clubs or Organizations:     Attends Club or Organization Meetings:     Marital Status:    Intimate Partner Violence:     Fear of Current or Ex-Partner:     Emotionally Abused:     Physically Abused:     Sexually Abused: ALLERGIES: Versed [midazolam]    Review of Systems   Constitutional: Negative. Negative for activity change. HENT: Negative. Eyes: Negative. Respiratory: Negative. Cardiovascular: Negative. Gastrointestinal: Negative. Genitourinary: Negative. Musculoskeletal: Negative. Skin: Negative. Neurological: Negative. Negative for loss of consciousness. Psychiatric/Behavioral: Negative. All other systems reviewed and are negative. Vitals:    08/08/21 0102 08/08/21 0105 08/08/21 0111 08/08/21 0121   BP:   (!) 141/68 138/67   Pulse:  71     Resp:       Temp:       SpO2: 95%      Height:                Physical Exam  Vitals and nursing note reviewed. Constitutional:       General: She is not in acute distress. Appearance: She is well-developed. HENT:      Head: Normocephalic and atraumatic. Right Ear: External ear normal.      Left Ear: External ear normal.      Nose: Nose normal.   Eyes:      General: No scleral icterus. Right eye: No discharge. Left eye: No discharge. Conjunctiva/sclera: Conjunctivae normal.      Pupils: Pupils are equal, round, and reactive to light. Cardiovascular:      Rate and Rhythm: Regular rhythm. Pulmonary:      Effort: Pulmonary effort is normal. No respiratory distress. Breath sounds: Normal breath sounds. No stridor. No wheezing or rales. Abdominal:      General: Bowel sounds are normal. There is no distension. Palpations: Abdomen is soft. Tenderness: There is no abdominal tenderness. Musculoskeletal:         General: Normal range of motion. Cervical back: Normal range of motion. Skin:     General: Skin is warm and dry. Findings: Laceration present. No rash. Neurological:      Mental Status: She is alert and oriented to person, place, and time. Motor: No abnormal muscle tone.       Coordination: Coordination normal.   Psychiatric:         Behavior: Behavior normal.          MDM  Number of Diagnoses or Management Options  Diagnosis management comments: Patient was found to be hypertensive in the emergency department this was treated successfully of blood pressure returned to a safe level. Skin tear was repaired with Steri-Strips and glue dressing was placed x-rays were negative CT head and neck were also negative. Patient to follow-up closely with her primary care doctor user walker at all times.        Amount and/or Complexity of Data Reviewed  Clinical lab tests: ordered and reviewed  Tests in the radiology section of CPT®: reviewed and ordered  Tests in the medicine section of CPT®: ordered and reviewed           Procedures

## 2021-08-08 NOTE — ED NOTES
I have reviewed discharge instructions with the patient and caregiver. The patient and caregiver verbalized understanding. Patient left ED via Discharge Method: wheelchair to Home with son. Opportunity for questions and clarification provided. Patient given 0 scripts. To continue your aftercare when you leave the hospital, you may receive an automated call from our care team to check in on how you are doing.  This is a free service and part of our promise to provide the best care and service to meet your aftercare needs. \" If you have questions, or wish to unsubscribe from this service please call [phone].  Thank you for Choosing our [address] [place] [address] Emergency Department.

## 2021-08-08 NOTE — ED TRIAGE NOTES
EMS called to her nursing home for a fall from standing. Pt. States that he legs gave out. Skin tear bandaged on R shin. C Collar in place. Pt. States that her legs hurt all the time, L leg currently worse than right- with more soreness now after fall.

## 2021-10-31 ENCOUNTER — APPOINTMENT (OUTPATIENT)
Dept: GENERAL RADIOLOGY | Age: 86
DRG: 291 | End: 2021-10-31
Attending: EMERGENCY MEDICINE
Payer: MEDICARE

## 2021-10-31 ENCOUNTER — HOSPITAL ENCOUNTER (INPATIENT)
Age: 86
LOS: 5 days | Discharge: HOME | DRG: 291 | End: 2021-11-05
Attending: EMERGENCY MEDICINE | Admitting: HOSPITALIST
Payer: MEDICARE

## 2021-10-31 ENCOUNTER — APPOINTMENT (OUTPATIENT)
Dept: ULTRASOUND IMAGING | Age: 86
DRG: 291 | End: 2021-10-31
Attending: HOSPITALIST
Payer: MEDICARE

## 2021-10-31 DIAGNOSIS — R07.9 CHEST PAIN, UNSPECIFIED TYPE: ICD-10-CM

## 2021-10-31 DIAGNOSIS — J96.01 ACUTE HYPOXEMIC RESPIRATORY FAILURE (HCC): ICD-10-CM

## 2021-10-31 DIAGNOSIS — J81.0 ACUTE PULMONARY EDEMA (HCC): Primary | ICD-10-CM

## 2021-10-31 DIAGNOSIS — I48.21 PERMANENT ATRIAL FIBRILLATION (HCC): ICD-10-CM

## 2021-10-31 DIAGNOSIS — I25.10 ATHEROSCLEROSIS OF NATIVE CORONARY ARTERY OF NATIVE HEART WITHOUT ANGINA PECTORIS: Chronic | ICD-10-CM

## 2021-10-31 DIAGNOSIS — I50.33 DIASTOLIC CHF, ACUTE ON CHRONIC (HCC): ICD-10-CM

## 2021-10-31 DIAGNOSIS — I50.9 ACUTE ON CHRONIC CONGESTIVE HEART FAILURE, UNSPECIFIED HEART FAILURE TYPE (HCC): ICD-10-CM

## 2021-10-31 DIAGNOSIS — J90 PLEURAL EFFUSION ON RIGHT: ICD-10-CM

## 2021-10-31 DIAGNOSIS — R07.2 PRECORDIAL PAIN: ICD-10-CM

## 2021-10-31 DIAGNOSIS — E78.2 MIXED HYPERLIPIDEMIA: Chronic | ICD-10-CM

## 2021-10-31 DIAGNOSIS — I34.0 NONRHEUMATIC MITRAL VALVE REGURGITATION: Chronic | ICD-10-CM

## 2021-10-31 PROBLEM — L03.115 CELLULITIS OF RIGHT LEG: Status: ACTIVE | Noted: 2021-10-31

## 2021-10-31 LAB
ALBUMIN SERPL-MCNC: 3.1 G/DL (ref 3.2–4.6)
ALBUMIN/GLOB SERPL: 0.8 {RATIO} (ref 1.2–3.5)
ALP SERPL-CCNC: 150 U/L (ref 50–136)
ALT SERPL-CCNC: 14 U/L (ref 12–65)
ANION GAP SERPL CALC-SCNC: 3 MMOL/L (ref 7–16)
ARTERIAL PATENCY WRIST A: POSITIVE
AST SERPL-CCNC: 17 U/L (ref 15–37)
ATRIAL RATE: 122 BPM
BASE EXCESS BLD CALC-SCNC: 0.1 MMOL/L
BASOPHILS # BLD: 0 K/UL (ref 0–0.2)
BASOPHILS NFR BLD: 1 % (ref 0–2)
BDY SITE: ABNORMAL
BILIRUB SERPL-MCNC: 0.4 MG/DL (ref 0.2–1.1)
BNP SERPL-MCNC: 2615 PG/ML
BUN SERPL-MCNC: 28 MG/DL (ref 8–23)
CALCIUM SERPL-MCNC: 10 MG/DL (ref 8.3–10.4)
CALCULATED R AXIS, ECG10: 59 DEGREES
CALCULATED T AXIS, ECG11: 84 DEGREES
CHLORIDE SERPL-SCNC: 109 MMOL/L (ref 98–107)
CO2 BLD-SCNC: 25 MMOL/L (ref 13–23)
CO2 SERPL-SCNC: 27 MMOL/L (ref 21–32)
COVID-19 RAPID TEST, COVR: NOT DETECTED
CREAT SERPL-MCNC: 1.16 MG/DL (ref 0.6–1)
DIAGNOSIS, 93000: NORMAL
DIFFERENTIAL METHOD BLD: ABNORMAL
EOSINOPHIL # BLD: 0.2 K/UL (ref 0–0.8)
EOSINOPHIL NFR BLD: 3 % (ref 0.5–7.8)
ERYTHROCYTE [DISTWIDTH] IN BLOOD BY AUTOMATED COUNT: 14.7 % (ref 11.9–14.6)
GAS FLOW.O2 O2 DELIVERY SYS: ABNORMAL L/MIN
GLOBULIN SER CALC-MCNC: 4.1 G/DL (ref 2.3–3.5)
GLUCOSE SERPL-MCNC: 163 MG/DL (ref 65–100)
HCO3 BLD-SCNC: 24.7 MMOL/L (ref 22–26)
HCT VFR BLD AUTO: 32.9 % (ref 35.8–46.3)
HGB BLD-MCNC: 10.4 G/DL (ref 11.7–15.4)
IMM GRANULOCYTES # BLD AUTO: 0.1 K/UL (ref 0–0.5)
IMM GRANULOCYTES NFR BLD AUTO: 1 % (ref 0–5)
LIPASE SERPL-CCNC: 109 U/L (ref 73–393)
LYMPHOCYTES # BLD: 1.3 K/UL (ref 0.5–4.6)
LYMPHOCYTES NFR BLD: 18 % (ref 13–44)
MCH RBC QN AUTO: 32.9 PG (ref 26.1–32.9)
MCHC RBC AUTO-ENTMCNC: 31.6 G/DL (ref 31.4–35)
MCV RBC AUTO: 104.1 FL (ref 79.6–97.8)
MONOCYTES # BLD: 1 K/UL (ref 0.1–1.3)
MONOCYTES NFR BLD: 14 % (ref 4–12)
NEUTS SEG # BLD: 4.4 K/UL (ref 1.7–8.2)
NEUTS SEG NFR BLD: 64 % (ref 43–78)
NRBC # BLD: 0 K/UL (ref 0–0.2)
PCO2 BLD: 39 MMHG (ref 35–45)
PH BLD: 7.41 [PH] (ref 7.35–7.45)
PLATELET # BLD AUTO: 262 K/UL (ref 150–450)
PMV BLD AUTO: 10.2 FL (ref 9.4–12.3)
PO2 BLD: 52 MMHG (ref 75–100)
POTASSIUM SERPL-SCNC: 4.2 MMOL/L (ref 3.5–5.1)
PROT SERPL-MCNC: 7.2 G/DL (ref 6.3–8.2)
Q-T INTERVAL, ECG07: 402 MS
QRS DURATION, ECG06: 112 MS
QTC CALCULATION (BEZET), ECG08: 411 MS
RBC # BLD AUTO: 3.16 M/UL (ref 4.05–5.2)
SAO2 % BLD: 87 % (ref 95–98)
SERVICE CMNT-IMP: ABNORMAL
SODIUM SERPL-SCNC: 139 MMOL/L (ref 136–145)
SOURCE, COVRS: NORMAL
SPECIMEN TYPE: ABNORMAL
TROPONIN-HIGH SENSITIVITY: 12.1 PG/ML (ref 0–14)
TROPONIN-HIGH SENSITIVITY: 12.4 PG/ML (ref 0–14)
VENTRICULAR RATE, ECG03: 63 BPM
WBC # BLD AUTO: 6.9 K/UL (ref 4.3–11.1)

## 2021-10-31 PROCEDURE — 36600 WITHDRAWAL OF ARTERIAL BLOOD: CPT

## 2021-10-31 PROCEDURE — 82803 BLOOD GASES ANY COMBINATION: CPT

## 2021-10-31 PROCEDURE — 87635 SARS-COV-2 COVID-19 AMP PRB: CPT

## 2021-10-31 PROCEDURE — 93971 EXTREMITY STUDY: CPT

## 2021-10-31 PROCEDURE — 74011250636 HC RX REV CODE- 250/636: Performed by: EMERGENCY MEDICINE

## 2021-10-31 PROCEDURE — 80053 COMPREHEN METABOLIC PANEL: CPT

## 2021-10-31 PROCEDURE — 83690 ASSAY OF LIPASE: CPT

## 2021-10-31 PROCEDURE — 85025 COMPLETE CBC W/AUTO DIFF WBC: CPT

## 2021-10-31 PROCEDURE — 71045 X-RAY EXAM CHEST 1 VIEW: CPT

## 2021-10-31 PROCEDURE — 84484 ASSAY OF TROPONIN QUANT: CPT

## 2021-10-31 PROCEDURE — 74011250637 HC RX REV CODE- 250/637: Performed by: HOSPITALIST

## 2021-10-31 PROCEDURE — 96374 THER/PROPH/DIAG INJ IV PUSH: CPT

## 2021-10-31 PROCEDURE — 93005 ELECTROCARDIOGRAM TRACING: CPT | Performed by: EMERGENCY MEDICINE

## 2021-10-31 PROCEDURE — 99285 EMERGENCY DEPT VISIT HI MDM: CPT

## 2021-10-31 PROCEDURE — 83880 ASSAY OF NATRIURETIC PEPTIDE: CPT

## 2021-10-31 PROCEDURE — 65660000000 HC RM CCU STEPDOWN

## 2021-10-31 PROCEDURE — 74011250636 HC RX REV CODE- 250/636: Performed by: HOSPITALIST

## 2021-10-31 RX ORDER — FUROSEMIDE 10 MG/ML
40 INJECTION INTRAMUSCULAR; INTRAVENOUS DAILY
Status: DISCONTINUED | OUTPATIENT
Start: 2021-11-01 | End: 2021-11-05 | Stop reason: HOSPADM

## 2021-10-31 RX ORDER — ACETAMINOPHEN 325 MG/1
650 TABLET ORAL
Status: DISCONTINUED | OUTPATIENT
Start: 2021-10-31 | End: 2021-11-05 | Stop reason: HOSPADM

## 2021-10-31 RX ORDER — POLYETHYLENE GLYCOL 3350 17 G/17G
17 POWDER, FOR SOLUTION ORAL DAILY PRN
Status: DISCONTINUED | OUTPATIENT
Start: 2021-10-31 | End: 2021-11-05 | Stop reason: HOSPADM

## 2021-10-31 RX ORDER — SODIUM CHLORIDE 0.9 % (FLUSH) 0.9 %
5-40 SYRINGE (ML) INJECTION AS NEEDED
Status: DISCONTINUED | OUTPATIENT
Start: 2021-10-31 | End: 2021-11-05 | Stop reason: HOSPADM

## 2021-10-31 RX ORDER — ONDANSETRON 8 MG/1
4 TABLET, ORALLY DISINTEGRATING ORAL
Status: DISCONTINUED | OUTPATIENT
Start: 2021-10-31 | End: 2021-11-05 | Stop reason: HOSPADM

## 2021-10-31 RX ORDER — ACETAMINOPHEN 650 MG/1
650 SUPPOSITORY RECTAL
Status: DISCONTINUED | OUTPATIENT
Start: 2021-10-31 | End: 2021-11-05 | Stop reason: HOSPADM

## 2021-10-31 RX ORDER — ONDANSETRON 2 MG/ML
4 INJECTION INTRAMUSCULAR; INTRAVENOUS
Status: DISCONTINUED | OUTPATIENT
Start: 2021-10-31 | End: 2021-11-05 | Stop reason: HOSPADM

## 2021-10-31 RX ORDER — MAGNESIUM SULFATE HEPTAHYDRATE 40 MG/ML
2 INJECTION, SOLUTION INTRAVENOUS AS NEEDED
Status: DISCONTINUED | OUTPATIENT
Start: 2021-10-31 | End: 2021-11-05 | Stop reason: HOSPADM

## 2021-10-31 RX ORDER — SODIUM CHLORIDE 0.9 % (FLUSH) 0.9 %
5-40 SYRINGE (ML) INJECTION EVERY 8 HOURS
Status: DISCONTINUED | OUTPATIENT
Start: 2021-10-31 | End: 2021-11-05 | Stop reason: HOSPADM

## 2021-10-31 RX ORDER — FUROSEMIDE 10 MG/ML
40 INJECTION INTRAMUSCULAR; INTRAVENOUS
Status: COMPLETED | OUTPATIENT
Start: 2021-10-31 | End: 2021-10-31

## 2021-10-31 RX ORDER — SAME BUTANEDISULFONATE/BETAINE 400-600 MG
500 POWDER IN PACKET (EA) ORAL 2 TIMES DAILY
Status: DISCONTINUED | OUTPATIENT
Start: 2021-10-31 | End: 2021-11-05 | Stop reason: HOSPADM

## 2021-10-31 RX ORDER — CLINDAMYCIN PHOSPHATE 600 MG/50ML
600 INJECTION INTRAVENOUS EVERY 8 HOURS
Status: DISCONTINUED | OUTPATIENT
Start: 2021-10-31 | End: 2021-11-01

## 2021-10-31 RX ADMIN — Medication 500 MG: at 18:47

## 2021-10-31 RX ADMIN — Medication 10 ML: at 22:05

## 2021-10-31 RX ADMIN — FUROSEMIDE 40 MG: 10 INJECTION, SOLUTION INTRAVENOUS at 11:18

## 2021-10-31 RX ADMIN — CLINDAMYCIN PHOSPHATE 600 MG: 600 INJECTION, SOLUTION INTRAVENOUS at 20:25

## 2021-10-31 NOTE — ED NOTES
TRANSFER - OUT REPORT:    Verbal report given to dilshad Weiss  being transferred to [facility]  for routine progression of care       Report consisted of patients Situation, Background, Assessment and   Recommendations(SBAR). Information from the following report(s) ED Summary was reviewed with the receiving nurse. Lines:   Peripheral IV 10/31/21 Right Antecubital (Active)        Opportunity for questions and clarification was provided.       Patient transported with:   O2 @ 5 liters  Tech

## 2021-10-31 NOTE — ED NOTES
This RN assisted pt to the bedside commode which involved standing and pivoting without walking any distance. Pt became hypoxic at 53% on RA. Pt became weak and severely short of breath and tachypneic. Pt placed on 4L NC O2 with improvement. MD notified.

## 2021-10-31 NOTE — DISCHARGE INSTRUCTIONS
Patient Education   Patient Education        Cellulitis: Care Instructions  Your Care Instructions     Cellulitis is a skin infection caused by bacteria, most often strep or staph. It often occurs after a break in the skin from a scrape, cut, bite, or puncture, or after a rash. Cellulitis may be treated without doing tests to find out what caused it. But your doctor may do tests, if needed, to look for a specific bacteria, like methicillin-resistant Staphylococcus aureus (MRSA). The doctor has checked you carefully, but problems can develop later. If you notice any problems or new symptoms, get medical treatment right away. Follow-up care is a key part of your treatment and safety. Be sure to make and go to all appointments, and call your doctor if you are having problems. It's also a good idea to know your test results and keep a list of the medicines you take. How can you care for yourself at home? · Take your antibiotics as directed. Do not stop taking them just because you feel better. You need to take the full course of antibiotics. · Prop up the infected area on pillows to reduce pain and swelling. Try to keep the area above the level of your heart as often as you can. · If your doctor told you how to care for your wound, follow your doctor's instructions. If you did not get instructions, follow this general advice:  ? Wash the wound with clean water 2 times a day. Don't use hydrogen peroxide or alcohol, which can slow healing. ? You may cover the wound with a thin layer of petroleum jelly, such as Vaseline, and a nonstick bandage. ? Apply more petroleum jelly and replace the bandage as needed. · Be safe with medicines. Take pain medicines exactly as directed. ? If the doctor gave you a prescription medicine for pain, take it as prescribed. ? If you are not taking a prescription pain medicine, ask your doctor if you can take an over-the-counter medicine.   To prevent cellulitis in the future  · Try to prevent cuts, scrapes, or other injuries to your skin. Cellulitis most often occurs where there is a break in the skin. · If you get a scrape, cut, mild burn, or bite, wash the wound with clean water as soon as you can to help avoid infection. Don't use hydrogen peroxide or alcohol, which can slow healing. · If you have swelling in your legs (edema), support stockings and good skin care may help prevent leg sores and cellulitis. · Take care of your feet, especially if you have diabetes or other conditions that increase the risk of infection. Wear shoes and socks. Do not go barefoot. If you have athlete's foot or other skin problems on your feet, talk to your doctor about how to treat them. When should you call for help? Call your doctor now or seek immediate medical care if:    · You have signs that your infection is getting worse, such as:  ? Increased pain, swelling, warmth, or redness. ? Red streaks leading from the area. ? Pus draining from the area. ? A fever.     · You get a rash. Watch closely for changes in your health, and be sure to contact your doctor if:    · You do not get better as expected. Where can you learn more? Go to http://www.gray.com/  Enter X309 in the search box to learn more about \"Cellulitis: Care Instructions. \"  Current as of: March 3, 2021               Content Version: 13.0  © 0976-2155 [employer]. Care instructions adapted under license by [employer] (which disclaims liability or warranty for this information). If you have questions about a medical condition or this instruction, always ask your healthcare professional. [place] [address] any warranty or liability for your use of this information. Heart Rhythm Problems in Heart Failure: Care Instructions  Your Care Instructions     A heart rhythm problem, or arrhythmia, is a change in the normal rhythm of your heart.  Your heart may beat too fast or too slow or beat with an irregular or skipping rhythm. A change in the heart's rhythm may feel like a really strong heartbeat or a fluttering in your chest. A severe heart rhythm problem can keep the body from getting the blood it needs. This can cause shortness of breath, lightheadedness, and fainting. A heart rhythm problem can make your heart failure worse and increase your chance of dying suddenly. You may take medicine to treat your condition. Your doctor may recommend a pacemaker, an implantable cardioverter-defibrillator (ICD), or a procedure called catheter ablation to destroy small parts of the heart that are causing a rhythm problem. Follow-up care is a key part of your treatment and safety. Be sure to make and go to all appointments, and call your doctor if you are having problems. It's also a good idea to know your test results and keep a list of the medicines you take. How can you care for yourself at home? · Take your medicines exactly as prescribed. Talk to your doctor if you have any problems with your medicines. · If you received a pacemaker or an implantable cardioverter-defibrillator (ICD), you will get a fact sheet about it. · Wear a medical alert ID bracelet. You can buy one at most drugstores or order it online. · Make sure you go to your follow-up appointments. To change your lifestyle  · Do not smoke. · Eat a heart-healthy diet. · Limit or avoid alcohol. · Stay at a healthy weight. Lose weight if you need to. · Ask your doctor whether you can take over-the-counter medicines (such as decongestants). These can make your heart beat fast.  Be active   · Start light exercise if your doctor says you can. Even a small amount will help you get stronger, have more energy, and manage your stress. · Walk to get exercise easily. Start by walking a little more than you did the day before. Bit by bit, increase the amount you walk.   · When you exercise, watch for signs that your heart is working too hard. You are pushing too hard if you cannot talk while you exercise. If you become short of breath or dizzy or have chest pain, sit down and rest.  · If your doctor has not set you up with a cardiac rehabilitation (rehab) program, talk to him or her about whether that is right for you. Cardiac rehab includes exercise, help with diet and lifestyle changes, and emotional support. It may reduce your risk of future heart problems. · Check your pulse daily. Place two fingers on the artery at the palm side of your wrist, in line with your thumb. If your heartbeat seems uneven, talk to your doctor. When should you call for help? Call 911  anytime you think you may need emergency care. For example, call if:    · You have symptoms of sudden heart failure, such as:  ? Severe trouble breathing. ? Coughing up pink, foamy mucus. ? A new irregular or rapid heartbeat.     · You have symptoms of a heart attack. These may include:  ? Chest pain or pressure, or a strange feeling in the chest.  ? Sweating. ? Shortness of breath. ? Nausea or vomiting. ? Pain, pressure, or a strange feeling in the back, neck, jaw, or upper belly or in one or both shoulders or arms. ? Lightheadedness or sudden weakness. ? A fast or irregular heartbeat. After you call 911, the  may tell you to chew 1 adult-strength or 2 to 4 low-dose aspirin. Wait for an ambulance. Do not try to drive yourself. Call your doctor now or seek immediate medical care if:    · You have new or increased shortness of breath.     · You are dizzy or lightheaded, or you feel like you may faint.     · You have sudden weight gain, such as more than 2 to 3 pounds in a day or 5 pounds in a week. (Your doctor may suggest a different range of weight gain.)     · You have increased swelling in your legs, ankles, or feet.     · You are suddenly so tired or weak that you cannot do your usual activities.    Watch closely for changes in your health, and be sure to contact your doctor if you develop new symptoms. Where can you learn more? Go to http://www.gray.com/  Enter A085 in the search box to learn more about \"Heart Rhythm Problems in Heart Failure: Care Instructions. \"  Current as of: April 29, 2021               Content Version: 13.0  © 4164-0675 [employer]. Care instructions adapted under license by [employer] (which disclaims liability or warranty for this information). If you have questions about a medical condition or this instruction, always ask your healthcare professional. [address] any warranty or liability for your use of this information. Continue taking home medications. Schedule close follow-up with Primary Care physician, [facility]. Return to ED if symptoms worsen or progress in any way.

## 2021-10-31 NOTE — PROGRESS NOTES
Patient arrived in stable condition from the ED with daughter at the bedside. Patient is AOx3 and stable on 4L oxygen via nc. RLE clau and warm. No edema noted. Patient oriented to the room with bed low and call light within reach.

## 2021-10-31 NOTE — PROGRESS NOTES
INITIAL SPIRITUAL ASSESSMENT     NOTED:      PATIENT IS BEING ADMITTED TO FLOOR FROM ER    PREFERRED NAME:  Gricelda Small    Mandaen        DAUGHTER - NATHANIEL    LIVES LOCALLY    DNR    HAS DIRECTIVES    MULTIPLE HEALTH CONCERNS    WEARS GLASSES            WILL ASSESS HOW WE CAN BEST SERVE THIS FAMILY

## 2021-10-31 NOTE — ED PROVIDER NOTES
63-year-old female with history of CVA, CHF, hypertension, CAD, atrial fibrillation presents via EMS from [facility] with complaint of chest heaviness this morning that started after she ate breakfast.  Patient reportedly was given 2 nitro by staff and stated that time that it relieved the pain. Patient poor historian unable to give vital historical information. Patient was given aspirin in route. Denies shortness of breath, nausea, vomiting, diaphoresis, abdominal pain, cough, hemoptysis. Patient with no other symptoms at this time. Patient with residual deficits following previous CVA. Patient reportedly received both of her COVID-19 vaccines as well as booster. The history is provided by the patient. No  was used. Chest Pain (Angina)   This is a new problem. The current episode started 1 to 2 hours ago. The problem has been resolved. Pertinent negatives include no abdominal pain, no back pain, no cough, no diaphoresis, no dizziness, no fever, no nausea, no numbness, no palpitations, no shortness of breath, no vomiting and no weakness. Risk factors include hypertension and cardiac disease.         Past Medical History:   Diagnosis Date    Anemia associated with acute blood loss 7/24/2011    Atrial fibrillation ([address] 7/13/2011    CAD (coronary artery disease) 1997    MI, PCI , CABG 7/13/2011    Chronic diastolic heart failure ([address] 10/29/2015    Chronic pain     Constipation- no BM since 7/30 8/5/2011    Coronary atherosclerosis of native coronary artery--PTCA 1997 LAD/diag 7/1/2011    CVA (cerebrovascular accident) ([address] 3/29/2009    mild memory issues, rls, afib    Diastolic CHF, acute on chronic ([address] 12/25/2017    GI bleed 1/11/2015    HEMOTHORAX ON LEFT  7/24/2011    Hyperlipemia 3/29/2009    Hypertension 3/29/2009    Hypertension, benign 10/29/2015    Hyponatremia 7/24/2011    Hypothyroidism 7/7/2011    Leucocytosis 8/3/2011    Leukocytosis 2015    Irene-Starr tear 2015    Mitral valve insufficiency 2009    Paroxysmal atrial fibrillation (HCC) 10/29/2015    Pleural effusion, bilateral 2011    Right thorocetesis 1200ml removed 11 and 1000ml removed 11 Left thorocentesis 1100ml removed 11     Pneumonia due to methicillin resistant Staphylococcus aureus ([facility] 2019    S/p thoracotomy- evauation of hemothorax 2011    Unspecified hypothyroidism 3/30/2009       Past Surgical History:   Procedure Laterality Date    HX BACK SURGERY      HX ORTHOPAEDIC  4 years ago    neck    HX ORTHOPAEDIC  10 years    back    HX OTHER SURGICAL      steroid injection neck, 2 weeks ago    [place] CABG, ARTERY-VEIN, THREE  2011         Family History:   Problem Relation Age of Onset    Heart Attack Father 36       Social History     Socioeconomic History    Marital status:      Spouse name: Not on file    Number of children: Not on file    Years of education: Not on file    Highest education level: Not on file   Occupational History    Not on file   Tobacco Use    Smoking status: Former Smoker     Quit date:      Years since quittin.8    Smokeless tobacco: Never Used   Substance and Sexual Activity    Alcohol use: No    Drug use: No    Sexual activity: Never   Other Topics Concern    Not on file   Social History Narrative    Not on file     Social Determinants of Health     Financial Resource Strain:     Difficulty of Paying Living Expenses:    Food Insecurity:     Worried About Running Out of Food in the Last Year:     [address] in the Last Year:    Transportation Needs:     Lack of Transportation (Medical):      Lack of Transportation (Non-Medical):    Physical Activity:     Days of Exercise per Week:     Minutes of Exercise per Session:    Stress:     Feeling of Stress :    Social Connections:     Frequency of Communication with Friends and Family:     Frequency of Social Gatherings with Friends and Family:     Attends [employer]ae[employer] Services:     Active Member of Clubs or Organizations:     Attends Club or Organization Meetings:     Marital Status:    Intimate Partner Violence:     Fear of Current or Ex-Partner:     Emotionally Abused:     Physically Abused:     Sexually Abused: ALLERGIES: Versed [midazolam]    Review of Systems   Constitutional: Negative for chills, diaphoresis, fatigue and fever. HENT: Negative for congestion and rhinorrhea. Respiratory: Negative for cough and shortness of breath. Cardiovascular: Positive for chest pain. Negative for palpitations. Gastrointestinal: Negative for abdominal pain, diarrhea, nausea and vomiting. Genitourinary: Negative for dysuria and flank pain. Musculoskeletal: Negative for back pain and myalgias. Skin: Negative for rash. Neurological: Negative for dizziness, seizures, weakness and numbness. Hematological: Does not bruise/bleed easily. Psychiatric/Behavioral: Negative for confusion. Vitals:    10/31/21 0935   BP: 128/67   Pulse: 63   Resp: 18   Temp: 98.2 °F (36.8 °C)   SpO2: 98%   Weight: 56.2 kg (124 lb)   Height: 5' (1.524 m)            Physical Exam  Vitals and nursing note reviewed. Constitutional:       Appearance: Normal appearance. HENT:      Head: Normocephalic. Nose: Nose normal.      Mouth/Throat:      Mouth: Mucous membranes are moist.   Eyes:      Extraocular Movements: Extraocular movements intact. Conjunctiva/sclera: Conjunctivae normal.      Pupils: Pupils are equal, round, and reactive to light. Cardiovascular:      Rate and Rhythm: Normal rate. Rhythm irregular. Pulses: Normal pulses. Comments: Pulses 2+ throughout. Pulmonary:      Effort: Pulmonary effort is normal.      Comments: Coarse breath sounds noted bilaterally  Abdominal:      General: Bowel sounds are normal.      Palpations: Abdomen is soft. Tenderness:  There is no abdominal tenderness. There is no guarding or rebound. Musculoskeletal:         General: No tenderness. Normal range of motion. Cervical back: Normal range of motion. Right lower leg: Edema present. Left lower leg: Edema present. Comments: No calf tenderness. Skin:     General: Skin is warm. Coloration: Skin is not jaundiced. Neurological:      Mental Status: She is alert and oriented to person, place, and time. Mental status is at baseline. Sensory: No sensory deficit. Motor: No weakness. Comments: No new focal deficits. MDM  Number of Diagnoses or Management Options  Acute on chronic congestive heart failure, unspecified heart failure type [facility]): new and requires workup  Acute pulmonary edema (HCC)  Chest pain, unspecified type: new and requires workup  Pleural effusion on right: new and requires workup  Diagnosis management comments: Vital signs stable. Patient well-appearing. Sats remained stable throughout ED stay but was on supplemental O2 \"for comfort\" for unknown reasons. Was informed that supplemental O2 was later removed by RN and that her sats remained 100%. Chest x-ray with pulmonary edema with right pleural effusion. Initial repeat troponin unremarkable. EKG with atrial fibrillation. Rapid Covid negative. Patient at baseline with no new focal deficits. Patient sats have remained stable and is no acute distress. Daughter in agreement with discharge back to facility and will return if symptoms worsen or progress in any way. Patient denies any chest pain this time. Patient given Lasix 40 mg IV. Will contact [facility] cardiology referral line to ensure that patient has close outpatient follow-up with Cards. At time of reassessment patient's O2 sats 86% on room air. Patient moving and uncertain if this is due to her nail polish over the that she has tremor. Attempted additional pulse oximeter that was also noted to be hypoxic.   Patient with episodes of hypoxia and normal oxygen saturations. Will obtain ABG. ABG with PO2 of 52. Will place on 2 L O2 via nasal cannula consult hospitalist.       Amount and/or Complexity of Data Reviewed  Clinical lab tests: ordered and reviewed  Tests in the radiology section of CPT®: ordered and reviewed  Tests in the medicine section of CPT®: ordered and reviewed  Review and summarize past medical records: yes  Discuss the patient with other providers: yes  Independent visualization of images, tracings, or specimens: yes    Risk of Complications, Morbidity, and/or Mortality  Presenting problems: high  Diagnostic procedures: moderate  Management options: moderate    Patient Progress  Patient progress: stable    ED Course as of Oct 31 1330   Sun Oct 31, 2021   1028 CXR FINDINGS: A portable AP radiograph of the chest was obtained at 0945 hours. The  patient is on a cardiac monitor. Diffuse increased interstitial markings. Right  pleural effusion. The cardiac and mediastinal contours and pulmonary vascularity  are normal.  Median sternotomy.      IMPRESSION  Pulmonary edema with a right pleural effusion. [DF]   1310 Troponin-High Sensitivity: 12.4 [DF]      ED Course User Index  [DF] Isaiah Rodriguez MD       EKG    Date/Time: 10/31/2021 9:49 AM  Performed by: Isaiah Rodriguez MD  Authorized by:  Isaiah Rodriguez MD     ECG reviewed by ED Physician in the absence of a cardiologist: yes    Rate:     ECG rate:  63    ECG rate assessment: normal    Rhythm:     Rhythm: atrial fibrillation    Ectopy:     Ectopy: none    QRS:     QRS axis:  Normal    QRS intervals:  Normal  Conduction:     Conduction: normal    ST segments:     ST segments:  Normal  T waves:     T waves: normal          Results Include:    Recent Results (from the past 24 hour(s))   EKG, 12 LEAD, INITIAL    Collection Time: 10/31/21  9:40 AM   Result Value Ref Range    Ventricular Rate 63 BPM    Atrial Rate 122 BPM    QRS Duration 112 ms    Q-T Interval 402 ms    QTC Calculation (Bezet) 411 ms    Calculated R Axis 59 degrees    Calculated T Axis 84 degrees    Diagnosis       !! AGE AND GENDER SPECIFIC ECG ANALYSIS !! Atrial fibrillation  Low voltage QRS  Cannot rule out Anteroseptal infarct , age undetermined  Abnormal ECG  When compared with ECG of 06-FEB-2018 13:07,  Atrial fibrillation has replaced Sinus rhythm    Confirmed by ST GUILLERMO READ MD (), DANIEL LARIOS ([id number]) on 10/31/2021 12:44:24 PM     CBC WITH AUTOMATED DIFF    Collection Time: 10/31/21  9:58 AM   Result Value Ref Range    WBC 6.9 4.3 - 11.1 K/uL    RBC 3.16 (L) 4.05 - 5.2 M/uL    HGB 10.4 (L) 11.7 - 15.4 g/dL    HCT 32.9 (L) 35.8 - 46.3 %    .1 (H) 79.6 - 97.8 FL    MCH 32.9 26.1 - 32.9 PG    MCHC 31.6 31.4 - 35.0 g/dL    RDW 14.7 (H) 11.9 - 14.6 %    PLATELET 018 904 - 303 K/uL    MPV 10.2 9.4 - 12.3 FL    ABSOLUTE NRBC 0.00 0.0 - 0.2 K/uL    DF AUTOMATED      NEUTROPHILS 64 43 - 78 %    LYMPHOCYTES 18 13 - 44 %    MONOCYTES 14 (H) 4.0 - 12.0 %    EOSINOPHILS 3 0.5 - 7.8 %    BASOPHILS 1 0.0 - 2.0 %    IMMATURE GRANULOCYTES 1 0.0 - 5.0 %    ABS. NEUTROPHILS 4.4 1.7 - 8.2 K/UL    ABS. LYMPHOCYTES 1.3 0.5 - 4.6 K/UL    ABS. MONOCYTES 1.0 0.1 - 1.3 K/UL    ABS. EOSINOPHILS 0.2 0.0 - 0.8 K/UL    ABS. BASOPHILS 0.0 0.0 - 0.2 K/UL    ABS. IMM. GRANS. 0.1 0.0 - 0.5 K/UL   METABOLIC PANEL, COMPREHENSIVE    Collection Time: 10/31/21  9:58 AM   Result Value Ref Range    Sodium 139 136 - 145 mmol/L    Potassium 4.2 3.5 - 5.1 mmol/L    Chloride 109 (H) 98 - 107 mmol/L    CO2 27 21 - 32 mmol/L    Anion gap 3 (L) 7 - 16 mmol/L    Glucose 163 (H) 65 - 100 mg/dL    BUN 28 (H) 8 - 23 MG/DL    Creatinine 1.16 (H) 0.6 - 1.0 MG/DL    GFR est AA 56 (L) >60 ml/min/1.73m2    GFR est non-AA 47 (L) >60 ml/min/1.73m2    Calcium 10.0 8.3 - 10.4 MG/DL    Bilirubin, total 0.4 0.2 - 1.1 MG/DL    ALT (SGPT) 14 12 - 65 U/L    AST (SGOT) 17 15 - 37 U/L    Alk.  phosphatase 150 (H) 50 - 136 U/L Protein, total 7.2 6.3 - 8.2 g/dL    Albumin 3.1 (L) 3.2 - 4.6 g/dL    Globulin 4.1 (H) 2.3 - 3.5 g/dL    A-G Ratio 0.8 (L) 1.2 - 3.5     LIPASE    Collection Time: 10/31/21  9:58 AM   Result Value Ref Range    Lipase 109 73 - 393 U/L   TROPONIN-HIGH SENSITIVITY    Collection Time: 10/31/21  9:58 AM   Result Value Ref Range    Troponin-High Sensitivity 12.1 0 - 14 pg/mL   COVID-19 RAPID TEST    Collection Time: 10/31/21 11:03 AM   Result Value Ref Range    Specimen source NASAL      COVID-19 rapid test Not detected NOTD     TROPONIN-HIGH SENSITIVITY    Collection Time: 10/31/21 11:42 AM   Result Value Ref Range    Troponin-High Sensitivity 12.4 0 - 14 pg/mL           Geovanni Fraser MD; 10/31/2021 @9:49 AM Voice dictation software was used during the making of this note. This software is not perfect and grammatical and other typographical errors may be present.   This note has not been proofread for errors.  ===================================================================

## 2021-10-31 NOTE — ED TRIAGE NOTES
Patient arrived from [facility] via [facility] ems. Patient complained of chest heaviness this morning and was given 2 nitro by staff and patient stated that it relieved the pain. Ems gave 324 asprin en route.  Patient had a stroke a couple of years ago and has known deficits

## 2021-11-01 ENCOUNTER — APPOINTMENT (OUTPATIENT)
Dept: NON INVASIVE DIAGNOSTICS | Age: 86
DRG: 291 | End: 2021-11-01
Attending: NURSE PRACTITIONER
Payer: MEDICARE

## 2021-11-01 PROBLEM — R06.02 SOB (SHORTNESS OF BREATH): Status: ACTIVE | Noted: 2021-11-01

## 2021-11-01 PROBLEM — R07.9 CHEST PAIN: Status: ACTIVE | Noted: 2021-11-01

## 2021-11-01 LAB
ALBUMIN SERPL-MCNC: 3.2 G/DL (ref 3.2–4.6)
ALBUMIN/GLOB SERPL: 0.7 {RATIO} (ref 1.2–3.5)
ALP SERPL-CCNC: 151 U/L (ref 50–136)
ALT SERPL-CCNC: 14 U/L (ref 12–65)
ANION GAP SERPL CALC-SCNC: 7 MMOL/L (ref 7–16)
AST SERPL-CCNC: 17 U/L (ref 15–37)
BASOPHILS # BLD: 0.1 K/UL (ref 0–0.2)
BASOPHILS NFR BLD: 1 % (ref 0–2)
BILIRUB SERPL-MCNC: 0.4 MG/DL (ref 0.2–1.1)
BUN SERPL-MCNC: 32 MG/DL (ref 8–23)
CALCIUM SERPL-MCNC: 10.2 MG/DL (ref 8.3–10.4)
CHLORIDE SERPL-SCNC: 105 MMOL/L (ref 98–107)
CO2 SERPL-SCNC: 28 MMOL/L (ref 21–32)
CREAT SERPL-MCNC: 1.25 MG/DL (ref 0.6–1)
DIFFERENTIAL METHOD BLD: ABNORMAL
ECHO AO ASC DIAM: 3.1 CM
ECHO AV AREA PEAK VELOCITY: 1.32 CM2
ECHO AV AREA VTI: 1.4 CM2
ECHO AV AREA/BSA PEAK VELOCITY: 0.9 CM2/M2
ECHO AV AREA/BSA VTI: 0.9 CM2/M2
ECHO AV CUSP MM: 1.7 CM
ECHO AV MEAN GRADIENT: 5 MMHG
ECHO AV PEAK GRADIENT: 13 MMHG
ECHO AV PEAK VELOCITY: 179 CM/S
ECHO AV VTI: 32.6 CM
ECHO EST RA PRESSURE: 8 MMHG
ECHO LA AREA 2C: 22.6 CM2
ECHO LA AREA 4C: 24.4 CM2
ECHO LA MAJOR AXIS: 6.5 CM
ECHO LA MINOR AXIS: 4.19 CM
ECHO LV E' LATERAL VELOCITY: 12.2 CM/S
ECHO LV E' SEPTAL VELOCITY: 8.7 CM/S
ECHO LV EDV A4C: 28.4 CM3
ECHO LV ESV A4C: 8.35 CM3
ECHO LV INTERNAL DIMENSION DIASTOLIC: 3.52 CM (ref 3.9–5.3)
ECHO LV INTERNAL DIMENSION SYSTOLIC: 2.25 CM
ECHO LV IVSD: 0.99 CM (ref 0.6–0.9)
ECHO LV MASS 2D: 96.1 G (ref 67–162)
ECHO LV MASS INDEX 2D: 62 G/M2 (ref 43–95)
ECHO LV POSTERIOR WALL DIASTOLIC: 0.9 CM (ref 0.6–0.9)
ECHO LVOT DIAM: 1.7 CM
ECHO LVOT PEAK GRADIENT: 4 MMHG
ECHO LVOT VTI: 20.1 CM
ECHO MV A VELOCITY: 30 CM/S
ECHO MV E DECELERATION TIME (DT): 165 MS
ECHO MV E VELOCITY: 129 CM/S
ECHO MV E/A RATIO: 4.3
ECHO MV E/E' LATERAL: 10.57
ECHO MV E/E' RATIO (AVERAGED): 12.7
ECHO MV E/E' SEPTAL: 14.83
ECHO MV MAX VELOCITY: 142 CM/S
ECHO MV MEAN GRADIENT: 2 MMHG
ECHO MV PEAK GRADIENT: 8 MMHG
ECHO MV VTI: 31 CM
ECHO PV REGURGITANT MAX VELOCITY: 115 CM/S
ECHO RA AREA 4C: 22.4 CM2
ECHO RIGHT VENTRICULAR SYSTOLIC PRESSURE (RVSP): 58 MMHG
ECHO RV INTERNAL DIMENSION: 1.91 CM
ECHO RV TAPSE: 1.87 CM (ref 1.5–2)
ECHO TV REGURGITANT MAX VELOCITY: 354 CM/S
ECHO TV REGURGITANT PEAK GRADIENT: 50 MMHG
EOSINOPHIL # BLD: 0.2 K/UL (ref 0–0.8)
EOSINOPHIL NFR BLD: 3 % (ref 0.5–7.8)
ERYTHROCYTE [DISTWIDTH] IN BLOOD BY AUTOMATED COUNT: 14.6 % (ref 11.9–14.6)
GLOBULIN SER CALC-MCNC: 4.3 G/DL (ref 2.3–3.5)
GLUCOSE SERPL-MCNC: 94 MG/DL (ref 65–100)
HCT VFR BLD AUTO: 35.3 % (ref 35.8–46.3)
HGB BLD-MCNC: 11.2 G/DL (ref 11.7–15.4)
IMM GRANULOCYTES # BLD AUTO: 0.1 K/UL (ref 0–0.5)
IMM GRANULOCYTES NFR BLD AUTO: 1 % (ref 0–5)
LYMPHOCYTES # BLD: 1.8 K/UL (ref 0.5–4.6)
LYMPHOCYTES NFR BLD: 22 % (ref 13–44)
MCH RBC QN AUTO: 32.9 PG (ref 26.1–32.9)
MCHC RBC AUTO-ENTMCNC: 31.7 G/DL (ref 31.4–35)
MCV RBC AUTO: 103.8 FL (ref 79.6–97.8)
MONOCYTES # BLD: 1.1 K/UL (ref 0.1–1.3)
MONOCYTES NFR BLD: 14 % (ref 4–12)
NEUTS SEG # BLD: 4.6 K/UL (ref 1.7–8.2)
NEUTS SEG NFR BLD: 59 % (ref 43–78)
NRBC # BLD: 0 K/UL (ref 0–0.2)
PLATELET # BLD AUTO: 271 K/UL (ref 150–450)
PMV BLD AUTO: 10.2 FL (ref 9.4–12.3)
POTASSIUM SERPL-SCNC: 4.2 MMOL/L (ref 3.5–5.1)
PROT SERPL-MCNC: 7.5 G/DL (ref 6.3–8.2)
RBC # BLD AUTO: 3.4 M/UL (ref 4.05–5.2)
SODIUM SERPL-SCNC: 140 MMOL/L (ref 136–145)
WBC # BLD AUTO: 7.8 K/UL (ref 4.3–11.1)

## 2021-11-01 PROCEDURE — 93306 TTE W/DOPPLER COMPLETE: CPT

## 2021-11-01 PROCEDURE — 97110 THERAPEUTIC EXERCISES: CPT

## 2021-11-01 PROCEDURE — 36415 COLL VENOUS BLD VENIPUNCTURE: CPT

## 2021-11-01 PROCEDURE — 74011250637 HC RX REV CODE- 250/637: Performed by: STUDENT IN AN ORGANIZED HEALTH CARE EDUCATION/TRAINING PROGRAM

## 2021-11-01 PROCEDURE — 74011250637 HC RX REV CODE- 250/637: Performed by: HOSPITALIST

## 2021-11-01 PROCEDURE — 80053 COMPREHEN METABOLIC PANEL: CPT

## 2021-11-01 PROCEDURE — 97166 OT EVAL MOD COMPLEX 45 MIN: CPT

## 2021-11-01 PROCEDURE — 74011000250 HC RX REV CODE- 250: Performed by: HOSPITALIST

## 2021-11-01 PROCEDURE — 74011250636 HC RX REV CODE- 250/636: Performed by: HOSPITALIST

## 2021-11-01 PROCEDURE — 97535 SELF CARE MNGMENT TRAINING: CPT

## 2021-11-01 PROCEDURE — 99222 1ST HOSP IP/OBS MODERATE 55: CPT | Performed by: INTERNAL MEDICINE

## 2021-11-01 PROCEDURE — 65660000000 HC RM CCU STEPDOWN

## 2021-11-01 PROCEDURE — 85025 COMPLETE CBC W/AUTO DIFF WBC: CPT

## 2021-11-01 PROCEDURE — 97161 PT EVAL LOW COMPLEX 20 MIN: CPT

## 2021-11-01 RX ORDER — NITROGLYCERIN 0.4 MG/1
0.4 TABLET SUBLINGUAL
Status: DISCONTINUED | OUTPATIENT
Start: 2021-11-01 | End: 2021-11-05 | Stop reason: HOSPADM

## 2021-11-01 RX ORDER — CLINDAMYCIN HYDROCHLORIDE 150 MG/1
300 CAPSULE ORAL EVERY 8 HOURS
Status: DISCONTINUED | OUTPATIENT
Start: 2021-11-01 | End: 2021-11-05 | Stop reason: HOSPADM

## 2021-11-01 RX ORDER — ROSUVASTATIN CALCIUM 20 MG/1
40 TABLET, COATED ORAL
Status: DISCONTINUED | OUTPATIENT
Start: 2021-11-01 | End: 2021-11-05 | Stop reason: HOSPADM

## 2021-11-01 RX ORDER — BUPRENORPHINE 10 UG/H
1 PATCH TRANSDERMAL
Status: DISCONTINUED | OUTPATIENT
Start: 2021-11-01 | End: 2021-11-05 | Stop reason: HOSPADM

## 2021-11-01 RX ORDER — GABAPENTIN 300 MG/1
300 CAPSULE ORAL 2 TIMES DAILY
Status: DISCONTINUED | OUTPATIENT
Start: 2021-11-01 | End: 2021-11-05 | Stop reason: HOSPADM

## 2021-11-01 RX ORDER — TIMOLOL MALEATE 5 MG/ML
1 SOLUTION/ DROPS OPHTHALMIC DAILY
Status: DISCONTINUED | OUTPATIENT
Start: 2021-11-01 | End: 2021-11-05 | Stop reason: HOSPADM

## 2021-11-01 RX ORDER — METOPROLOL SUCCINATE 25 MG/1
25 TABLET, EXTENDED RELEASE ORAL DAILY
Status: DISCONTINUED | OUTPATIENT
Start: 2021-11-01 | End: 2021-11-05 | Stop reason: HOSPADM

## 2021-11-01 RX ORDER — GEMFIBROZIL 600 MG/1
600 TABLET, FILM COATED ORAL 2 TIMES DAILY
Status: DISCONTINUED | OUTPATIENT
Start: 2021-11-01 | End: 2021-11-05

## 2021-11-01 RX ORDER — POTASSIUM CHLORIDE 20 MEQ/1
20 TABLET, EXTENDED RELEASE ORAL 2 TIMES DAILY
Status: DISCONTINUED | OUTPATIENT
Start: 2021-11-01 | End: 2021-11-05 | Stop reason: HOSPADM

## 2021-11-01 RX ORDER — LATANOPROST 50 UG/ML
1 SOLUTION/ DROPS OPHTHALMIC
Status: DISCONTINUED | OUTPATIENT
Start: 2021-11-01 | End: 2021-11-05 | Stop reason: HOSPADM

## 2021-11-01 RX ORDER — DULOXETIN HYDROCHLORIDE 60 MG/1
60 CAPSULE, DELAYED RELEASE ORAL DAILY
Status: DISCONTINUED | OUTPATIENT
Start: 2021-11-01 | End: 2021-11-05 | Stop reason: HOSPADM

## 2021-11-01 RX ORDER — ASCORBIC ACID 500 MG
500 TABLET ORAL DAILY
Status: DISCONTINUED | OUTPATIENT
Start: 2021-11-01 | End: 2021-11-05 | Stop reason: HOSPADM

## 2021-11-01 RX ORDER — ROPINIROLE 1 MG/1
1 TABLET, FILM COATED ORAL 3 TIMES DAILY
Status: DISCONTINUED | OUTPATIENT
Start: 2021-11-01 | End: 2021-11-04

## 2021-11-01 RX ORDER — LEVOTHYROXINE SODIUM 112 UG/1
112 TABLET ORAL
Status: DISCONTINUED | OUTPATIENT
Start: 2021-11-01 | End: 2021-11-05 | Stop reason: HOSPADM

## 2021-11-01 RX ADMIN — POTASSIUM CHLORIDE 20 MEQ: 20 TABLET, EXTENDED RELEASE ORAL at 17:19

## 2021-11-01 RX ADMIN — GABAPENTIN 300 MG: 300 CAPSULE ORAL at 22:12

## 2021-11-01 RX ADMIN — METOPROLOL SUCCINATE 25 MG: 25 TABLET, EXTENDED RELEASE ORAL at 10:03

## 2021-11-01 RX ADMIN — DULOXETINE HYDROCHLORIDE 60 MG: 60 CAPSULE, DELAYED RELEASE ORAL at 10:04

## 2021-11-01 RX ADMIN — POTASSIUM CHLORIDE 20 MEQ: 20 TABLET, EXTENDED RELEASE ORAL at 10:03

## 2021-11-01 RX ADMIN — ROPINIROLE HYDROCHLORIDE 1 MG: 1 TABLET, FILM COATED ORAL at 22:12

## 2021-11-01 RX ADMIN — LEVOTHYROXINE SODIUM 112 MCG: 0.11 TABLET ORAL at 10:03

## 2021-11-01 RX ADMIN — APIXABAN 2.5 MG: 2.5 TABLET, FILM COATED ORAL at 10:04

## 2021-11-01 RX ADMIN — Medication 500 MG: at 17:19

## 2021-11-01 RX ADMIN — ROPINIROLE HYDROCHLORIDE 1 MG: 1 TABLET, FILM COATED ORAL at 15:34

## 2021-11-01 RX ADMIN — TIMOLOL MALEATE 1 DROP: 5 SOLUTION/ DROPS OPHTHALMIC at 10:04

## 2021-11-01 RX ADMIN — OXYCODONE HYDROCHLORIDE AND ACETAMINOPHEN 500 MG: 500 TABLET ORAL at 10:03

## 2021-11-01 RX ADMIN — Medication 10 ML: at 06:00

## 2021-11-01 RX ADMIN — FUROSEMIDE 40 MG: 10 INJECTION, SOLUTION INTRAMUSCULAR; INTRAVENOUS at 10:03

## 2021-11-01 RX ADMIN — Medication 10 ML: at 14:36

## 2021-11-01 RX ADMIN — CLINDAMYCIN HYDROCHLORIDE 300 MG: 150 CAPSULE ORAL at 14:36

## 2021-11-01 RX ADMIN — CLINDAMYCIN HYDROCHLORIDE 300 MG: 150 CAPSULE ORAL at 22:12

## 2021-11-01 RX ADMIN — CLINDAMYCIN PHOSPHATE 600 MG: 600 INJECTION, SOLUTION INTRAVENOUS at 06:00

## 2021-11-01 RX ADMIN — ROSUVASTATIN CALCIUM 40 MG: 20 TABLET, COATED ORAL at 22:12

## 2021-11-01 RX ADMIN — APIXABAN 2.5 MG: 2.5 TABLET, FILM COATED ORAL at 17:19

## 2021-11-01 RX ADMIN — GABAPENTIN 300 MG: 300 CAPSULE ORAL at 10:03

## 2021-11-01 RX ADMIN — Medication 10 ML: at 22:12

## 2021-11-01 RX ADMIN — GEMFIBROZIL 600 MG: 600 TABLET ORAL at 17:19

## 2021-11-01 RX ADMIN — Medication 500 MG: at 10:03

## 2021-11-01 NOTE — PROGRESS NOTES
Hospitalist Progress Note   Admit Date:  10/31/2021  9:27 AM   Name:  Tori Batista   Age:  80 y.o. Sex:  female  :  1931   MRN:  [id number]   Room:  [facility]/    Presenting Complaint: Chest Pain (Angina)    Reason(s) for Admission: Acute hypoxemic respiratory failure ([facility] [address] [J96.01]  Acute exacerbation of CHF (congestive heart failure) ([facility] [address] [I50.9]  Cellulitis of right leg [L03.115]     Hospital Course & Interval History:   Ms. Lara Bloch is a 80 y.o. F with a PMH of hypertension, coronary artery disease, atrial fibrillation, diastolic CHF who was brought to the ER by EMS from [facility] because of chest heaviness. She was given sublingual nitro x2 and ASA and her chest pain improved. She had substernal chest pain with no radiation prior to presenting. She became hypoxic in the ER w/ SpO2 80-85%; pO2 was 52. CXR showed pulmonary edema. She was admitted to the Hospitalist service with acute hypoxemic respiratory failure. Subjective (21):  No AEO. Ms. Lara Bloch denies CP during morning exam.  She said she does not wear O2 at the [facility]. Will monitor Cardiology recs. Assessment & Plan:     Principal Problem:  # Acute hypoxemic respiratory failure ([facility] [address] (10/31/2021)  Patient's oxygen saturation 80 to 85% on room air. ABG w/ pO2 of 52. CXR w/ pulmonary edema and right pleural effusion. IV Lasix 40 mg x 1 given in the ER. Continue Lasix 40 mg IV daily.   PT/OT  Check for O2 needs at discharge     # Acute on chronic diastolic HF exacerbation  # Chest pain/CAD  Cardiology consulted  Diurese  Fluid restriction  pBNP 2,615  Obtain TTE     # Right leg cellulitis  Patient has significant erythema, warmth, and tenderness of the R LE.  US negative for DVT  Continue clindamycin IV    # Macrocytic anemia  Check B12, folate     # Atrial fibrillation, permanent  Continue home metoprolol, apixaban.     # Hypothyroidism  Continue levothyroxine     # Dyslipidemia  Continue statin     # Depression  Continue duloxetine      Dispo/Discharge Plannin-3 midnights    Diet:  ADULT DIET Regular; Low Fat/Low Chol/High Fiber/2 gm Na; 1800 ml  DVT PPx: apixaban  Code status: DNR    Hospital Problems as of 2021 Date Reviewed: 2021        Codes Class Noted - Resolved POA    Chest pain ICD-10-CM: R07.9  ICD-9-CM: 786.50  2021 - Present Unknown        SOB (shortness of breath) ICD-10-CM: R06.02  ICD-9-CM: 786.05  2021 - Present Unknown        Acute exacerbation of CHF (congestive heart failure) (HCC) ICD-10-CM: I50.9  ICD-9-CM: 428.0  10/31/2021 - Present Unknown        Cellulitis of right leg ICD-10-CM: L03.115  ICD-9-CM: 682.6  10/31/2021 - Present Unknown        * (Principal) Acute hypoxemic respiratory failure (HCC) ICD-10-CM: J96.01  ICD-9-CM: 518.81  10/31/2021 - Present Unknown        Diastolic CHF, acute on chronic [facility]) ICD-10-CM: I50.33  ICD-9-CM: 428.33, 428.0  2017 - Present Yes        Atrial fibrillation (HCC) ICD-10-CM: I48.91  ICD-9-CM: 427.31  2011 - Present Yes        Hypothyroidism (Chronic) ICD-10-CM: E03.9  ICD-9-CM: 244.9  2011 - Present Yes        Coronary atherosclerosis of native coronary artery--PTCA 1997 LAD/diag (Chronic) ICD-10-CM: I25.10  ICD-9-CM: 414.01  2011 - Present Yes        Hyperlipemia (Chronic) ICD-10-CM: E78.5  ICD-9-CM: 272.4  3/29/2009 - Present Yes              Objective:     Patient Vitals for the past 24 hrs:   Temp Pulse Resp BP SpO2   21 1226 97.6 °F (36.4 °C) 83  (!) 104/53 100 %   21 0743 97.9 °F (36.6 °C) 82 16 (!) 152/78 100 %   21 0407 97.8 °F (36.6 °C) 78 16 (!) 112/59 96 %   21 0400  77      21 0000  66      10/31/21 2344 97.5 °F (36.4 °C) 68 16 (!) 108/58 97 %   10/31/21 2009 98 °F (36.7 °C) 67 18 118/89 90 %   10/31/21 2000  67      10/31/21 1725 97.5 °F (36.4 °C) 68 12 (!) 149/81 98 %   10/31/21 1532  (!) 59 11 (!) 167/70 96 %   10/31/21 1332  65 21 (!) 147/67    10/31/21 1309  (!) 56 21  95 %   10/31/21 1302  61 17 (!) 147/70    10/31/21 1253  61   95 %   10/31/21 1247  (!) 57 20  97 %   10/31/21 1244  63 16 (!) 175/74 94 %     Oxygen Therapy  O2 Sat (%): 100 % (11/01/21 1226)  Pulse via Oximetry: 78 beats per minute (11/01/21 0407)  O2 Device: Nasal cannula (11/01/21 0407)  Skin Assessment: Clean, dry, & intact (10/31/21 1945)  O2 Flow Rate (L/min): 2 l/min (11/01/21 0407)    Estimated body mass index is 25.23 kg/m² as calculated from the following:    Height as of this encounter: 5' (1.524 m). Weight as of this encounter: 58.6 kg (129 lb 3 oz). Intake/Output Summary (Last 24 hours) at 11/1/2021 1231  Last data filed at 11/1/2021 9759  Gross per 24 hour   Intake 180 ml   Output 400 ml   Net -220 ml         Physical Exam:   Blood pressure (!) 104/53, pulse 83, temperature 97.6 °F (36.4 °C), resp. rate 16, height 5' (1.524 m), weight 58.6 kg (129 lb 3 oz), SpO2 100 %. General:    No overt distress  Head:  Normocephalic, atraumatic  Eyes:  Sclerae appear normal.  Pupils equally round. ENT:  Nares appear normal, no drainage. Moist oral mucosa  Neck:  No restricted ROM. Trachea midline   CV:   RRR. No m/r/g. Lungs:   CTAB. No wheezing. Respirations even, unlabored at rest  Abdomen: Bowel sounds present. Soft, nontender, nondistended. Extremities: No cyanosis or clubbing. Erythema to R LE. Skin:     No rashes and normal coloration. Neuro:  A&Ox3, follows commands  Psych:  Normal mood and affect.       I have reviewed ordered lab tests and independently visualized imaging below:    Recent Labs:  Recent Results (from the past 48 hour(s))   EKG, 12 LEAD, INITIAL    Collection Time: 10/31/21  9:40 AM   Result Value Ref Range    Ventricular Rate 63 BPM    Atrial Rate 122 BPM    QRS Duration 112 ms    Q-T Interval 402 ms    QTC Calculation (Bezet) 411 ms    Calculated R Axis 59 degrees    Calculated T Axis 84 degrees    Diagnosis       !! AGE AND GENDER SPECIFIC ECG ANALYSIS !! Atrial fibrillation  Low voltage QRS  Cannot rule out Anteroseptal infarct , age undetermined  Abnormal ECG  When compared with ECG of 06-FEB-2018 13:07,  Atrial fibrillation has replaced Sinus rhythm    Confirmed by ST GUILLERMO READ MD (), DANIEL LARIOS ([id number]) on 10/31/2021 12:44:24 PM     CBC WITH AUTOMATED DIFF    Collection Time: 10/31/21  9:58 AM   Result Value Ref Range    WBC 6.9 4.3 - 11.1 K/uL    RBC 3.16 (L) 4.05 - 5.2 M/uL    HGB 10.4 (L) 11.7 - 15.4 g/dL    HCT 32.9 (L) 35.8 - 46.3 %    .1 (H) 79.6 - 97.8 FL    MCH 32.9 26.1 - 32.9 PG    MCHC 31.6 31.4 - 35.0 g/dL    RDW 14.7 (H) 11.9 - 14.6 %    PLATELET 944 001 - 232 K/uL    MPV 10.2 9.4 - 12.3 FL    ABSOLUTE NRBC 0.00 0.0 - 0.2 K/uL    DF AUTOMATED      NEUTROPHILS 64 43 - 78 %    LYMPHOCYTES 18 13 - 44 %    MONOCYTES 14 (H) 4.0 - 12.0 %    EOSINOPHILS 3 0.5 - 7.8 %    BASOPHILS 1 0.0 - 2.0 %    IMMATURE GRANULOCYTES 1 0.0 - 5.0 %    ABS. NEUTROPHILS 4.4 1.7 - 8.2 K/UL    ABS. LYMPHOCYTES 1.3 0.5 - 4.6 K/UL    ABS. MONOCYTES 1.0 0.1 - 1.3 K/UL    ABS. EOSINOPHILS 0.2 0.0 - 0.8 K/UL    ABS. BASOPHILS 0.0 0.0 - 0.2 K/UL    ABS. IMM. GRANS. 0.1 0.0 - 0.5 K/UL   METABOLIC PANEL, COMPREHENSIVE    Collection Time: 10/31/21  9:58 AM   Result Value Ref Range    Sodium 139 136 - 145 mmol/L    Potassium 4.2 3.5 - 5.1 mmol/L    Chloride 109 (H) 98 - 107 mmol/L    CO2 27 21 - 32 mmol/L    Anion gap 3 (L) 7 - 16 mmol/L    Glucose 163 (H) 65 - 100 mg/dL    BUN 28 (H) 8 - 23 MG/DL    Creatinine 1.16 (H) 0.6 - 1.0 MG/DL    GFR est AA 56 (L) >60 ml/min/1.73m2    GFR est non-AA 47 (L) >60 ml/min/1.73m2    Calcium 10.0 8.3 - 10.4 MG/DL    Bilirubin, total 0.4 0.2 - 1.1 MG/DL    ALT (SGPT) 14 12 - 65 U/L    AST (SGOT) 17 15 - 37 U/L    Alk.  phosphatase 150 (H) 50 - 136 U/L    Protein, total 7.2 6.3 - 8.2 g/dL    Albumin 3.1 (L) 3.2 - 4.6 g/dL    Globulin 4.1 (H) 2.3 - 3.5 g/dL    A-G Ratio 0.8 (L) 1.2 - 3.5     LIPASE    Collection Time: 10/31/21  9:58 AM   Result Value Ref Range    Lipase 109 73 - 393 U/L   TROPONIN-HIGH SENSITIVITY    Collection Time: 10/31/21  9:58 AM   Result Value Ref Range    Troponin-High Sensitivity 12.1 0 - 14 pg/mL   NT-PRO BNP    Collection Time: 10/31/21  9:58 AM   Result Value Ref Range    NT pro-BNP 2,615 (H) <450 PG/ML   COVID-19 RAPID TEST    Collection Time: 10/31/21 11:03 AM   Result Value Ref Range    Specimen source NASAL      COVID-19 rapid test Not detected NOTD     TROPONIN-HIGH SENSITIVITY    Collection Time: 10/31/21 11:42 AM   Result Value Ref Range    Troponin-High Sensitivity 12.4 0 - 14 pg/mL   BLOOD GAS, ARTERIAL POC    Collection Time: 10/31/21  1:53 PM   Result Value Ref Range    Device: ROOM AIR      pH (POC) 7.41 7.35 - 7.45      pCO2 (POC) 39.0 35 - 45 MMHG    pO2 (POC) 52 (L) 75 - 100 MMHG    HCO3 (POC) 24.7 22 - 26 MMOL/L    sO2 (POC) 87.0 (L) 95 - 98 %    Base excess (POC) 0.1 mmol/L    Allens test (POC) Positive      Site RIGHT RADIAL      Specimen type (POC) ARTERIAL      Performed by Abdi     CO2, POC 25 (H) 13 - 23 MMOL/L   METABOLIC PANEL, COMPREHENSIVE    Collection Time: 11/01/21  6:38 AM   Result Value Ref Range    Sodium 140 136 - 145 mmol/L    Potassium 4.2 3.5 - 5.1 mmol/L    Chloride 105 98 - 107 mmol/L    CO2 28 21 - 32 mmol/L    Anion gap 7 7 - 16 mmol/L    Glucose 94 65 - 100 mg/dL    BUN 32 (H) 8 - 23 MG/DL    Creatinine 1.25 (H) 0.6 - 1.0 MG/DL    GFR est AA 52 (L) >60 ml/min/1.73m2    GFR est non-AA 43 (L) >60 ml/min/1.73m2    Calcium 10.2 8.3 - 10.4 MG/DL    Bilirubin, total 0.4 0.2 - 1.1 MG/DL    ALT (SGPT) 14 12 - 65 U/L    AST (SGOT) 17 15 - 37 U/L    Alk.  phosphatase 151 (H) 50 - 136 U/L    Protein, total 7.5 6.3 - 8.2 g/dL    Albumin 3.2 3.2 - 4.6 g/dL    Globulin 4.3 (H) 2.3 - 3.5 g/dL    A-G Ratio 0.7 (L) 1.2 - 3.5     CBC WITH AUTOMATED DIFF    Collection Time: 11/01/21  6:38 AM   Result Value Ref Range    WBC 7.8 4.3 - 11.1 K/uL    RBC 3.40 (L) 4.05 - 5.2 M/uL    HGB 11.2 (L) 11.7 - 15.4 g/dL    HCT 35.3 (L) 35.8 - 46.3 %    .8 (H) 79.6 - 97.8 FL    MCH 32.9 26.1 - 32.9 PG    MCHC 31.7 31.4 - 35.0 g/dL    RDW 14.6 11.9 - 14.6 %    PLATELET 941 644 - 341 K/uL    MPV 10.2 9.4 - 12.3 FL    ABSOLUTE NRBC 0.00 0.0 - 0.2 K/uL    DF AUTOMATED      NEUTROPHILS 59 43 - 78 %    LYMPHOCYTES 22 13 - 44 %    MONOCYTES 14 (H) 4.0 - 12.0 %    EOSINOPHILS 3 0.5 - 7.8 %    BASOPHILS 1 0.0 - 2.0 %    IMMATURE GRANULOCYTES 1 0.0 - 5.0 %    ABS. NEUTROPHILS 4.6 1.7 - 8.2 K/UL    ABS. LYMPHOCYTES 1.8 0.5 - 4.6 K/UL    ABS. MONOCYTES 1.1 0.1 - 1.3 K/UL    ABS. EOSINOPHILS 0.2 0.0 - 0.8 K/UL    ABS. BASOPHILS 0.1 0.0 - 0.2 K/UL    ABS. IMM. GRANS. 0.1 0.0 - 0.5 K/UL       All Micro Results     Procedure Component Value Units Date/Time    COVID-19 RAPID TEST [[id number]] Collected: 10/31/21 1103    Order Status: Completed Specimen: Nasopharyngeal Updated: 10/31/21 1126     Specimen source NASAL        Comment: RAPID ONLY        COVID-19 rapid test Not detected        Comment:      The specimen is NEGATIVE for SARS-CoV-2, the novel coronavirus associated with COVID-19. A negative result does not rule out COVID-19. This test has been authorized by the FDA under an Emergency Use Authorization (EUA) for use by authorized laboratories. Fact sheet for Healthcare Providers: ConventionUpdate.co.nz  Fact sheet for Patients: ConventionUpdate.co.nz       Methodology: Isothermal Nucleic Acid Amplification               Other Studies:  DUPLEX LOWER EXT VENOUS RIGHT    Result Date: 10/31/2021  RIGHT LOWER EXTREMITY VENOUS DUPLEX ULTRASOUND. HISTORY:  Pain. TECHNIQUE: Direct skin-contact high resolution grayscale images, color-flow Doppler and duplex waveform analysis.  FINDINGS: There is compressibility, color-flow assignment and augmentation of the venous waveform with calf compression in the common femoral, superficial femoral and popliteal veins. Upper calf veins are unremarkable     Negative for sonographic evidence of deep venous thrombosis right lower extremity.        Current Meds:  Current Facility-Administered Medications   Medication Dose Route Frequency    apixaban (ELIQUIS) tablet 2.5 mg  2.5 mg Oral BID    ascorbic acid (vitamin C) (VITAMIN C) tablet 500 mg  500 mg Oral DAILY    buprenorphine (BUTRANS) 10 mcg/hour patch 1 Patch (Patient Supplied)  1 Patch TransDERmal Q7D    DULoxetine (CYMBALTA) capsule 60 mg  60 mg Oral DAILY    gabapentin (NEURONTIN) capsule 300 mg  300 mg Oral BID    gemfibroziL (LOPID) tablet 600 mg  600 mg Oral BID    latanoprost (XALATAN) 0.005 % ophthalmic solution 1 Drop  1 Drop Both Eyes QHS    levothyroxine (SYNTHROID) tablet 112 mcg  112 mcg Oral ACB    metoprolol succinate (TOPROL-XL) XL tablet 25 mg  25 mg Oral DAILY    nitroglycerin (NITROSTAT) tablet 0.4 mg  0.4 mg SubLINGual Q5MIN PRN    potassium chloride (K-DUR, KLOR-CON) SR tablet 20 mEq  20 mEq Oral BID    rOPINIRole (REQUIP) tablet 1 mg  1 mg Oral TID    rosuvastatin (CRESTOR) tablet 40 mg  40 mg Oral QHS    timolol (TIMOPTIC) 0.5 % ophthalmic solution 1 Drop  1 Drop Both Eyes DAILY    clindamycin (CLEOCIN) capsule 300 mg  300 mg Oral Q8H    sodium chloride (NS) flush 5-40 mL  5-40 mL IntraVENous Q8H    sodium chloride (NS) flush 5-40 mL  5-40 mL IntraVENous PRN    acetaminophen (TYLENOL) tablet 650 mg  650 mg Oral Q6H PRN    Or    acetaminophen (TYLENOL) suppository 650 mg  650 mg Rectal Q6H PRN    polyethylene glycol (MIRALAX) packet 17 g  17 g Oral DAILY PRN    ondansetron (ZOFRAN ODT) tablet 4 mg  4 mg Oral Q8H PRN    Or    ondansetron (ZOFRAN) injection 4 mg  4 mg IntraVENous Q6H PRN    magnesium sulfate 2 g/50 ml IVPB (premix or compounded)  2 g IntraVENous PRN    Saccharomyces boulardii (FLORASTOR) capsule 500 mg  500 mg Oral BID    furosemide (LASIX) injection 40 mg  40 mg IntraVENous DAILY       Signed:  Charity Hale NP    Part of this note may have been written by using a voice dictation software. The note has been proof read but may still contain some grammatical/other typographical errors.

## 2021-11-01 NOTE — PROGRESS NOTES
Problem: Gas Exchange - Impaired  Goal: *Absence of hypoxia  10/31/2021 2021 by Reanna Key RN  Outcome: Not Met  10/31/2021 2021 by Reanna Key RN  Outcome: Progressing Towards Goal     Problem: Patient Education: Go to Patient Education Activity  Goal: Patient/Family Education  10/31/2021 2021 by Reanna Key RN  Outcome: Not Met  10/31/2021 2021 by Reanna Key RN  Outcome: Progressing Towards Goal     Problem: Heart Failure: Day 1  Goal: Off Pathway (Use only if patient is Off Pathway)  10/31/2021 2021 by Reanna Key RN  Outcome: Not Met  10/31/2021 2021 by Reanna Key RN  Outcome: Progressing Towards Goal  Goal: Activity/Safety  10/31/2021 2021 by Reanna Key RN  Outcome: Not Met  10/31/2021 2021 by Reanna Key RN  Outcome: Progressing Towards Goal  Goal: Consults, if ordered  10/31/2021 2021 by Reanna Key RN  Outcome: Not Met  10/31/2021 2021 by Reanna Key RN  Outcome: Progressing Towards Goal  Goal: Diagnostic Test/Procedures  10/31/2021 2021 by Reanna Key RN  Outcome: Not Met  10/31/2021 2021 by Reanna Key RN  Outcome: Progressing Towards Goal  Goal: Nutrition/Diet  10/31/2021 2021 by Reanna Key RN  Outcome: Not Met  10/31/2021 2021 by Reanna Key RN  Outcome: Progressing Towards Goal  Goal: Discharge Planning  10/31/2021 2021 by Reanna Key RN  Outcome: Not Met  10/31/2021 2021 by Reanna Key RN  Outcome: Progressing Towards Goal  Goal: Medications  10/31/2021 2021 by Reanna Key RN  Outcome: Not Met  10/31/2021 2021 by Reanna Key RN  Outcome: Progressing Towards Goal  Goal: Respiratory  10/31/2021 2021 by Reanna Key RN  Outcome: Not Met  10/31/2021 2021 by Reanna Key RN  Outcome: Progressing Towards Goal  Goal: Treatments/Interventions/Procedures  10/31/2021 2021 by Reanna Key RN  Outcome: Not Met  10/31/2021 2021 by Estela William RN  Outcome: Progressing Towards Goal  Goal: Psychosocial  10/31/2021 2021 by Estela William RN  Outcome: Not Met  10/31/2021 2021 by Estela William RN  Outcome: Progressing Towards Goal  Goal: *Oxygen saturation within defined limits  10/31/2021 2021 by Estela William RN  Outcome: Not Met  10/31/2021 2021 by Estela William RN  Outcome: Progressing Towards Goal  Goal: *Hemodynamically stable  10/31/2021 2021 by Estela William RN  Outcome: Not Met  10/31/2021 2021 by Estela William RN  Outcome: Progressing Towards Goal  Goal: *Optimal pain control at patient's stated goal  10/31/2021 2021 by Estela William RN  Outcome: Not Met  10/31/2021 2021 by Estela William RN  Outcome: Progressing Towards Goal  Goal: *Anxiety reduced or absent  10/31/2021 2021 by Estela William RN  Outcome: Not Met  10/31/2021 2021 by Estela William RN  Outcome: Progressing Towards Goal

## 2021-11-01 NOTE — PROGRESS NOTES
ACUTE PHYSICAL THERAPY GOALS:  (Developed with and agreed upon by patient and/or caregiver. )  LTG:  (1.)Ms. Anita Guerrero will move from supine to sit and sit to supine , scoot up and down and roll side to side in bed with INDEPENDENT within 7 treatment day(s). (2.)Ms. Anita Guerrero will transfer from bed to chair and chair to bed with MODIFIED INDEPENDENCE using the least restrictive device within 7 treatment day(s). (3.)Ms. Anita Guerrero will ambulate with SUPERVISION for 150' feet with the least restrictive device within 7 treatment day(s). ________________________________________________________________________________________________      PHYSICAL THERAPY ASSESSMENT: Initial Assessment and Daily Note PT Treatment Day # 1      Dayne Tuttle is a 80 y.o. female   PRIMARY DIAGNOSIS: Acute hypoxemic respiratory failure (HCC)  Acute hypoxemic respiratory failure (HCC) [J96.01]  Acute exacerbation of CHF (congestive heart failure) (HCC) [I50.9]  Cellulitis of right leg [H01.592]       Reason for Referral:     ICD-10: Treatment Diagnosis: Other abnormalities of gait and mobility (R26.89)  Repeated Falls (R29.6)  INPATIENT: [place] / [place] / Product Type: Medicare /     ASSESSMENT:     REHAB RECOMMENDATIONS:   Recommendation to date pending progress:  [address] [place] [address]  Equipment:    To Be Determined     PRIOR LEVEL OF FUNCTION:  (Prior to Hospitalization) INITIAL/CURRENT LEVEL OF FUNCTION:  (Most Recently Demonstrated)   Bed Mobility:   Independent  Sit to Stand:   Modified Independent  Transfers:   Modified Independent  Gait/Mobility:   Modified Independent Bed Mobility:   Not tested  Sit to Stand:   Standby Assistance  Transfers:   Standby Assistance  Gait/Mobility:   Standby Assistance     ASSESSMENT:  Ms. Anita Guerrero was sitting in recliner on 2L. She lives in an snf and is modified independent in the facility, ambulates with a rollator.   Today she required SBA for transfers/gait. She ambulated on room air and SpO2 dropped to upper 80's. Recovered quickly with rest and reapplication of 2L . Due to patient's advanced age she is at risk of declining quickly with limited mobility. Ms. Deya Alba would benefit from skilled physical therapy (medically necessary) to address her deficits and maximize her function. SUBJECTIVE:   Ms. Deya Alba states, \"I'm in some kind of hospital.\"    SOCIAL HISTORY/LIVING ENVIRONMENT: She lives in an [facility] and is modified independent in the facility, ambulates with a rollator.      OBJECTIVE:     PAIN: VITAL SIGNS: LINES/DRAINS:   Pre Treatment: Pain Screen  Pain Scale 1: Numeric (0 - 10)  Pain Intensity 1: 0  Post Treatment: 0      O2 Device: Nasal cannula     GROSS EVALUATION:  B LE's Within Functional Limits Abnormal/ Functional Abnormal/ Non-Functional (see comments) Not Tested Comments:   AROM [] [x] [] []    PROM [] [] [] []    Strength [x] [] [] []    Balance [] [x] [] []    Posture [] [x] [] []    Sensation [] [x] [] [] Peripheral neuropathy   Coordination [] [] [] []    Tone [] [] [] []    Edema [] [] [] []    Activity Tolerance [] [] [] []     [] [] [] []      COGNITION/  PERCEPTION: Intact Impaired   (see comments) Comments:   Orientation [] [x]    Vision [] []    Hearing [] [x]    Command Following [] [x]    Safety Awareness [] []     [] []      MOBILITY: I Mod I S SBA CGA Min Mod Max Total  NT x2 Comments:   Bed Mobility    Rolling [] [] [] [] [] [] [] [] [] [x] []    Supine to Sit [] [] [] [] [] [] [] [] [] [x] []    Scooting [] [] [] [] [] [] [] [] [] [x] []    Sit to Supine [] [] [] [] [] [] [] [] [] [x] []    Transfers    Sit to Stand [] [] [] [x] [] [] [] [] [] [] []    Bed to Chair [] [] [] [] [] [] [] [] [] [] []    Stand to Sit [] [] [] [x] [] [] [] [] [] [] []    I=Independent, Mod I=Modified Independent, S=Supervision, SBA=Standby Assistance, CGA=Contact Guard Assistance,   Min=Minimal Assistance, Mod=Moderate Assistance, Max=Maximal Assistance, Total=Total Assistance, NT=Not Tested  GAIT: I Mod I S SBA CGA Min Mod Max Total  NT x2 Comments:   Level of Assistance [] [] [] [x] [] [] [] [] [] [] []    Distance 40'    DME Rolling Walker and Gait Belt    Gait Quality Slow, flexed posture    Weightbearing Status N/A     I=Independent, Mod I=Modified Independent, S=Supervision, SBA=Standby Assistance, CGA=Contact Guard Assistance,   Min=Minimal Assistance, Mod=Moderate Assistance, Max=Maximal Assistance, Total=Total Assistance, NT=Not [facility]       How much difficulty does the patient currently have. .. Unable A Lot A Little None   1. Turning over in bed (including adjusting bedclothes, sheets and blankets)? [] 1   [] 2   [x] 3   [] 4   2. Sitting down on and standing up from a chair with arms ( e.g., wheelchair, bedside commode, etc.)   [] 1   [] 2   [x] 3   [] 4   3. Moving from lying on back to sitting on the side of the bed? [] 1   [] 2   [x] 3   [] 4   How much help from another person does the patient currently need. .. Total A Lot A Little None   4. Moving to and from a bed to a chair (including a wheelchair)? [] 1   [] 2   [x] 3   [] 4   5. Need to walk in hospital room? [] 1   [] 2   [x] 3   [] 4   6. Climbing 3-5 steps with a railing? [] 1   [] 2   [x] 3   [] 4   © 2007, Trustees of [address] [place] [address] [address] [id number], under license to [employer]. All rights reserved     Score:  Initial: 18 Most Recent: X (Date: -- )    Interpretation of Tool:  Represents activities that are increasingly more difficult (i.e. Bed mobility, Transfers, Gait). PLAN:   FREQUENCY/DURATION: PT Plan of Care: 3 times/week for duration of hospital stay or until stated goals are met, whichever comes first.    PROBLEM LIST:   (Skilled intervention is medically necessary to address:)  1. Decreased Activity Tolerance  2. Decreased Balance  3.  Decreased Gait Ability  4. Decreased Transfer Abilities   INTERVENTIONS PLANNED:   (Benefits and precautions of physical therapy have been discussed with the patient.)  1. Therapeutic Activity  2. Therapeutic Exercise/HEP  3. Neuromuscular Re-education  4. Gait Training  5. Manual Therapy  6. Education     TREATMENT:     EVALUATION: Low Complexity : (Untimed Charge)    TREATMENT:   ($$ Therapeutic Exercises: 8-22 mins    )  Therapeutic Exercise (10 Minutes): Therapeutic exercises noted below to improve functional activity tolerance, strength and mobility.      TREATMENT GRID:      DATE: 11/01/21       Ambulation        Hip Flexion x10 AB       Long Arc Quads x10 AB       Hip ab/ad        Heel Raises x10 AB       Toe Raises x10 AB                                Key:  A=active, AA=active assisted, P=passive, B=bilaterally, R=right, L=left   DF=dorsiflexion, PF=plantarflexion    AFTER TREATMENT POSITION/PRECAUTIONS:  Chair, Needs within reach, RN notified and Visitors at bedside    INTERDISCIPLINARY COLLABORATION:  RN/PCT and PT/PTA    TOTAL TREATMENT DURATION:  PT Patient Time In/Time Out  Time In: 1130  Time Out: [address], PT, DPT

## 2021-11-01 NOTE — PROGRESS NOTES
11/01/21 0407   Dual Skin Pressure Injury Assessment   Dual Skin Pressure Injury Assessment WDL   Second Care Provider (Based on [address] [place] [address]) Luis RN    Skin Integumentary   Skin Integumentary (WDL) X    Pressure  Injury Documentation No Pressure Injury Noted-Pressure Ulcer Prevention Initiated   Skin Integrity Other (comment)  (RLE redness, sacrem pink and blanchable)   Turgor Non-tenting   Skin Condition/Temp Dry; Warm   Skin Color Pale   Hair Growth Sparce   Nails X   Exceptions to WDL Thick   Lucian Score: 16  Skin assessment completed with second nurse, Zaki Caal RN.

## 2021-11-01 NOTE — PROGRESS NOTES
ACUTE OT GOALS:  (Developed with and agreed upon by patient and/or caregiver.)  1. Pt will toilet with SBA   2. Pt will complete functional mobility for ADLs with SBA using AD   3. Pt will complete lower body dressing (with the exception of socks and shoes) with SBA using AE as needed  4. Pt will complete grooming and hygiene at sink with SBA  5. Pt will demonstrate independence with HEP to promote increased BUE strength and functional use for ADLs  6. Pt will tolerate 23 minutes functional activity with min or fewer rest breaks to promote increased endurance for ADLs  7.  Pt will independently demonstrate/ verbalize 2+ energy conservation techniques to promote increased endurance for ADLs      Timeframe: 7 days      OCCUPATIONAL THERAPY ASSESSMENT: Initial Assessment and Daily Note OT Treatment Day # 1    Elease Dakins is a 80 y.o. female   PRIMARY DIAGNOSIS: Acute hypoxemic respiratory failure (HCC)  Acute hypoxemic respiratory failure (HCC) [J96.01]  Acute exacerbation of CHF (congestive heart failure) (HCC) [I50.9]  Cellulitis of right leg [J47.021]       Reason for Referral:  Generalized weakness,  hypoxia  ICD-10: Treatment Diagnosis: Generalized Muscle Weakness (M62.81)  [place]PAT[place] / [place] / Product Type: Medicare /   ASSESSMENT:     REHAB RECOMMENDATIONS:   Recommendation to date pending progress:  [address] [place] [address]  Equipment:    None     PRIOR LEVEL OF FUNCTION:  (Prior to Hospitalization)  INITIAL/CURRENT LEVEL OF FUNCTION:  (Based on today's evaluation)   Bathing:   Minimal Assistance  Dressing:   Minimal Assistance  Feeding/Grooming:   Independent  Toileting:   Independent  Functional Mobility:   Modified Independent Bathing:   Minimal Assistance  Dressing:   Moderate Assistance  Feeding/Grooming:   Contact Guard Assistance  Toileting:   Contact Guard Assistance  Functional Mobility:   Contact Guard Assistance     ASSESSMENT:  Ms. Nickie Lr presented generally weak with deficits in strength, endurance, mobility, and balance impacting ADLs. Pt mobilized with CGA using RW, completed grooming/ hygiene seated with set up. Pt endorsed fatigue with activity and required extra time and rest breaks to complete. Noted to have baseline tremor. Pt is below her functional baseline and would benefit from skilled OT services to address deficits. SUBJECTIVE:   Ms. Nickie Lr states, \"I'm not sure what the date is. \"    SOCIAL HISTORY/LIVING ENVIRONMENT: Lives at [facility], assisted with bathing and socks and shoes, RW. WIS, shower chair, grab bars, elevated toilet       OBJECTIVE:     PAIN: VITAL SIGNS: LINES/DRAINS:   Pre Treatment: Pain Screen  Pain Scale 1: Numeric (0 - 10)  Pain Intensity 1: 0  Post Treatment: 0   Purewick  O2 Device: Nasal cannula     GROSS EVALUATION:  BUE Within Functional Limits Abnormal/ Functional Abnormal/ Non-Functional (see comments) Not Tested Comments:   AROM [x] [] [] []    PROM [] [] [] []    Strength [] [x] [] []    Balance [] [x] [] []    Posture [] [] [] []    Sensation [] [] [] []    Coordination [x] [] [] []    Tone [] [] [] []    Edema [] [] [] []    Activity Tolerance [] [x] [] []     [] [] [] []      COGNITION/  PERCEPTION: Intact Impaired   (see comments) Comments:   Orientation [] [x]    Vision [] [] glasses   Hearing [x] []    Judgment/ Insight [x] []    Attention [x] []    Memory [] [x]    Command Following [x] []    Emotional Regulation [x] []     [] []      ACTIVITIES OF DAILY LIVING: I Mod I S SBA CGA Min Mod Max Total NT Comments   BASIC ADLs:              Bathing/ Showering [] [] [] [] [] [] [] [] [] []    Toileting [] [] [] [] [] [] [] [] [] []    Dressing [] [] [] [] [] [x] [] [] [] []    Feeding [] [] [] [] [] [] [] [] [] []    Grooming [] [] [] [] [x] [] [] [] [] []    Personal Device Care [] [] [] [] [] [] [] [] [] []    Functional Mobility [] [] [] [] [x] [] [] [] [] []    I=Independent, Mod I=Modified Independent, S=Supervision, SBA=Standby Assistance, CGA=Contact Guard Assistance,   Min=Minimal Assistance, Mod=Moderate Assistance, Max=Maximal Assistance, Total=Total Assistance, NT=Not Tested    MOBILITY: I Mod I S SBA CGA Min Mod Max Total  NT x2 Comments:   Supine to sit [] [] [] [] [x] [] [] [] [] [] []    Sit to supine [] [] [] [] [] [] [] [] [] [] []    Sit to stand [] [] [] [] [x] [] [] [] [] [] []    Bed to chair [] [] [] [] [x] [] [] [] [] [] []    I=Independent, Mod I=Modified Independent, S=Supervision, SBA=Standby Assistance, CGA=Contact Guard Assistance,   Min=Minimal Assistance, Mod=Moderate Assistance, Max=Maximal Assistance, Total=Total Assistance, NT=Not [employer] 6 Clicks   Daily Activity Inpatient Short Form        How much help from another person does the patient currently need. .. Total A Lot A Little None   1. Putting on and taking off regular lower body clothing? [] 1   [] 2   [x] 3   [] 4   2. Bathing (including washing, rinsing, drying)? [] 1   [] 2   [x] 3   [] 4   3. Toileting, which includes using toilet, bedpan or urinal?   [] 1   [] 2   [x] 3   [] 4   4. Putting on and taking off regular upper body clothing? [] 1   [] 2   [x] 3   [] 4   5. Taking care of personal grooming such as brushing teeth? [] 1   [] 2   [] 3   [x] 4   6. Eating meals? [] 1   [] 2   [] 3   [x] 4   © 2007, Trustees of [facility] [employer], under license to [employer]. All rights reserved     Score:  Initial: 20 Most Recent: X (Date: -- )   Interpretation of Tool:  Represents activities that are increasingly more difficult (i.e. Bed mobility, Transfers, Gait). PLAN:   FREQUENCY/DURATION: OT Plan of Care: 3 times/week for duration of hospital stay or until stated goals are met, whichever comes first.    PROBLEM LIST:   (Skilled intervention is medically necessary to address:)  1. Decreased ADL/Functional Activities  2. Decreased Activity Tolerance  3.  Decreased Balance  4. Decreased Strength  5. Decreased Transfer Abilities   INTERVENTIONS PLANNED:   (Benefits and precautions of occupational therapy have been discussed with the patient.)  1. Self Care Training  2. Therapeutic Activity  3. Therapeutic Exercise/HEP  4. Neuromuscular Re-education  5. Education     TREATMENT:     EVALUATION: Moderate Complexity : (Untimed Charge)    TREATMENT:   ($$ Self Care/Home Management: 23-37 mins    )  Self Care (23 Minutes): Self care including Lower Body Dressing, Grooming and Energy Conservation Training to increase independence and decrease level of assistance required.     TREATMENT GRID:  N/A    AFTER TREATMENT POSITION/PRECAUTIONS:  Chair, Needs within reach, RN notified and Visitors at bedside    INTERDISCIPLINARY COLLABORATION:  RN/PCT    TOTAL TREATMENT DURATION:  OT Patient Time In/Time Out  Time In: 1004  Time Out: [address] Trudi Olivares

## 2021-11-01 NOTE — H&P
[facility] Cardiology Evaluation                 Date of  Admission: 10/31/2021  9:27 AM     Primary Care Physician: Dr. Storm Kramer  Primary Cardiologist: Dr. Ramo Farley  Referring Physician: Hospitalist   Supervising Cardiologist: Dr. Shavonne Oro    Reason for cardiac evaluation: chest pain and SOB      Alysha Bella is a 80 y.o. female with prior h/o CAD s/p CABG in 2011 prior PTCA to LAD/Diagonal in 1997, CVA, HTN, HLP, Permanent AF on low dose eliquis, chronic diastolic dysfunction, and chronic LBBB. Patient presented to ED at [facility] by EMS from [facility] because of chest heaviness. She was given sublingual nitro x2 and her chest pain improved. Patient was given aspirin in route to the hospital.  She denies shortness of breath however oxygen levels in the ER was found to be 80 to 85% on RA. Labs in ED showed HS trop neg x2, pBNP 2615, chest with Pulmonary edema with a right pleural effusion, EKG with controlled A. Fib. Hospitalist admitted patient with acute resp failure, acute on chronic diastolic HF, and right leg cellulitis. She was started on IV lasix. Cardiology was consulted for chest pain and SOB. She reports chest pain been noted intermittently for several weeks, unable to describe but thinks sharp, achy and tight at times, lasting mins to hours. She did report NTG helped yesterday. Also she reports lower ext edema days ago with SOB but unsure about weight gain.          Patient Active Problem List   Diagnosis Code    Hyperlipemia E78.5    Mitral valve insufficiency I34.0    Coronary atherosclerosis of native coronary artery--PTCA 1997 LAD/diag I25.10    Hypothyroidism E03.9    Atrial fibrillation (HCC) I48.91    Pleural effusion, bilateral J90    Anemia associated with acute blood loss D62    Hyponatremia E87.1    HEMOTHORAX ON LEFT  J94.2    Leucocytosis D72.829    CAD (coronary artery disease) I25.10    S/p thoracotomy- evauation of hemothorax Z98.890    Constipation- no BM since 7/30 K59.00    GI bleed K92.2    Leukocytosis D72.829    Irene-Starr tear K22.6    Paroxysmal atrial fibrillation (HCC) I48.0    S/P CABG (coronary artery bypass graft) Z95.1    Unstable angina ([facility]) I20.0    Localized edema R60.0    Acute respiratory failure with hypoxia ([facility]) N93.58    Diastolic CHF, acute on chronic ([facility]) I50.33    Acute kidney failure (HCC) N17.9    Acute heart failure ([facility]) I50.9    Accelerated hypertension I10    Left bundle branch block P03.9    Diastolic CHF, chronic ([facility]) I50.32    Pneumonia due to methicillin resistant Staphylococcus aureus ([facility]) J15.212    Edema R60.9    Heart failure ([facility]) I50.9    Hypercalcemia E83.52    Elevated PTHrP level R79.89    Acute exacerbation of CHF (congestive heart failure) ([facility]) I50.9    Cellulitis of right leg L03.115    Acute hypoxemic respiratory failure ([facility]) J96.01    Chest pain R07.9    SOB (shortness of breath) R06.02       Past Medical History:   Diagnosis Date    Anemia associated with acute blood loss 7/24/2011    Atrial fibrillation ([address] 7/13/2011    CAD (coronary artery disease) 1997    MI, PCI , CABG 7/13/2011    Chronic diastolic heart failure ([address] 10/29/2015    Chronic pain     Constipation- no BM since 7/30 8/5/2011    Coronary atherosclerosis of native coronary artery--PTCA 1997 LAD/diag 7/1/2011    CVA (cerebrovascular accident) ([address] 3/29/2009    mild memory issues, rls, afib    Diastolic CHF, acute on chronic ([address] 12/25/2017    GI bleed 1/11/2015    HEMOTHORAX ON LEFT  7/24/2011    Hyperlipemia 3/29/2009    Hypertension 3/29/2009    Hypertension, benign 10/29/2015    Hyponatremia 7/24/2011    Hypothyroidism 7/7/2011    Leucocytosis 8/3/2011    Leukocytosis 1/11/2015    Irene-Starr tear 1/12/2015    Mitral valve insufficiency 4/2/2009    Paroxysmal atrial fibrillation ([address] 10/29/2015    Pleural effusion, bilateral 7/23/2011    Right thorocetesis 1200ml removed 7/23/11 and 1000ml removed 7/28/11 Left thorocentesis 1100ml removed 7/23/11     Pneumonia due to methicillin resistant Staphylococcus aureus ([facility] 1/17/2019    S/p thoracotomy- evauation of hemothorax 8/4/2011    Unspecified hypothyroidism 3/30/2009      Past Surgical History:   Procedure Laterality Date    HX BACK SURGERY      HX ORTHOPAEDIC  4 years ago    neck    HX ORTHOPAEDIC  10 years    back    HX OTHER SURGICAL      steroid injection neck, 2 weeks ago    GA CABG, ARTERY-VEIN, THREE  7/7/2011     Allergies   Allergen Reactions    Versed [Midazolam] Anxiety     Delirium       Family History   Problem Relation Age of Onset    Heart Attack Father 36        Current Facility-Administered Medications   Medication Dose Route Frequency    apixaban (ELIQUIS) tablet 2.5 mg  2.5 mg Oral BID    ascorbic acid (vitamin C) (VITAMIN C) tablet 500 mg  500 mg Oral DAILY    buprenorphine (BUTRANS) 10 mcg/hour patch 1 Patch (Patient Supplied)  1 Patch TransDERmal Q7D    DULoxetine (CYMBALTA) capsule 60 mg  60 mg Oral DAILY    gabapentin (NEURONTIN) capsule 300 mg  300 mg Oral BID    gemfibroziL (LOPID) tablet 600 mg  600 mg Oral BID    latanoprost (XALATAN) 0.005 % ophthalmic solution 1 Drop  1 Drop Both Eyes QHS    levothyroxine (SYNTHROID) tablet 112 mcg  112 mcg Oral ACB    metoprolol succinate (TOPROL-XL) XL tablet 25 mg  25 mg Oral DAILY    nitroglycerin (NITROSTAT) tablet 0.4 mg  0.4 mg SubLINGual Q5MIN PRN    potassium chloride (K-DUR, KLOR-CON) SR tablet 20 mEq  20 mEq Oral BID    rOPINIRole (REQUIP) tablet 1 mg  1 mg Oral TID    rosuvastatin (CRESTOR) tablet 40 mg  40 mg Oral QHS    timolol (TIMOPTIC) 0.5 % ophthalmic solution 1 Drop  1 Drop Both Eyes DAILY    sodium chloride (NS) flush 5-40 mL  5-40 mL IntraVENous Q8H    sodium chloride (NS) flush 5-40 mL  5-40 mL IntraVENous PRN    acetaminophen (TYLENOL) tablet 650 mg  650 mg Oral Q6H PRN    Or    acetaminophen (TYLENOL) suppository 650 mg  650 mg Rectal Q6H PRN    polyethylene glycol (MIRALAX) packet 17 g  17 g Oral DAILY PRN    ondansetron (ZOFRAN ODT) tablet 4 mg  4 mg Oral Q8H PRN    Or    ondansetron (ZOFRAN) injection 4 mg  4 mg IntraVENous Q6H PRN    magnesium sulfate 2 g/50 ml IVPB (premix or compounded)  2 g IntraVENous PRN    clindamycin (CLEOCIN) 600mg D5W 50mL IVPB (premix)  600 mg IntraVENous Q8H    Saccharomyces boulardii (FLORASTOR) capsule 500 mg  500 mg Oral BID    furosemide (LASIX) injection 40 mg  40 mg IntraVENous DAILY       Review of Systems   Constitutional: Negative for diaphoresis and malaise/fatigue. HENT: Negative for congestion. Cardiovascular: Positive for chest pain and leg swelling. Negative for claudication, cyanosis, dyspnea on exertion, irregular heartbeat, near-syncope, orthopnea, palpitations, paroxysmal nocturnal dyspnea and syncope. Respiratory: Positive for shortness of breath. Negative for cough and wheezing. Endocrine: Negative for cold intolerance and heat intolerance. Hematologic/Lymphatic: Does not bruise/bleed easily. Skin: Negative for nail changes. Neurological: Negative for dizziness, headaches and weakness.         Physical Exam  Vitals:    11/01/21 0400 11/01/21 0407 11/01/21 0623 11/01/21 0743   BP:  (!) 112/59  (!) 152/78   Pulse: 77 78  82   Resp:  16  16   Temp:  97.8 °F (36.6 °C)  97.9 °F (36.6 °C)   SpO2:  96%  100%   Weight:   58.6 kg (129 lb 3 oz)    Height:           Physical Exam:  General: Well Developed, Well Nourished, No Acute Distress  HEENT: pupils equal and round, no abnormalities noted  Neck: supple, + JVD, no carotid bruits  Heart: S1S2 with RRR with + murmur apex  Lungs: Crackles in bases  Abd: soft, nontender, nondistended, with good bowel sounds  Ext: warm, + trace edema in LLE with redness , calves supple/nontender, pulses 2+ bilaterally  Skin: warm and dry  Psychiatric: Normal mood and affect  Neurologic: Alert and oriented X 3 but forgetful Cardiographics    Telemetry:   ECG: A. Fib rate controlled   Echocardiogram: 2019 showed  -  Left ventricle: Systolic function was normal. Ejection fraction was  estimated in the range of 55 % to 60 %. There were no regional wall motion abnormalities.   -  Right ventricle: The size was at the upper limits of normal. There was mild to moderate pulmonary artery hypertension.-  Left atrium: The atrium was moderately to markedly dilated. -  Right atrium: The atrium was moderately to markedly dilated. -  Inferior vena cava, hepatic veins: The respirophasic change in diameter was more than 50%. -  Aortic valve: The valve was trileaflet. Leaflets exhibited mild sclerosis. There was mild regurgitation.-  Mitral valve: There was mild annular calcification. There was moderate to severe regurgitation.-  Tricuspid valve: There was moderate to severe regurgitation.   -  Pulmonic valve: There was moderate regurgitation. Labs:   Recent Labs     10/31/21  0958      K 4.2   BUN 28*   CREA 1.16*   *   WBC 6.9   HGB 10.4*   HCT 32.9*           Assessment/Plan:     Assessment:      Principal Problem:    Acute hypoxemic respiratory failure ([address] (10/31/2021)- likely from volume overload; per primary team     Active Problems:    Hyperlipemia (3/29/2009)- on statin       Coronary atherosclerosis of native coronary artery--PTCA 1997 LAD/diag (7/1/2011)- chest pain been chronic for weeks. Continue home meds BB and statin. HS trop neg x2, check echo. Hypothyroidism (7/7/2011)      Atrial fibrillation ([address] (7/13/2011)- permanent; continue BB and low dose eliquis       Diastolic CHF, acute on chronic (HCC) (12/25/2017)      Acute exacerbation of CHF (congestive heart failure) ([address] (10/31/2021)- ? Secondary to pul HTN or valvular disease. Continue IV lasix.   Repeat echo today       Cellulitis of right leg (10/31/2021)- on IV abx      Chest pain (11/1/2021)- atypical been intermittent for weeks; HS trop neg, check echo. Continue home meds       SOB (shortness of breath) (11/1/2021)- see above. We appreciate the opportunity to participate in this patient's care. Isis Vargas, UBALDO  Supervising MD: Dr. Juliann Fink    I have personally seen and examined patient and agree with above assessment. I agree and confirm with findings with additional details/exceptions as listed below:    History of coronary disease with prior CABG (in 2011; prior PTCA to LAD/Diagonal in 1997). Other history of hypertension, hyperlipidemia, permanent atrial fibrillation on anticoagulation. Patient lives at an assisted living and was brought to the ED by EMS due to complaints of chest discomfort. Patient with baseline dementia but is a difficult historian. History obtained from daughter. Patient states she has had chest discomfort which has lasted for days. States currently not present. States sharp to dull. Denies any definite exertional symptoms. On initial presentation, negative high-sensitivity troponins of 12.1 and 12.4. Also noted with some hypoxia on presentation with O2 sats 80 to 85% on room air. Patient denied any dyspnea. Chest x-ray suggestive of possible pulmonary edema with a noted right pleural effusion. Prior noted echocardiogram from 7/2019 with preserved EF and stated moderate to severe MR/TR. Physical exam  Cardiovascular irregularly irregular with variable S1, apical holosystolic murmur, lungs with minimal basilar rales, extremities with trace edema    Plan    Dyspnea  -Likely multifactorial.  Exam currently not grossly volume overloaded. Reviewed chest x-ray imaging independently. Continue IV Lasix for now. Pending echocardiogram to further evaluate valvular heart disease.  -Wean O2 as tolerated    Coronary disease  -Prior CABG. Chest discomfort appears atypical with negative troponins. Defer any consideration for ischemic work-up at this time. Atrial fibrillation  -Permanent.   Adequately rate controlled. Continue anticoagulation. Hyperlipidemia  -Continue statin therapy. Further recommendations pending clinical course. Discussed with patient's daughter.     Goldie New MD  11/1/2021

## 2021-11-01 NOTE — PROGRESS NOTES
CM spoke with patient and daughter, Tom Garcia, at bedside. Demographics verified and updated. Patient is a resident at [address] [place] [address] . Patient states she ambulates with a walker and is normally able to independently manage bathing and dressing. Facility staff manages medications. Patient plans to return to [facility] at discharge. PT eval complete; HH recommended. CM placed call to [facility]; spoke with Adali Oliva [phone]. Kiersten Han states patient is currently receiving HH with [facility], and they will resume at discharge. She requests to be notified by phone when patient is ready for discharge and for a copy of discharge summary/orders, prescriptions for all new meds, and rapid COVID test be provided to facility. Patient's daughter plans to transport patient back to [facility]. CM will follow to assist with discharge needs. Discharge plan: Return to [facility] [place].

## 2021-11-02 LAB
ANION GAP SERPL CALC-SCNC: 5 MMOL/L (ref 7–16)
BUN SERPL-MCNC: 39 MG/DL (ref 8–23)
CALCIUM SERPL-MCNC: 10.1 MG/DL (ref 8.3–10.4)
CHLORIDE SERPL-SCNC: 106 MMOL/L (ref 98–107)
CO2 SERPL-SCNC: 27 MMOL/L (ref 21–32)
CREAT SERPL-MCNC: 1.26 MG/DL (ref 0.6–1)
ERYTHROCYTE [DISTWIDTH] IN BLOOD BY AUTOMATED COUNT: 14.5 % (ref 11.9–14.6)
FOLATE SERPL-MCNC: 11.8 NG/ML (ref 3.1–17.5)
GLUCOSE SERPL-MCNC: 92 MG/DL (ref 65–100)
HCT VFR BLD AUTO: 32.9 % (ref 35.8–46.3)
HGB BLD-MCNC: 10.1 G/DL (ref 11.7–15.4)
MCH RBC QN AUTO: 31.4 PG (ref 26.1–32.9)
MCHC RBC AUTO-ENTMCNC: 30.7 G/DL (ref 31.4–35)
MCV RBC AUTO: 102.2 FL (ref 79.6–97.8)
NRBC # BLD: 0 K/UL (ref 0–0.2)
PLATELET # BLD AUTO: 257 K/UL (ref 150–450)
PMV BLD AUTO: 9.8 FL (ref 9.4–12.3)
POTASSIUM SERPL-SCNC: 4.1 MMOL/L (ref 3.5–5.1)
RBC # BLD AUTO: 3.22 M/UL (ref 4.05–5.2)
SODIUM SERPL-SCNC: 138 MMOL/L (ref 136–145)
VIT B12 SERPL-MCNC: 517 PG/ML (ref 193–986)
WBC # BLD AUTO: 7 K/UL (ref 4.3–11.1)

## 2021-11-02 PROCEDURE — 85027 COMPLETE CBC AUTOMATED: CPT

## 2021-11-02 PROCEDURE — 82607 VITAMIN B-12: CPT

## 2021-11-02 PROCEDURE — 97530 THERAPEUTIC ACTIVITIES: CPT

## 2021-11-02 PROCEDURE — 74011250637 HC RX REV CODE- 250/637: Performed by: STUDENT IN AN ORGANIZED HEALTH CARE EDUCATION/TRAINING PROGRAM

## 2021-11-02 PROCEDURE — 99232 SBSQ HOSP IP/OBS MODERATE 35: CPT | Performed by: INTERNAL MEDICINE

## 2021-11-02 PROCEDURE — 74011250637 HC RX REV CODE- 250/637: Performed by: HOSPITALIST

## 2021-11-02 PROCEDURE — 82746 ASSAY OF FOLIC ACID SERUM: CPT

## 2021-11-02 PROCEDURE — 80048 BASIC METABOLIC PNL TOTAL CA: CPT

## 2021-11-02 PROCEDURE — 74011000250 HC RX REV CODE- 250: Performed by: HOSPITALIST

## 2021-11-02 PROCEDURE — 36415 COLL VENOUS BLD VENIPUNCTURE: CPT

## 2021-11-02 PROCEDURE — 77030038269 HC DRN EXT URIN PURWCK BARD -A

## 2021-11-02 PROCEDURE — 65660000000 HC RM CCU STEPDOWN

## 2021-11-02 PROCEDURE — 74011250636 HC RX REV CODE- 250/636: Performed by: HOSPITALIST

## 2021-11-02 RX ADMIN — LEVOTHYROXINE SODIUM 112 MCG: 0.11 TABLET ORAL at 05:39

## 2021-11-02 RX ADMIN — POTASSIUM CHLORIDE 20 MEQ: 20 TABLET, EXTENDED RELEASE ORAL at 18:02

## 2021-11-02 RX ADMIN — GABAPENTIN 300 MG: 300 CAPSULE ORAL at 22:01

## 2021-11-02 RX ADMIN — ROPINIROLE HYDROCHLORIDE 1 MG: 1 TABLET, FILM COATED ORAL at 22:01

## 2021-11-02 RX ADMIN — LATANOPROST 1 DROP: 50 SOLUTION OPHTHALMIC at 22:01

## 2021-11-02 RX ADMIN — Medication 10 ML: at 22:01

## 2021-11-02 RX ADMIN — CLINDAMYCIN HYDROCHLORIDE 300 MG: 150 CAPSULE ORAL at 14:30

## 2021-11-02 RX ADMIN — Medication 500 MG: at 18:01

## 2021-11-02 RX ADMIN — GEMFIBROZIL 600 MG: 600 TABLET ORAL at 10:27

## 2021-11-02 RX ADMIN — APIXABAN 2.5 MG: 2.5 TABLET, FILM COATED ORAL at 10:27

## 2021-11-02 RX ADMIN — APIXABAN 2.5 MG: 2.5 TABLET, FILM COATED ORAL at 18:01

## 2021-11-02 RX ADMIN — OXYCODONE HYDROCHLORIDE AND ACETAMINOPHEN 500 MG: 500 TABLET ORAL at 10:27

## 2021-11-02 RX ADMIN — CLINDAMYCIN HYDROCHLORIDE 300 MG: 150 CAPSULE ORAL at 05:39

## 2021-11-02 RX ADMIN — Medication 10 ML: at 05:39

## 2021-11-02 RX ADMIN — POTASSIUM CHLORIDE 20 MEQ: 20 TABLET, EXTENDED RELEASE ORAL at 10:27

## 2021-11-02 RX ADMIN — FUROSEMIDE 40 MG: 10 INJECTION, SOLUTION INTRAMUSCULAR; INTRAVENOUS at 10:27

## 2021-11-02 RX ADMIN — ROSUVASTATIN CALCIUM 40 MG: 20 TABLET, COATED ORAL at 22:53

## 2021-11-02 RX ADMIN — GABAPENTIN 300 MG: 300 CAPSULE ORAL at 10:27

## 2021-11-02 RX ADMIN — TIMOLOL MALEATE 1 DROP: 5 SOLUTION/ DROPS OPHTHALMIC at 09:00

## 2021-11-02 RX ADMIN — CLINDAMYCIN HYDROCHLORIDE 300 MG: 150 CAPSULE ORAL at 22:01

## 2021-11-02 RX ADMIN — ROPINIROLE HYDROCHLORIDE 1 MG: 1 TABLET, FILM COATED ORAL at 12:12

## 2021-11-02 RX ADMIN — DULOXETINE HYDROCHLORIDE 60 MG: 60 CAPSULE, DELAYED RELEASE ORAL at 10:27

## 2021-11-02 RX ADMIN — ROPINIROLE HYDROCHLORIDE 1 MG: 1 TABLET, FILM COATED ORAL at 14:30

## 2021-11-02 RX ADMIN — GEMFIBROZIL 600 MG: 600 TABLET ORAL at 18:02

## 2021-11-02 RX ADMIN — METOPROLOL SUCCINATE 25 MG: 25 TABLET, EXTENDED RELEASE ORAL at 10:27

## 2021-11-02 RX ADMIN — Medication 10 ML: at 14:37

## 2021-11-02 RX ADMIN — Medication 500 MG: at 10:27

## 2021-11-02 NOTE — PROGRESS NOTES
[facility] CARDIOLOGY PROGRESS NOTE           11/2/2021 12:50 PM    Admit Date: 10/31/2021      Subjective:     Improved dyspnea. Still on 2 L nasal cannula. Charted around 600 cc net negative but appears underestimated (>1L noted in cannula)    ROS:  Cardiovascular:  As noted above    Objective:      Vitals:    11/02/21 0400 11/02/21 0728 11/02/21 1130 11/02/21 1520   BP:  129/64 124/67 109/68   Pulse: 76 60 72 69   Resp:  20 18 19   Temp:  97.9 °F (36.6 °C) 98.3 °F (36.8 °C) 97.6 °F (36.4 °C)   SpO2:  97% 99% 95%   Weight: 128 lb 14.4 oz (58.5 kg)      Height:           Physical Exam:  General-No Acute Distress  Neck- supple, no JVD  CV- irregular rate and rhythm; apical/SB HSM  Lung- min basilar rales  Abd- soft, nontender, nondistended  Ext- tr edema bilaterally. Skin- warm and dry    Data Review:   Recent Labs     11/02/21  0611 11/01/21  0638    140   K 4.1 4.2   BUN 39* 32*   CREA 1.26* 1.25*   GLU 92 94   WBC 7.0 7.8   HGB 10.1* 11.2*   HCT 32.9* 35.3*    271       Assessment/Plan:     Principal Problem:    Acute hypoxemic respiratory failure ([address] (10/31/2021)  -Likely multifactorial.  Exam currently not grossly volume overloaded. Reviewed chest x-ray imaging independently. Continue IV Lasix for now. -Echocardiogram  images independently reviewed. Likely moderate MR at the worst; tricuspid regurgitation also likely moderate and eccentric. Would not contribute to current presentation.  -Wean O2 as tolerated    Active Problems:    Hyperlipemia (3/29/2009)  -continue statin therapy      Coronary atherosclerosis of native coronary artery--PTCA 1997 LAD/diag (7/1/2011)  -Prior CABG. Chest discomfort appears atypical with negative troponins. Defer any consideration for ischemic work-up at this time. Hypothyroidism (7/7/2011)      Atrial fibrillation ([address] (7/13/2011)  -Permanent. Adequately rate controlled. Continue anticoagulation.        Diastolic CH[facility], acute on chronic [facility] (12/25/2017)      Cellulitis of right leg (10/31/2021)      Chest pain (11/1/2021)      SOB (shortness of breath) (11/1/2021)      Helen Dietz MD  11/2/2021 12:50 PM

## 2021-11-02 NOTE — PROGRESS NOTES
ACUTE PHYSICAL THERAPY GOALS:  (Developed with and agreed upon by patient and/or caregiver. )  LTG:  (1.)Ms. Jumana Shah will move from supine to sit and sit to supine , scoot up and down and roll side to side in bed with INDEPENDENT within 7 treatment day(s). (2.)Ms. Jumana Shah will transfer from bed to chair and chair to bed with MODIFIED INDEPENDENCE using the least restrictive device within 7 treatment day(s). (3.)Ms. Jumana Shah will ambulate with SUPERVISION for 150' feet with the least restrictive device within 7 treatment day(s). PHYSICAL THERAPY: Daily Note Treatment Day # 2    Alysha Bella is a 80 y.o. female   PRIMARY DIAGNOSIS: Acute hypoxemic respiratory failure ([facility] [address]  Acute hypoxemic respiratory failure (HCC) [J96.01]  Acute exacerbation of CHF (congestive heart failure) ([facility] [address] [I50.9]  Cellulitis of right leg [B66.928]         ASSESSMENT:     REHAB RECOMMENDATIONS: CURRENT LEVEL OF FUNCTION:  (Most Recently Demonstrated)   Recommendation to date pending progress:  [address] [place] [address]  Equipment:    To Be Determined Bed Mobility:   Modified Independent  Sit to Stand:   Standby Assistance  Transfers:   Standby Assistance  Gait/Mobility:   Standby Assistance     ASSESSMENT:  Ms. Jumana Shah was in bed and very sleepy. Patient demonstrated increased gait distance today also activity tolerance. SpO2 remained >90% on room air during most of treatment, but after ambulation did drop to mid 80's. Replaced nasal cannula. Progressing towards all goals. .     SUBJECTIVE:   Ms. Jumana Shah states, \"thank you. Henry Bound \"    SOCIAL HISTORY/ LIVING ENVIRONMENT: lives in [facility], independent with rollator.   Support Systems: Child(miladys), Assisted Living  OBJECTIVE:     PAIN: VITAL SIGNS: LINES/DRAINS:   Pre Treatment: Pain Screen  Pain Scale 1: Numeric (0 - 10)  Pain Intensity 1: 0  Post Treatment: 0   Purewick  O2 Device: Nasal cannula     MOBILITY: I Mod I S SBA CGA Min Mod Max Total  NT x2 Comments:   Bed Mobility Rolling [] [x] [] [] [] [] [] [] [] [] []    Supine to Sit [] [x] [] [] [] [] [] [] [] [] []    Scooting [] [x] [] [] [] [] [] [] [] [] []    Sit to Supine [] [] [] [] [] [] [] [] [] [] []    Transfers    Sit to Stand [] [] [] [x] [] [] [] [] [] [] []    Bed to Chair [] [] [] [x] [] [] [] [] [] [] []    Stand to Sit [] [] [] [x] [] [] [] [] [] [] []    I=Independent, Mod I=Modified Independent, S=Supervision, SBA=Standby Assistance, CGA=Contact Guard Assistance,   Min=Minimal Assistance, Mod=Moderate Assistance, Max=Maximal Assistance, Total=Total Assistance, NT=Not Tested    BALANCE: Good Fair+ Fair Fair- Poor NT Comments   Sitting Static [x] [] [] [] [] []    Sitting Dynamic [x] [] [] [] [] []              Standing Static [] [] [x] [] [] []    Standing Dynamic [] [] [x] [] [] []      GAIT: I Mod I S SBA CGA Min Mod Max Total  NT x2 Comments:   Level of Assistance [] [] [] [x] [] [] [] [] [] [] []    Distance 150'    DME Rolling Walker and Gait Belt    Gait Quality Slow, short steps, flexed posture    Weightbearing  Status N/A     I=Independent, Mod I=Modified Independent, S=Supervision, SBA=Standby Assistance, CGA=Contact Guard Assistance,   Min=Minimal Assistance, Mod=Moderate Assistance, Max=Maximal Assistance, Total=Total Assistance, NT=Not Tested    PLAN:   FREQUENCY/DURATION: PT Plan of Care: 3 times/week for duration of hospital stay or until stated goals are met, whichever comes first.  TREATMENT:     TREATMENT:   ($$ Therapeutic Activity: 23-37 mins    )  Therapeutic Activity (30 Minutes): Therapeutic activity included Supine to Sit, Scooting, Transfer Training, Ambulation on level ground and Standing balance to improve functional Mobility, Strength and Activity tolerance.     TREATMENT GRID:  N/A    AFTER TREATMENT POSITION/PRECAUTIONS:  Chair, Needs within reach, RN notified and Visitors at bedside    INTERDISCIPLINARY COLLABORATION:  RN/PCT and PT/PTA    TOTAL TREATMENT DURATION:  PT Patient Time In/Time Out  Time In: 9668  Time Out: 7[address] [facility], PT, DPT

## 2021-11-02 NOTE — PROGRESS NOTES
Assumed [id number]. A/o, VSS, 2L NC. bed locked and low. Call bell, phone, and Tv remote within reach. Safety precautions in place. Hourly rounding completed on shift. All needs met. Will give report to oncoming nurse.

## 2021-11-02 NOTE — PROGRESS NOTES
Hospitalist Progress Note   Admit Date:  10/31/2021  9:27 AM   Name:  Abigail Chou   Age:  80 y.o. Sex:  female  :  1931   MRN:  [id number]   Room:  60/    Presenting Complaint: Chest Pain (Angina)    Reason(s) for Admission: Acute hypoxemic respiratory failure ([facility] [address] [J96.01]  Acute exacerbation of CHF (congestive heart failure) ([facility] [address] [I50.9]  Cellulitis of right leg [L03.115]     Hospital Course & Interval History:   Ms. Alvaro Fong a 79 y/o female with PMH of hypertension, coronary artery disease, atrial fibrillation (on Eliquis), diastolic CHF who was brought to the ER by EMS from [facility] because of chest heaviness.  She was given sublingual nitro x2 and ASA and her chest pain improved. She had substernal chest pain with no radiation prior to presenting. She became hypoxic in the ER w/ SpO2 80-85%; pO2 was 52.  CXR showed pulmonary edema. She was admitted to the Hospitalist service with acute hypoxemic respiratory failure. Cardiology was consulted. Subjective (21): Patient alert and oriented on rounding. Denies chest pain, SOB, ate breakfast well, no complaints. RLE with mild erythema, no swelling, no tenderness. Attempted to call daughter via phone for an update, left VM.     Assessment & Plan:     Acute hypoxemic respiratory failure (HCC)  -O2 sat on presentation 80 to 85% on RA with PO2 of 52 on ABG  -CXR w/ pulmonary edema and right pleural effusion  -Likely due to volume overload  -s/p Lasix 40 mg IV x 1 in ED, continue 40 mg IV daily  -Supplemental O2 currently 2L NC, wean as tolerated, oxygen saturations > 97%    Acute on Chronic Diastolic HF Exacerbation  Chest pain/CAD  -CXR w/ pulmonary edema and right pleural effusion  -pBNP 2615  -s/p Lasix 40 mg IV x 1 in ED, continue 40 mg IV daily  -Cardiology consulted  -Continue BB and statin  -2 gm Na and 1800 mL fluid restriction  -Daily weights, strict I&O's  -Troponins negative x 2, no indication for ischemic work-up  -Echo 94/8 with LV diastolic dysfunction, severe TV regurgitation with RVSP 58 mmHg/mod PAH  -Awaiting cardiology's evaluation of updated Echo    Right leg cellulitis, improved  -RLE erythema, warmth, tenderness on presentation   -US negative for DVT  -Continue Clindamycin PO with EOT 11/7  -RLE with mild erythema on rounding today, no swelling or tenderness     Macrocytic anemia  -B12 and folate wnl, Hgb 10.1, no overt bleeding noted     Atrial fibrillation, permanent  -Continue home metoprolol, apixaban     Hypothyroidism  -Continue levothyroxine     Dyslipidemia  -Continue statin     Depression  -Continue duloxetine    Dispo/Discharge Planning:  Pending clinical course, anticipate return to [facility] 1-2 days with [facility] therapies    Diet:  ADULT DIET Regular; Low Fat/Low Chol/High Fiber/2 gm Na; 1800 ml  DVT PPx: Eliquis  Code status: DNR    Hospital Problems as of 11/2/2021 Date Reviewed: 6/22/2021        Codes Class Noted - Resolved POA    Chest pain ICD-10-CM: R07.9  ICD-9-CM: 786.50  11/1/2021 - Present Unknown        SOB (shortness of breath) ICD-10-CM: R06.02  ICD-9-CM: 786.05  11/1/2021 - Present Unknown        Acute exacerbation of CHF (congestive heart failure) ([facility] [address] ICD-10-CM: I50.9  ICD-9-CM: 428.0  10/31/2021 - Present Unknown        Cellulitis of right leg ICD-10-CM: L03.115  ICD-9-CM: 682.6  10/31/2021 - Present Unknown        * (Principal) Acute hypoxemic respiratory failure (HCC) ICD-10-CM: J96.01  ICD-9-CM: 518.81  10/31/2021 - Present Unknown        Diastolic CHF, acute on chronic (HCC) ICD-10-CM: I50.33  ICD-9-CM: 428.33, 428.0  12/25/2017 - Present Yes        Atrial fibrillation (HCC) ICD-10-CM: I48.91  ICD-9-CM: 427.31  7/13/2011 - Present Yes        Hypothyroidism (Chronic) ICD-10-CM: E03.9  ICD-9-CM: 244.9  7/7/2011 - Present Yes        Coronary atherosclerosis of native coronary artery--PTCA 1997 LAD/diag (Chronic) ICD-10-CM: I25.10  ICD-9-CM: 414.01  7/1/2011 - Present Yes Hyperlipemia (Chronic) ICD-10-CM: E78.5  ICD-9-CM: 272.4  3/29/2009 - Present Yes              Objective:     Patient Vitals for the past 24 hrs:   Temp Pulse Resp BP SpO2   11/02/21 1130 98.3 °F (36.8 °C) 72 18 124/67 99 %   11/02/21 0728 97.9 °F (36.6 °C) 60 20 129/64 97 %   11/02/21 0400  76      11/02/21 0301 98.5 °F (36.9 °C) 80 16 108/62 95 %   11/02/21 0025 98.3 °F (36.8 °C) 77 16 101/60 94 %   11/01/21 2348  73      11/01/21 2006 98.2 °F (36.8 °C) 76 16 112/63 96 %   11/01/21 2000  77      11/01/21 1637    (!) 152/78    11/01/21 1548 97.5 °F (36.4 °C) 70 16 117/73 94 %     Oxygen Therapy  O2 Sat (%): 99 % (11/02/21 1130)  Pulse via Oximetry: 78 beats per minute (11/01/21 0407)  O2 Device: Nasal cannula (11/02/21 0728)  Skin Assessment: Clean, dry, & intact (10/31/21 1945)  O2 Flow Rate (L/min): 2 l/min (11/01/21 0407)    Estimated body mass index is 25.17 kg/m² as calculated from the following:    Height as of this encounter: 5' (1.524 m). Weight as of this encounter: 58.5 kg (128 lb 14.4 oz). Intake/Output Summary (Last 24 hours) at 11/2/2021 1452  Last data filed at 11/2/2021 0648  Gross per 24 hour   Intake    Output 350 ml   Net -350 ml         Physical Exam:   Blood pressure 124/67, pulse 72, temperature 98.3 °F (36.8 °C), resp. rate 18, height 5' (1.524 m), weight 58.5 kg (128 lb 14.4 oz), SpO2 99 %. General:    Well nourished. No overt distress  Head:  Normocephalic, atraumatic  Eyes:  Sclerae appear normal.  Pupils equally round. Glasses on. ENT:  Nares appear normal, no drainage. Moist oral mucosa  Neck:  No restricted ROM. Trachea midline   CV:   Irregular rhythm. + murmur no JVD  Lungs: No wheezing, no rhonchi, Clear to auscultation. Abdomen: Bowel sounds present. Soft, nontender, nondistended. Extremities: No cyanosis or clubbing. No edema. RLE with mild erythema around ankle, no tenderness, no swelling. Skin:     No rashes and normal coloration.    Warm and dry. Neuro:  CN II-XII grossly intact. Sensation intact. A&Ox3  Psych:  Normal mood and affect. I have reviewed ordered lab tests and independently visualized imaging below:    Recent Labs:  Recent Results (from the past 48 hour(s))   METABOLIC PANEL, COMPREHENSIVE    Collection Time: 11/01/21  6:38 AM   Result Value Ref Range    Sodium 140 136 - 145 mmol/L    Potassium 4.2 3.5 - 5.1 mmol/L    Chloride 105 98 - 107 mmol/L    CO2 28 21 - 32 mmol/L    Anion gap 7 7 - 16 mmol/L    Glucose 94 65 - 100 mg/dL    BUN 32 (H) 8 - 23 MG/DL    Creatinine 1.25 (H) 0.6 - 1.0 MG/DL    GFR est AA 52 (L) >60 ml/min/1.73m2    GFR est non-AA 43 (L) >60 ml/min/1.73m2    Calcium 10.2 8.3 - 10.4 MG/DL    Bilirubin, total 0.4 0.2 - 1.1 MG/DL    ALT (SGPT) 14 12 - 65 U/L    AST (SGOT) 17 15 - 37 U/L    Alk. phosphatase 151 (H) 50 - 136 U/L    Protein, total 7.5 6.3 - 8.2 g/dL    Albumin 3.2 3.2 - 4.6 g/dL    Globulin 4.3 (H) 2.3 - 3.5 g/dL    A-G Ratio 0.7 (L) 1.2 - 3.5     CBC WITH AUTOMATED DIFF    Collection Time: 11/01/21  6:38 AM   Result Value Ref Range    WBC 7.8 4.3 - 11.1 K/uL    RBC 3.40 (L) 4.05 - 5.2 M/uL    HGB 11.2 (L) 11.7 - 15.4 g/dL    HCT 35.3 (L) 35.8 - 46.3 %    .8 (H) 79.6 - 97.8 FL    MCH 32.9 26.1 - 32.9 PG    MCHC 31.7 31.4 - 35.0 g/dL    RDW 14.6 11.9 - 14.6 %    PLATELET 716 087 - 743 K/uL    MPV 10.2 9.4 - 12.3 FL    ABSOLUTE NRBC 0.00 0.0 - 0.2 K/uL    DF AUTOMATED      NEUTROPHILS 59 43 - 78 %    LYMPHOCYTES 22 13 - 44 %    MONOCYTES 14 (H) 4.0 - 12.0 %    EOSINOPHILS 3 0.5 - 7.8 %    BASOPHILS 1 0.0 - 2.0 %    IMMATURE GRANULOCYTES 1 0.0 - 5.0 %    ABS. NEUTROPHILS 4.6 1.7 - 8.2 K/UL    ABS. LYMPHOCYTES 1.8 0.5 - 4.6 K/UL    ABS. MONOCYTES 1.1 0.1 - 1.3 K/UL    ABS. EOSINOPHILS 0.2 0.0 - 0.8 K/UL    ABS. BASOPHILS 0.1 0.0 - 0.2 K/UL    ABS. IMM.  GRANS. 0.1 0.0 - 0.5 K/UL   ECHO ADULT COMPLETE    Collection Time: 11/01/21  2:30 PM   Result Value Ref Range    Left Atrium Minor Axis 4.19 cm Left Atrium Major Axis 6.50 cm    LA Area 2C 22.60 cm2    LA Area 4C 24.40 cm2    LV ED Vol A4C 28.40 cm3    LV ES Vol A4C 8.35 cm3    IVSd 0.99 (A) 0.60 - 0.90 cm    LVIDd 3.52 (A) 3.90 - 5.30 cm    LVIDs 2.25 cm    LVOT d 1.70 cm    LVOT VTI 20.10 cm    LVOT Peak Gradient 4.00 mmHg    LVPWd 0.90 0.60 - 0.90 cm    LV E' Lateral Velocity 12.20 cm/s    LV E' Septal Velocity 8.70 cm/s    AV Cusp 1.70 cm    Aortic Valve Systolic Mean Gradient 2.80 mmHg    AoV VTI 32.60 cm    Aortic Valve Systolic Peak Velocity 261.96 cm/s    AoV PG 13.00 mmHg    Aortic Valve Area by Continuity of VTI 1.40 cm2    Aortic Valve Area by Continuity of Peak Velocity 1.32 cm2    AO ASC D 3.10 cm    Mitral Valve Max Velocity 142.00 cm/s    MV Peak Gradient 8.00 mmHg    Mitral Valve E Wave Deceleration Time 165.00 ms    MV A Ty 30.00 cm/s    MV E Ty 129.00 cm/s    MV Mean Gradient 2.00 mmHg    Mitral Valve Annulus Velocity Time Integral 31.00 cm    Pulmonic Regurgitant End Max Velocity 115.00 cm/s    Right Atrial Area 4C 22.40 cm2    RVIDd 1.91 cm    Tapse 1.87 1.50 - 2.00 cm    TR Max Velocity 354.00 cm/s    Triscuspid Valve Regurgitation Peak Gradient 50.00 mmHg    MV E/A 4.30     LV Mass AL 96.1 67.0 - 162.0 g    LV Mass AL Index 62.0 43.0 - 95.0 g/m2    E/E' lateral 10.57     E/E' septal 14.83     RVSP 58.0 mmHg    E/E' ratio (averaged) 12.70     Est. RA Pressure 8.0 mmHg    CASSIDY/BSA Pk Ty 0.9 cm2/m2    CASSIDY/BSA VTI 0.9 cm2/m2   CBC W/O DIFF    Collection Time: 11/02/21  6:11 AM   Result Value Ref Range    WBC 7.0 4.3 - 11.1 K/uL    RBC 3.22 (L) 4.05 - 5.2 M/uL    HGB 10.1 (L) 11.7 - 15.4 g/dL    HCT 32.9 (L) 35.8 - 46.3 %    .2 (H) 79.6 - 97.8 FL    MCH 31.4 26.1 - 32.9 PG    MCHC 30.7 (L) 31.4 - 35.0 g/dL    RDW 14.5 11.9 - 14.6 %    PLATELET 945 017 - 012 K/uL    MPV 9.8 9.4 - 12.3 FL    ABSOLUTE NRBC 0.00 0.0 - 0.2 K/uL   METABOLIC PANEL, BASIC    Collection Time: 11/02/21  6:11 AM   Result Value Ref Range    Sodium 138 136 - 145 mmol/L    Potassium 4.1 3.5 - 5.1 mmol/L    Chloride 106 98 - 107 mmol/L    CO2 27 21 - 32 mmol/L    Anion gap 5 (L) 7 - 16 mmol/L    Glucose 92 65 - 100 mg/dL    BUN 39 (H) 8 - 23 MG/DL    Creatinine 1.26 (H) 0.6 - 1.0 MG/DL    GFR est AA 51 (L) >60 ml/min/1.73m2    GFR est non-AA 42 (L) >60 ml/min/1.73m2    Calcium 10.1 8.3 - 10.4 MG/DL   VITAMIN B12    Collection Time: 11/02/21  6:11 AM   Result Value Ref Range    Vitamin B12 517 193 - 986 pg/mL   FOLATE    Collection Time: 11/02/21  6:11 AM   Result Value Ref Range    Folate 11.8 3.1 - 17.5 ng/mL       All Micro Results     Procedure Component Value Units Date/Time    COVID-19 RAPID TEST [[id number]] Collected: 10/31/21 1103    Order Status: Completed Specimen: Nasopharyngeal Updated: 10/31/21 1126     Specimen source NASAL        Comment: RAPID ONLY        COVID-19 rapid test Not detected        Comment:      The specimen is NEGATIVE for SARS-CoV-2, the novel coronavirus associated with COVID-19. A negative result does not rule out COVID-19. This test has been authorized by the FDA under an Emergency Use Authorization (EUA) for use by authorized laboratories. Fact sheet for Healthcare Providers: ConventionUpdate.co.nz  Fact sheet for Patients: ConventionUpdate.co.nz       Methodology: Isothermal Nucleic Acid Amplification               Other Studies:  No results found.     Current Meds:  Current Facility-Administered Medications   Medication Dose Route Frequency    apixaban (ELIQUIS) tablet 2.5 mg  2.5 mg Oral BID    ascorbic acid (vitamin C) (VITAMIN C) tablet 500 mg  500 mg Oral DAILY    buprenorphine (BUTRANS) 10 mcg/hour patch 1 Patch (Patient Supplied)  1 Patch TransDERmal Q7D    DULoxetine (CYMBALTA) capsule 60 mg  60 mg Oral DAILY    gabapentin (NEURONTIN) capsule 300 mg  300 mg Oral BID    gemfibroziL (LOPID) tablet 600 mg  600 mg Oral BID    latanoprost (XALATAN) 0.005 % ophthalmic solution 1 Drop  1 Drop Both Eyes QHS    levothyroxine (SYNTHROID) tablet 112 mcg  112 mcg Oral ACB    metoprolol succinate (TOPROL-XL) XL tablet 25 mg  25 mg Oral DAILY    nitroglycerin (NITROSTAT) tablet 0.4 mg  0.4 mg SubLINGual Q5MIN PRN    potassium chloride (K-DUR, KLOR-CON) SR tablet 20 mEq  20 mEq Oral BID    rOPINIRole (REQUIP) tablet 1 mg  1 mg Oral TID    rosuvastatin (CRESTOR) tablet 40 mg  40 mg Oral QHS    timolol (TIMOPTIC) 0.5 % ophthalmic solution 1 Drop  1 Drop Both Eyes DAILY    clindamycin (CLEOCIN) capsule 300 mg  300 mg Oral Q8H    sodium chloride (NS) flush 5-40 mL  5-40 mL IntraVENous Q8H    sodium chloride (NS) flush 5-40 mL  5-40 mL IntraVENous PRN    acetaminophen (TYLENOL) tablet 650 mg  650 mg Oral Q6H PRN    Or    acetaminophen (TYLENOL) suppository 650 mg  650 mg Rectal Q6H PRN    polyethylene glycol (MIRALAX) packet 17 g  17 g Oral DAILY PRN    ondansetron (ZOFRAN ODT) tablet 4 mg  4 mg Oral Q8H PRN    Or    ondansetron (ZOFRAN) injection 4 mg  4 mg IntraVENous Q6H PRN    magnesium sulfate 2 g/50 ml IVPB (premix or compounded)  2 g IntraVENous PRN    Saccharomyces boulardii (FLORASTOR) capsule 500 mg  500 mg Oral BID    furosemide (LASIX) injection 40 mg  40 mg IntraVENous DAILY     Labs, vital signs, diagnostic testing reviewed. Case discussed with patient, care team, Dr. Pushpa Latham. Signed:  Suleman Rayo NP    Part of this note may have been written by using a voice dictation software. The note has been proof read but may still contain some grammatical/other typographical errors.

## 2021-11-03 ENCOUNTER — APPOINTMENT (OUTPATIENT)
Dept: GENERAL RADIOLOGY | Age: 86
DRG: 291 | End: 2021-11-03
Attending: STUDENT IN AN ORGANIZED HEALTH CARE EDUCATION/TRAINING PROGRAM
Payer: MEDICARE

## 2021-11-03 LAB
ANION GAP SERPL CALC-SCNC: 5 MMOL/L (ref 7–16)
BUN SERPL-MCNC: 38 MG/DL (ref 8–23)
CALCIUM SERPL-MCNC: 10 MG/DL (ref 8.3–10.4)
CHLORIDE SERPL-SCNC: 105 MMOL/L (ref 98–107)
CO2 SERPL-SCNC: 26 MMOL/L (ref 21–32)
CREAT SERPL-MCNC: 1.26 MG/DL (ref 0.6–1)
ERYTHROCYTE [DISTWIDTH] IN BLOOD BY AUTOMATED COUNT: 14.6 % (ref 11.9–14.6)
GLUCOSE SERPL-MCNC: 93 MG/DL (ref 65–100)
HCT VFR BLD AUTO: 37 % (ref 35.8–46.3)
HGB BLD-MCNC: 11.6 G/DL (ref 11.7–15.4)
MCH RBC QN AUTO: 32.9 PG (ref 26.1–32.9)
MCHC RBC AUTO-ENTMCNC: 31.4 G/DL (ref 31.4–35)
MCV RBC AUTO: 104.8 FL (ref 79.6–97.8)
NRBC # BLD: 0 K/UL (ref 0–0.2)
PLATELET # BLD AUTO: 283 K/UL (ref 150–450)
PMV BLD AUTO: 10 FL (ref 9.4–12.3)
POTASSIUM SERPL-SCNC: 4.6 MMOL/L (ref 3.5–5.1)
PROCALCITONIN SERPL-MCNC: <0.05 NG/ML (ref 0–0.49)
RBC # BLD AUTO: 3.53 M/UL (ref 4.05–5.2)
SODIUM SERPL-SCNC: 136 MMOL/L (ref 136–145)
WBC # BLD AUTO: 10.4 K/UL (ref 4.3–11.1)

## 2021-11-03 PROCEDURE — 80048 BASIC METABOLIC PNL TOTAL CA: CPT

## 2021-11-03 PROCEDURE — 71046 X-RAY EXAM CHEST 2 VIEWS: CPT

## 2021-11-03 PROCEDURE — 74011250636 HC RX REV CODE- 250/636: Performed by: HOSPITALIST

## 2021-11-03 PROCEDURE — 85027 COMPLETE CBC AUTOMATED: CPT

## 2021-11-03 PROCEDURE — 84145 PROCALCITONIN (PCT): CPT

## 2021-11-03 PROCEDURE — 86580 TB INTRADERMAL TEST: CPT | Performed by: STUDENT IN AN ORGANIZED HEALTH CARE EDUCATION/TRAINING PROGRAM

## 2021-11-03 PROCEDURE — 36415 COLL VENOUS BLD VENIPUNCTURE: CPT

## 2021-11-03 PROCEDURE — 97530 THERAPEUTIC ACTIVITIES: CPT

## 2021-11-03 PROCEDURE — 71045 X-RAY EXAM CHEST 1 VIEW: CPT

## 2021-11-03 PROCEDURE — 74011250637 HC RX REV CODE- 250/637: Performed by: STUDENT IN AN ORGANIZED HEALTH CARE EDUCATION/TRAINING PROGRAM

## 2021-11-03 PROCEDURE — 97535 SELF CARE MNGMENT TRAINING: CPT

## 2021-11-03 PROCEDURE — 99232 SBSQ HOSP IP/OBS MODERATE 35: CPT | Performed by: INTERNAL MEDICINE

## 2021-11-03 PROCEDURE — 65660000000 HC RM CCU STEPDOWN

## 2021-11-03 PROCEDURE — 74011000250 HC RX REV CODE- 250: Performed by: STUDENT IN AN ORGANIZED HEALTH CARE EDUCATION/TRAINING PROGRAM

## 2021-11-03 PROCEDURE — 74011250637 HC RX REV CODE- 250/637: Performed by: HOSPITALIST

## 2021-11-03 PROCEDURE — 77030040393 HC DRSG OPTIFOAM GENT MDII -B

## 2021-11-03 RX ADMIN — ROPINIROLE HYDROCHLORIDE 1 MG: 1 TABLET, FILM COATED ORAL at 12:34

## 2021-11-03 RX ADMIN — GABAPENTIN 300 MG: 300 CAPSULE ORAL at 09:51

## 2021-11-03 RX ADMIN — CLINDAMYCIN HYDROCHLORIDE 300 MG: 150 CAPSULE ORAL at 05:12

## 2021-11-03 RX ADMIN — TIMOLOL MALEATE 1 DROP: 5 SOLUTION/ DROPS OPHTHALMIC at 09:51

## 2021-11-03 RX ADMIN — GEMFIBROZIL 600 MG: 600 TABLET ORAL at 09:51

## 2021-11-03 RX ADMIN — Medication 500 MG: at 17:32

## 2021-11-03 RX ADMIN — DULOXETINE HYDROCHLORIDE 60 MG: 60 CAPSULE, DELAYED RELEASE ORAL at 09:51

## 2021-11-03 RX ADMIN — CLINDAMYCIN HYDROCHLORIDE 300 MG: 150 CAPSULE ORAL at 21:59

## 2021-11-03 RX ADMIN — POTASSIUM CHLORIDE 20 MEQ: 20 TABLET, EXTENDED RELEASE ORAL at 17:32

## 2021-11-03 RX ADMIN — TUBERCULIN PURIFIED PROTEIN DERIVATIVE 5 UNITS: 5 INJECTION, SOLUTION INTRADERMAL at 15:54

## 2021-11-03 RX ADMIN — ROPINIROLE HYDROCHLORIDE 1 MG: 1 TABLET, FILM COATED ORAL at 15:54

## 2021-11-03 RX ADMIN — Medication 500 MG: at 09:51

## 2021-11-03 RX ADMIN — GABAPENTIN 300 MG: 300 CAPSULE ORAL at 22:00

## 2021-11-03 RX ADMIN — Medication 10 ML: at 22:00

## 2021-11-03 RX ADMIN — Medication 10 ML: at 15:53

## 2021-11-03 RX ADMIN — LEVOTHYROXINE SODIUM 112 MCG: 0.11 TABLET ORAL at 05:12

## 2021-11-03 RX ADMIN — POTASSIUM CHLORIDE 20 MEQ: 20 TABLET, EXTENDED RELEASE ORAL at 09:51

## 2021-11-03 RX ADMIN — FUROSEMIDE 40 MG: 10 INJECTION, SOLUTION INTRAMUSCULAR; INTRAVENOUS at 09:51

## 2021-11-03 RX ADMIN — Medication 10 ML: at 05:12

## 2021-11-03 RX ADMIN — ROSUVASTATIN CALCIUM 40 MG: 20 TABLET, COATED ORAL at 21:59

## 2021-11-03 RX ADMIN — LATANOPROST 1 DROP: 50 SOLUTION OPHTHALMIC at 22:07

## 2021-11-03 RX ADMIN — OXYCODONE HYDROCHLORIDE AND ACETAMINOPHEN 500 MG: 500 TABLET ORAL at 09:51

## 2021-11-03 RX ADMIN — APIXABAN 2.5 MG: 2.5 TABLET, FILM COATED ORAL at 09:51

## 2021-11-03 RX ADMIN — CLINDAMYCIN HYDROCHLORIDE 300 MG: 150 CAPSULE ORAL at 15:54

## 2021-11-03 RX ADMIN — ROPINIROLE HYDROCHLORIDE 1 MG: 1 TABLET, FILM COATED ORAL at 21:59

## 2021-11-03 RX ADMIN — METOPROLOL SUCCINATE 25 MG: 25 TABLET, EXTENDED RELEASE ORAL at 09:51

## 2021-11-03 RX ADMIN — GEMFIBROZIL 600 MG: 600 TABLET ORAL at 17:32

## 2021-11-03 RX ADMIN — APIXABAN 2.5 MG: 2.5 TABLET, FILM COATED ORAL at 17:32

## 2021-11-03 NOTE — PROGRESS NOTES
CM chart review; also discussed in IDR. Ambulatory oxygen sat has been ordered. CM placed call to [facility]; spoke with Kailey Love. Patient would not be able to return to [facility] with oxygen; she would need to be discharged to rehab before returning. Attending updated. CM will follow for results and will assist with discharge planning.

## 2021-11-03 NOTE — PROGRESS NOTES
ACUTE PHYSICAL THERAPY GOALS:  (Developed with and agreed upon by patient and/or caregiver. )  LTG:  (1.)Ms. Anita Guerrero will move from supine to sit and sit to supine , scoot up and down and roll side to side in bed with INDEPENDENT within 7 treatment day(s). (2.)Ms. Anita Guerrero will transfer from bed to chair and chair to bed with MODIFIED INDEPENDENCE using the least restrictive device within 7 treatment day(s). (3.)Ms. Anita Guerrero will ambulate with SUPERVISION for 150' feet with the least restrictive device within 7 treatment day(s). PHYSICAL THERAPY: Daily Note and AM Treatment Day # 3    Dayne Tuttle is a 80 y.o. female   PRIMARY DIAGNOSIS: Acute hypoxemic respiratory failure (HCC)  Acute hypoxemic respiratory failure (HCC) [J96.01]  Acute exacerbation of CHF (congestive heart failure) (HCC) [I50.9]  Cellulitis of right leg [S91.920]         ASSESSMENT:     REHAB RECOMMENDATIONS: CURRENT LEVEL OF FUNCTION:  (Most Recently Demonstrated)   Recommendation to date pending progress:  [address] [place] [address]  Equipment:    To Be Determined Bed Mobility:   Not tested  Sit to Stand:  [employer] Assistance  Transfers:  [address]:   Contact Guard Assistance     ASSESSMENT:  Ms. Anita Guerrero continues to make good progress towards PT goals as noted by increased activity tolerance and overall mobility. Patient needed 2L O2 to maintain saturations over 90% during ambulation in hallway. Patient demonstrates fair+ standing balance during standing ADL activity. Will continue efforts. SUBJECTIVE:   Ms. Anita Guerrero states, \"[facility] live at the women's home\"    SOCIAL HISTORY/ LIVING ENVIRONMENT: lives in [facility], independent with rollator.   Support Systems: Child(miladys), Assisted Living  OBJECTIVE:     PAIN: VITAL SIGNS: LINES/DRAINS:   Pre Treatment: Pain Screen  Pain Scale 1: FLACC  Pain Intensity 1: 0  Post Treatment: 0   Purewick  O2 Device: Nasal cannula     MOBILITY: I Mod I S SBA CGA Min Mod Max Total  NT x2 Comments:   Bed Mobility    Rolling [] [] [] [] [] [] [] [] [] [] []    Supine to Sit [] [] [] [] [] [] [] [] [] [] []    Scooting [] [] [] [] [] [] [] [] [] [] []    Sit to Supine [] [] [] [] [] [] [] [] [] [] []    Transfers    Sit to Stand [] [] [] [] [x] [] [] [] [] [] []    Bed to Chair [] [] [] [] [x] [] [] [] [] [] []    Stand to Sit [] [] [] [] [x] [] [] [] [] [] []    I=Independent, Mod I=Modified Independent, S=Supervision, SBA=Standby Assistance, CGA=Contact Guard Assistance,   Min=Minimal Assistance, Mod=Moderate Assistance, Max=Maximal Assistance, Total=Total Assistance, NT=Not Tested    BALANCE: Good Fair+ Fair Fair- Poor NT Comments   Sitting Static [x] [] [] [] [] []    Sitting Dynamic [x] [] [] [] [] []              Standing Static [] [x] [] [] [] []    Standing Dynamic [] [] [x] [] [] []      GAIT: I Mod I S SBA CGA Min Mod Max Total  NT x2 Comments:   Level of Assistance [] [] [] [] [x] [] [] [] [] [] []    Distance 150'    DME Rolling Walker and Gait Belt    Gait Quality Slow, short steps, flexed posture    Weightbearing  Status N/A     I=Independent, Mod I=Modified Independent, S=Supervision, SBA=Standby Assistance, CGA=Contact Guard Assistance,   Min=Minimal Assistance, Mod=Moderate Assistance, Max=Maximal Assistance, Total=Total Assistance, NT=Not Tested    PLAN:   FREQUENCY/DURATION: PT Plan of Care: 3 times/week for duration of hospital stay or until stated goals are met, whichever comes first.  TREATMENT:     TREATMENT:   ($$ Therapeutic Activity: 23-37 mins    )  Therapeutic Activity (25 Minutes): Therapeutic activity included Supine to Sit, Scooting, Transfer Training, Ambulation on level ground and Standing balance to improve functional Mobility, Strength and Activity tolerance.     TREATMENT GRID:  N/A    AFTER TREATMENT POSITION/PRECAUTIONS:  Chair, Needs within reach and RN notified    INTERDISCIPLINARY COLLABORATION:  RN/PCT and PT/PTA    TOTAL TREATMENT DURATION:  PT Patient Time In/Time Out  Time In: 1025  Time Out: [id number]  27 Margaret, PTA

## 2021-11-03 NOTE — PROGRESS NOTES
Hospitalist Progress Note   Admit Date:  10/31/2021  9:27 AM   Name:  Claudetta Banker   Age:  80 y.o. Sex:  female  :  1931   MRN:  [id number]   Room:  607/01    Presenting Complaint: Chest Pain (Angina)    Reason(s) for Admission: Acute hypoxemic respiratory failure ([facility] [J96.01]  Acute exacerbation of CHF (congestive heart failure) ([facility] [I50.9]  Cellulitis of right leg [L03.115]     Hospital Course & Interval History:   Ms. Cheryn Gosselin a 79 y/o female with PMH of hypertension, CAD, atrial fibrillation (on Eliquis), diastolic CHF who was brought to the ER by EMS from [facility] because of chest heaviness.  She was given sublingual nitro x2 and ASA and her chest pain improved. She had substernal chest pain with no radiation prior to presenting. She became hypoxic in the ER w/ SpO2 80-85%; pO2 was 52.  CXR showed pulmonary edema. She was admitted to the Hospitalist service with acute hypoxemic respiratory failure and acute on chronic diastolic HF Exacerbation. Cardiology was consulted. Subjective (21): Patient A&O x 3, in chair eating lunch, denies SOB, chest pain, palpitations, BLE swelling. Daughter at bedside, updated on plan of care.     Assessment & Plan:     Acute hypoxemic respiratory failure (HCC), improving  -O2 sat on presentation 80 to 85% on RA with PO2 of 52 on ABG  -CXR w/pulmonary edema and right pleural effusion  -Likely due to volume overload  -s/p Lasix 40 mg IV x 1 in ED, continue 40 mg IV daily  -Walk test 11/3, RA at rest but requires 2L NC with exertion  -Unable to return to [facility] directly with oxygen and family wants to avoid rehab stay, continue diuresis, consider re-do walk test  to evaluate ambulation needs    Acute on Chronic Diastolic HF Exacerbation  Chest pain/CAD  -CXR w/pulmonary edema and right pleural effusion  -pBNP 2615  -s/p Lasix 40 mg IV x 1 in ED, continue 40 mg IV daily  -Cardiology following  -Continue BB and statin  -2 gm Na and 1800 mL fluid restriction  -Daily weights, strict I&O's  -Troponins negative x 2, no indication for ischemic work-up  -Echo 40/2 with LV diastolic dysfunction, mod MR and TR, severely dilated atria, RVSP 58 mmHg/mod [facility]  -Cardiology with plans to place on Bumex 1mg PO daily 11/4 and at discharge    Right leg cellulitis, improved  -RLE erythema, warmth, tenderness on presentation   -US negative for DVT  -Continue Clindamycin PO with EOT 11/7  -RLE with mild erythema on rounding today, no swelling or tenderness     Macrocytic anemia, stable  -B12 and folate wnl, Hgb 11.6     Atrial fibrillation, permanent  -Continue home metoprolol, apixaban     Hypothyroidism  -Continue levothyroxine     Dyslipidemia  -Continue statin     Depression  -Continue duloxetine    Dispo/Discharge Planning:  Pending re-do walk test Friday, possible d/c back to senior living    Diet:  ADULT DIET Regular; Low Fat/Low Chol/High Fiber/2 gm Na; 1800 ml  DVT PPx: Eliquis  Code status: DNR    Hospital Problems as of 11/3/2021 Date Reviewed: 6/22/2021          Codes Class Noted - Resolved POA    Chest pain ICD-10-CM: R07.9  ICD-9-CM: 786.50  11/1/2021 - Present Unknown        SOB (shortness of breath) ICD-10-CM: R06.02  ICD-9-CM: 786.05  11/1/2021 - Present Unknown        Acute exacerbation of CHF (congestive heart failure) ([facility] ICD-10-CM: I50.9  ICD-9-CM: 428.0  10/31/2021 - Present         Cellulitis of right leg ICD-10-CM: L03.115  ICD-9-CM: 682.6  10/31/2021 - Present Unknown        * (Principal) Acute hypoxemic respiratory failure (HCC) ICD-10-CM: J96.01  ICD-9-CM: 518.81  10/31/2021 - Present Unknown        Diastolic CHF, acute on chronic (HCC) ICD-10-CM: I50.33  ICD-9-CM: 428.33, 428.0  12/25/2017 - Present Yes        Atrial fibrillation (HCC) ICD-10-CM: I48.91  ICD-9-CM: 427.31  7/13/2011 - Present Yes        Hypothyroidism (Chronic) ICD-10-CM: E03.9  ICD-9-CM: 244.9  7/7/2011 - Present Yes        Coronary atherosclerosis of native coronary artery--PTCA 1997 LAD/diag (Chronic) ICD-10-CM: I25.10  ICD-9-CM: 414.01  7/1/2011 - Present Yes        Mitral valve insufficiency (Chronic) ICD-10-CM: I34.0  ICD-9-CM: 424.0  4/2/2009 - Present Yes        Hyperlipemia (Chronic) ICD-10-CM: E78.5  ICD-9-CM: 272.4  3/29/2009 - Present Yes              Objective:     Patient Vitals for the past 24 hrs:   Temp Pulse Resp BP SpO2   11/03/21 1557 97.6 °F (36.4 °C) 67 16 132/62 93 %   11/03/21 1130 97.6 °F (36.4 °C) 69 16 132/64 92 %   11/03/21 0802 98.1 °F (36.7 °C) 66 16 (!) 133/59 95 %   11/03/21 0800 98.1 °F (36.7 °C) 67 16 (!) 133/59 95 %   11/03/21 0428 97.6 °F (36.4 °C) 68 18 134/73 95 %   11/02/21 2359 97.5 °F (36.4 °C) 63 18 124/86 95 %   11/02/21 2314 97.5 °F (36.4 °C) 63 16 124/86 95 %   11/02/21 2013 97.4 °F (36.3 °C) 65 20 102/63 94 %     Oxygen Therapy  O2 Sat (%): 93 % (11/03/21 1557)  Pulse via Oximetry: 78 beats per minute (11/01/21 0407)  O2 Device: Nasal cannula (11/02/21 1405)  Skin Assessment: Clean, dry, & intact (10/31/21 1945)  O2 Flow Rate (L/min): 1 l/min (11/02/21 1405)    Estimated body mass index is 24.94 kg/m² as calculated from the following:    Height as of this encounter: 5' (1.524 m). Weight as of this encounter: 57.9 kg (127 lb 11.2 oz). No intake or output data in the 24 hours ending 11/03/21 1611      Physical Exam:   Blood pressure 132/62, pulse 67, temperature 97.6 °F (36.4 °C), resp. rate 16, height 5' (1.524 m), weight 57.9 kg (127 lb 11.2 oz), SpO2 93 %. General:    Well nourished. No overt distress. Calm. Conversational.  Head:  Normocephalic, atraumatic  Eyes:  Sclerae appear normal.  Pupils equally round. Glasses on. ENT:  Nares appear normal, no drainage. Moist oral mucosa  Neck:  No restricted ROM. Trachea midline   CV:   Irregular rhythm. + murmur no JVD  Lungs: No wheezing, no rhonchi, Scattered crackles bibasilar  Abdomen: Bowel sounds present. Soft, nontender, nondistended.   Extremities: No cyanosis or clubbing. No edema. RLE with mild erythema around ankle, no tenderness, no swelling. Skin:     No rashes and normal coloration. Warm and dry. Neuro:  CN II-XII grossly intact. Sensation intact. A&Ox3  Psych:  Normal mood and affect.       I have reviewed ordered lab tests and independently visualized imaging below:    Recent Labs:  Recent Results (from the past 48 hour(s))   CBC W/O DIFF    Collection Time: 11/02/21  6:11 AM   Result Value Ref Range    WBC 7.0 4.3 - 11.1 K/uL    RBC 3.22 (L) 4.05 - 5.2 M/uL    HGB 10.1 (L) 11.7 - 15.4 g/dL    HCT 32.9 (L) 35.8 - 46.3 %    .2 (H) 79.6 - 97.8 FL    MCH 31.4 26.1 - 32.9 PG    MCHC 30.7 (L) 31.4 - 35.0 g/dL    RDW 14.5 11.9 - 14.6 %    PLATELET 946 022 - 619 K/uL    MPV 9.8 9.4 - 12.3 FL    ABSOLUTE NRBC 0.00 0.0 - 0.2 K/uL   METABOLIC PANEL, BASIC    Collection Time: 11/02/21  6:11 AM   Result Value Ref Range    Sodium 138 136 - 145 mmol/L    Potassium 4.1 3.5 - 5.1 mmol/L    Chloride 106 98 - 107 mmol/L    CO2 27 21 - 32 mmol/L    Anion gap 5 (L) 7 - 16 mmol/L    Glucose 92 65 - 100 mg/dL    BUN 39 (H) 8 - 23 MG/DL    Creatinine 1.26 (H) 0.6 - 1.0 MG/DL    GFR est AA 51 (L) >60 ml/min/1.73m2    GFR est non-AA 42 (L) >60 ml/min/1.73m2    Calcium 10.1 8.3 - 10.4 MG/DL   VITAMIN B12    Collection Time: 11/02/21  6:11 AM   Result Value Ref Range    Vitamin B12 517 193 - 986 pg/mL   FOLATE    Collection Time: 11/02/21  6:11 AM   Result Value Ref Range    Folate 11.8 3.1 - 88.0 ng/mL   METABOLIC PANEL, BASIC    Collection Time: 11/03/21  5:58 AM   Result Value Ref Range    Sodium 136 136 - 145 mmol/L    Potassium 4.6 3.5 - 5.1 mmol/L    Chloride 105 98 - 107 mmol/L    CO2 26 21 - 32 mmol/L    Anion gap 5 (L) 7 - 16 mmol/L    Glucose 93 65 - 100 mg/dL    BUN 38 (H) 8 - 23 MG/DL    Creatinine 1.26 (H) 0.6 - 1.0 MG/DL    GFR est AA 51 (L) >60 ml/min/1.73m2    GFR est non-AA 42 (L) >60 ml/min/1.73m2    Calcium 10.0 8.3 - 10.4 MG/DL   CBC W/O DIFF    Collection Time: 11/03/21  8:57 AM   Result Value Ref Range    WBC 10.4 4.3 - 11.1 K/uL    RBC 3.53 (L) 4.05 - 5.2 M/uL    HGB 11.6 (L) 11.7 - 15.4 g/dL    HCT 37.0 35.8 - 46.3 %    .8 (H) 79.6 - 97.8 FL    MCH 32.9 26.1 - 32.9 PG    MCHC 31.4 31.4 - 35.0 g/dL    RDW 14.6 11.9 - 14.6 %    PLATELET 265 439 - 941 K/uL    MPV 10.0 9.4 - 12.3 FL    ABSOLUTE NRBC 0.00 0.0 - 0.2 K/uL   PROCALCITONIN    Collection Time: 11/03/21  8:57 AM   Result Value Ref Range    Procalcitonin <0.05 0.00 - 0.49 ng/mL       All Micro Results     Procedure Component Value Units Date/Time    COVID-19 RAPID TEST [[id number]] Collected: 10/31/21 1103    Order Status: Completed Specimen: Nasopharyngeal Updated: 10/31/21 1126     Specimen source NASAL        Comment: RAPID ONLY        COVID-19 rapid test Not detected        Comment:      The specimen is NEGATIVE for SARS-CoV-2, the novel coronavirus associated with COVID-19. A negative result does not rule out COVID-19. This test has been authorized by the FDA under an Emergency Use Authorization (EUA) for use by authorized laboratories. Fact sheet for Healthcare Providers: ConventionUpdate.co.nz  Fact sheet for Patients: ConventionData Symmetrydate.co.nz       Methodology: Isothermal Nucleic Acid Amplification               Other Studies:  XR CHEST SNGL V    Result Date: 11/3/2021  EXAMINATION: One view chest HISTORY: eval hypoxia TECHNIQUE: Frontal chest. COMPARISON: 10/31/2021 FINDINGS:  Persistent bilateral lung infiltrates appear mildly improved. There is no significant pneumothorax. There is a shadow projecting over the left hemithorax. Its etiology is unclear. It does not appear to represent a pneumothorax. There is no pleural effusion. The heart is unchanged. No other significant interval changes. 1. Findings as described above. XR CHEST PA LAT    Result Date: 11/3/2021  PA AND LATERAL CHEST X-RAY.  Clinical Indication: Abnormal chest x-ray Comparison: Chest x-ray from earlier the same day Findings: 2 views of the chest submitted demonstrate post CABG changes in the mediastinum. The cardiac silhouette and mediastinum are stable. There is mild coarsening of interstitial markings without confluent airspace opacity. No abnormal shadows noted. There is no pleural effusion or pneumothorax. No acute cardiopulmonary abnormality.       Current Meds:  Current Facility-Administered Medications   Medication Dose Route Frequency    tuberculin injection 5 Units  5 Units IntraDERMal ONCE    apixaban (ELIQUIS) tablet 2.5 mg  2.5 mg Oral BID    ascorbic acid (vitamin C) (VITAMIN C) tablet 500 mg  500 mg Oral DAILY    buprenorphine (BUTRANS) 10 mcg/hour patch 1 Patch (Patient Supplied)  1 Patch TransDERmal Q7D    DULoxetine (CYMBALTA) capsule 60 mg  60 mg Oral DAILY    gabapentin (NEURONTIN) capsule 300 mg  300 mg Oral BID    gemfibroziL (LOPID) tablet 600 mg  600 mg Oral BID    latanoprost (XALATAN) 0.005 % ophthalmic solution 1 Drop  1 Drop Both Eyes QHS    levothyroxine (SYNTHROID) tablet 112 mcg  112 mcg Oral ACB    metoprolol succinate (TOPROL-XL) XL tablet 25 mg  25 mg Oral DAILY    nitroglycerin (NITROSTAT) tablet 0.4 mg  0.4 mg SubLINGual Q5MIN PRN    potassium chloride (K-DUR, KLOR-CON) SR tablet 20 mEq  20 mEq Oral BID    rOPINIRole (REQUIP) tablet 1 mg  1 mg Oral TID    rosuvastatin (CRESTOR) tablet 40 mg  40 mg Oral QHS    timolol (TIMOPTIC) 0.5 % ophthalmic solution 1 Drop  1 Drop Both Eyes DAILY    clindamycin (CLEOCIN) capsule 300 mg  300 mg Oral Q8H    sodium chloride (NS) flush 5-40 mL  5-40 mL IntraVENous Q8H    sodium chloride (NS) flush 5-40 mL  5-40 mL IntraVENous PRN    acetaminophen (TYLENOL) tablet 650 mg  650 mg Oral Q6H PRN    Or    acetaminophen (TYLENOL) suppository 650 mg  650 mg Rectal Q6H PRN    polyethylene glycol (MIRALAX) packet 17 g  17 g Oral DAILY PRN    ondansetron (ZOFRAN ODT) tablet 4 mg  4 mg Oral Q8H PRN    Or    ondansetron (ZOFRAN) injection 4 mg  4 mg IntraVENous Q6H PRN    magnesium sulfate 2 g/50 ml IVPB (premix or compounded)  2 g IntraVENous PRN    Saccharomyces boulardii (FLORASTOR) capsule 500 mg  500 mg Oral BID    furosemide (LASIX) injection 40 mg  40 mg IntraVENous DAILY     Labs, vital signs, diagnostic testing reviewed. Case discussed with patient, care team, Dr. Mathew Navarrete. Signed:  Mackenzie Alcazar NP    Part of this note may have been written by using a voice dictation software. The note has been proof read but may still contain some grammatical/other typographical errors.

## 2021-11-03 NOTE — PROGRESS NOTES
ACUTE OT GOALS:  (Developed with and agreed upon by patient and/or caregiver.)  1. Pt will toilet with SBA   2. Pt will complete functional mobility for ADLs with SBA using AD   3. Pt will complete lower body dressing (with the exception of socks and shoes) with SBA using AE as needed  4. Pt will complete grooming and hygiene at sink with SBA  5. Pt will demonstrate independence with HEP to promote increased BUE strength and functional use for ADLs  6. Pt will tolerate 23 minutes functional activity with min or fewer rest breaks to promote increased endurance for ADLs  7. Pt will independently demonstrate/ verbalize 2+ energy conservation techniques to promote increased endurance for ADLs        Timeframe: 7 days    OCCUPATIONAL THERAPY: Daily Note OT Treatment Day # 2    Sowmya Booker is a 80 y.o. female   PRIMARY DIAGNOSIS: Acute hypoxemic respiratory failure (HCC)  Acute hypoxemic respiratory failure (HCC) [J96.01]  Acute exacerbation of CHF (congestive heart failure) (HCC) [I50.9]  Cellulitis of right leg [L03.115]       [place] / [place] / Product Type: Medicare /   ASSESSMENT:     REHAB RECOMMENDATIONS: CURRENT LEVEL OF FUNCTION:  (Most Recently Demonstrated)   Recommendation to date pending progress:  [address] [place] [address]  Equipment:    None Bathing:   Contact Guard Assistance  Dressing:   Contact Guard Assistance  Feeding/Grooming:   Contact Guard Assistance  Toileting:   Contact Guard Assistance  Functional Mobility:   Contact Guard Assistance     ASSESSMENT:  Ms. Stefania Dover is doing better today, presented more alert and demonstrated improvement w/ ADLs and mobility for ADLs. Pt tolerated standing at sink ~15 minutes for ADLs with CGA, mobilized and completed ADLs with CGA overall using RW. SpO2 briefly < 89% following activity, recovered to 94% within ~1 minute with seated rest break. Pt is progressing towards goals, continue POC.       SUBJECTIVE: Ms. Jimenez Senate states, \"I'm so so. \"    SOCIAL HISTORY/LIVING ENVIRONMENT:   Support Systems: Child(miladys), Assisted Living    OBJECTIVE:     PAIN: VITAL SIGNS: LINES/DRAINS:   Pre Treatment: Pain Screen  Pain Scale 1: Numeric (0 - 10)  Pain Intensity 1: 0  Post Treatment: 0     O2 Device: Nasal cannula     ACTIVITIES OF DAILY LIVING: I Mod I S SBA CGA Min Mod Max Total NT Comments   BASIC ADLs:              Bathing/ Showering [] [] [] [] [] [] [] [] [] []    Toileting [] [] [] [] [x] [] [] [] [] []    Dressing [] [] [] [] [x] [] [] [] [] []    Feeding [] [] [] [] [] [] [] [] [] []    Grooming [] [] [] [] [x] [] [] [] [] []    Personal Device Care [] [] [] [] [] [] [] [] [] []    Functional Mobility [] [] [] [] [x] [] [] [] [] []    I=Independent, Mod I=Modified Independent, S=Supervision, SBA=Standby Assistance, CGA=Contact Guard Assistance,   Min=Minimal Assistance, Mod=Moderate Assistance, Max=Maximal Assistance, Total=Total Assistance, NT=Not Tested    MOBILITY: I Mod I S SBA CGA Min Mod Max Total  NT x2 Comments:   Supine to sit [] [] [] [] [] [] [] [] [] [] []    Sit to supine [] [] [] [] [] [] [] [] [] [] []    Sit to stand [] [] [] [] [x] [] [] [] [] [] []    Bed to chair [] [] [] [] [x] [] [] [] [] [] []    I=Independent, Mod I=Modified Independent, S=Supervision, SBA=Standby Assistance, CGA=Contact Guard Assistance,   Min=Minimal Assistance, Mod=Moderate Assistance, Max=Maximal Assistance, Total=Total Assistance, NT=Not Tested    FREQUENCY/DURATION: OT Plan of Care: 3 times/week for duration of hospital stay or until stated goals are met, whichever comes first.    TREATMENT:   TREATMENT:   ($$ Self Care/Home Management: 23-37 mins    )  Self Care (27 Minutes): Self care including Toileting, Lower Body Dressing, Grooming and Energy Conservation Training to increase independence and decrease level of assistance required.     TREATMENT GRID:  N/A    AFTER TREATMENT POSITION/PRECAUTIONS:  Chair, Needs within reach, RN notified and Visitors at bedside    INTERDISCIPLINARY COLLABORATION:  RN/PCT    TOTAL TREATMENT DURATION:  OT Patient Time In/Time Out  Time In: 1117  Time Out: [address]

## 2021-11-03 NOTE — PROGRESS NOTES
Oxygen Qualifier       Room air: SpO2 with O2 and liter flow   Resting SpO2  95%  95% on 0.5L   Ambulating SpO2  86% 88% on 1L  92% on 2L     Pt does qualify for home oxygen needs when ambulating.     Completed by:    Nader Matthews

## 2021-11-03 NOTE — PROGRESS NOTES
[facility] CARDIOLOGY PROGRESS NOTE           11/3/2021 11:56 AM    Admit Date: 10/31/2021      Subjective:   -Feels that her breathing has improved. Now on room air. Denies chest pain. ROS:  Cardiovascular:  As noted above    Objective:      Vitals:    11/02/21 2359 11/03/21 0428 11/03/21 0800 11/03/21 0802   BP: 124/86 134/73 (!) 133/59 (!) 133/59   Pulse: 63 68 67 66   Resp: 18 18 16 16   Temp: 97.5 °F (36.4 °C) 97.6 °F (36.4 °C) 98.1 °F (36.7 °C) 98.1 °F (36.7 °C)   SpO2: 95% 95% 95% 95%   Weight:  127 lb 11.2 oz (57.9 kg)     Height:           Physical Exam:  General-No Acute Distress  Neck- supple, mild JVD  CV-irregularly irregular heart rhythm, rate controlled, 2/6 holosystolic murmur heard at the apex. Lung-diminished in both bases bilaterally with scattered bibasilar rales. Abd- soft, nontender, nondistended  Ext- no edema bilaterally. Skin- warm and dry    Data Review:   Recent Labs     11/03/21  0857 11/03/21  0558 11/02/21  0611   NA  --  136 138   K  --  4.6 4.1   BUN  --  38* 39*   CREA  --  1.26* 1.26*   GLU  --  93 92   WBC 10.4  --  7.0   HGB 11.6*  --  10.1*   HCT 37.0  --  32.9*     --  257       Assessment/Plan:     Principal Problem:    Acute hypoxemic respiratory failure (HCC) (10/31/2021)  -Likely multifactorial.  Exam currently not grossly volume overloaded.  Reviewed chest x-ray imaging independently from today and it shows improvement compared to the one on 10/31/2021 with regards to pleural effusions but continues to show some amount of interstitial fullness. continue IV Lasix for now.    -Echocardiogram  images independently reviewed.     Preserved EF at 55 to 60%, moderate MR, moderate TR  ,severely dilated atria    Active Problems:    Hyperlipemia (3/29/2009)  -continue statin therapy       Coronary atherosclerosis of native coronary artery--PTCA 1997 LAD/diag (7/1/2011)  -Prior CABG.  Chest discomfort appears atypical with negative troponins.  Defer any consideration for ischemic work-up at this time.       Hypothyroidism (7/7/2011)   -Continue replacement. Atrial fibrillation ([facility] (7/13/2011)  -Permanent.  Adequately rate controlled.  Continue anticoagulation.        Diastolic CHF, acute on chronic ([facility] (12/25/2017)  -Being diuresed appropriately. Breathing better. Preserved LV systolic function on echo.      Chest pain (11/1/2021)-atypical-not concerning for ischemic etiology.     -Breathing is substantially better overall. Now on room air but still has a few bibasilar rales on exam.  -I think it is reasonable to discontinue Lasix from tomorrow onwards and put on Bumex 1 mg by mouth once daily. She was on Lasix 40 mg by mouth at home but clearly this was not sufficient. We have to try something different. She said the Lasix was not working.     Marilee Raymond MD  11/3/2021 11:56 AM

## 2021-11-03 NOTE — PROGRESS NOTES
Assumed [id number]. A/o, VSS, 2L NC. No new complaints at this time. Bed locked and low. Call bell, phone, and Tv remote within reach. Safety precautions in place. Hourly rounding completed on shift. All needs met. Will give report to oncoming nurse.

## 2021-11-03 NOTE — ROUTINE PROCESS
CHF teaching started to pt post introduction. Planners @ Bs and scale @ PAOLO. Planners addressed to [facility] to review. Emphasis to report worsening dyspnea quickly, verbalize understanding; 20mins Cardiac diet/ FR 
palliative care: ACP on file

## 2021-11-04 LAB
ANION GAP SERPL CALC-SCNC: 4 MMOL/L (ref 7–16)
BUN SERPL-MCNC: 36 MG/DL (ref 8–23)
CALCIUM SERPL-MCNC: 10 MG/DL (ref 8.3–10.4)
CHLORIDE SERPL-SCNC: 104 MMOL/L (ref 98–107)
CO2 SERPL-SCNC: 29 MMOL/L (ref 21–32)
CREAT SERPL-MCNC: 1.13 MG/DL (ref 0.6–1)
ERYTHROCYTE [DISTWIDTH] IN BLOOD BY AUTOMATED COUNT: 14.3 % (ref 11.9–14.6)
GLUCOSE SERPL-MCNC: 96 MG/DL (ref 65–100)
HCT VFR BLD AUTO: 34.4 % (ref 35.8–46.3)
HGB BLD-MCNC: 10.7 G/DL (ref 11.7–15.4)
MCH RBC QN AUTO: 32.3 PG (ref 26.1–32.9)
MCHC RBC AUTO-ENTMCNC: 31.1 G/DL (ref 31.4–35)
MCV RBC AUTO: 103.9 FL (ref 79.6–97.8)
MM INDURATION POC: 0 MM (ref 0–5)
NRBC # BLD: 0 K/UL (ref 0–0.2)
PLATELET # BLD AUTO: 258 K/UL (ref 150–450)
PMV BLD AUTO: 10 FL (ref 9.4–12.3)
POTASSIUM SERPL-SCNC: 4.3 MMOL/L (ref 3.5–5.1)
PPD POC: NEGATIVE NEGATIVE
RBC # BLD AUTO: 3.31 M/UL (ref 4.05–5.2)
SODIUM SERPL-SCNC: 137 MMOL/L (ref 136–145)
WBC # BLD AUTO: 10.2 K/UL (ref 4.3–11.1)

## 2021-11-04 PROCEDURE — 97530 THERAPEUTIC ACTIVITIES: CPT

## 2021-11-04 PROCEDURE — 74011250637 HC RX REV CODE- 250/637: Performed by: STUDENT IN AN ORGANIZED HEALTH CARE EDUCATION/TRAINING PROGRAM

## 2021-11-04 PROCEDURE — 80048 BASIC METABOLIC PNL TOTAL CA: CPT

## 2021-11-04 PROCEDURE — 74011250637 HC RX REV CODE- 250/637: Performed by: HOSPITALIST

## 2021-11-04 PROCEDURE — 99232 SBSQ HOSP IP/OBS MODERATE 35: CPT | Performed by: INTERNAL MEDICINE

## 2021-11-04 PROCEDURE — 65660000000 HC RM CCU STEPDOWN

## 2021-11-04 PROCEDURE — 74011250636 HC RX REV CODE- 250/636: Performed by: HOSPITALIST

## 2021-11-04 PROCEDURE — 36415 COLL VENOUS BLD VENIPUNCTURE: CPT

## 2021-11-04 PROCEDURE — 85027 COMPLETE CBC AUTOMATED: CPT

## 2021-11-04 RX ORDER — HYDROCODONE BITARTRATE AND ACETAMINOPHEN 5; 325 MG/1; MG/1
1 TABLET ORAL
Status: DISCONTINUED | OUTPATIENT
Start: 2021-11-04 | End: 2021-11-05 | Stop reason: HOSPADM

## 2021-11-04 RX ORDER — ROPINIROLE 1 MG/1
1 TABLET, FILM COATED ORAL
Status: DISCONTINUED | OUTPATIENT
Start: 2021-11-05 | End: 2021-11-05 | Stop reason: HOSPADM

## 2021-11-04 RX ADMIN — Medication 5 ML: at 14:39

## 2021-11-04 RX ADMIN — Medication 10 ML: at 05:20

## 2021-11-04 RX ADMIN — DULOXETINE HYDROCHLORIDE 60 MG: 60 CAPSULE, DELAYED RELEASE ORAL at 08:10

## 2021-11-04 RX ADMIN — ROPINIROLE HYDROCHLORIDE 1 MG: 1 TABLET, FILM COATED ORAL at 11:55

## 2021-11-04 RX ADMIN — Medication 500 MG: at 17:23

## 2021-11-04 RX ADMIN — METOPROLOL SUCCINATE 25 MG: 25 TABLET, EXTENDED RELEASE ORAL at 08:10

## 2021-11-04 RX ADMIN — Medication 500 MG: at 08:10

## 2021-11-04 RX ADMIN — TIMOLOL MALEATE 1 DROP: 5 SOLUTION/ DROPS OPHTHALMIC at 08:14

## 2021-11-04 RX ADMIN — Medication 10 ML: at 21:00

## 2021-11-04 RX ADMIN — GEMFIBROZIL 600 MG: 600 TABLET ORAL at 08:10

## 2021-11-04 RX ADMIN — APIXABAN 2.5 MG: 2.5 TABLET, FILM COATED ORAL at 08:10

## 2021-11-04 RX ADMIN — CLINDAMYCIN HYDROCHLORIDE 300 MG: 150 CAPSULE ORAL at 21:00

## 2021-11-04 RX ADMIN — LEVOTHYROXINE SODIUM 112 MCG: 0.11 TABLET ORAL at 05:20

## 2021-11-04 RX ADMIN — POTASSIUM CHLORIDE 20 MEQ: 20 TABLET, EXTENDED RELEASE ORAL at 17:24

## 2021-11-04 RX ADMIN — GABAPENTIN 300 MG: 300 CAPSULE ORAL at 08:10

## 2021-11-04 RX ADMIN — OXYCODONE HYDROCHLORIDE AND ACETAMINOPHEN 500 MG: 500 TABLET ORAL at 08:10

## 2021-11-04 RX ADMIN — ROSUVASTATIN CALCIUM 40 MG: 20 TABLET, COATED ORAL at 21:00

## 2021-11-04 RX ADMIN — CLINDAMYCIN HYDROCHLORIDE 300 MG: 150 CAPSULE ORAL at 14:39

## 2021-11-04 RX ADMIN — GABAPENTIN 300 MG: 300 CAPSULE ORAL at 21:00

## 2021-11-04 RX ADMIN — GEMFIBROZIL 600 MG: 600 TABLET ORAL at 17:24

## 2021-11-04 RX ADMIN — APIXABAN 2.5 MG: 2.5 TABLET, FILM COATED ORAL at 17:24

## 2021-11-04 RX ADMIN — POTASSIUM CHLORIDE 20 MEQ: 20 TABLET, EXTENDED RELEASE ORAL at 08:09

## 2021-11-04 RX ADMIN — FUROSEMIDE 40 MG: 10 INJECTION, SOLUTION INTRAMUSCULAR; INTRAVENOUS at 08:10

## 2021-11-04 RX ADMIN — LATANOPROST 1 DROP: 50 SOLUTION OPHTHALMIC at 21:04

## 2021-11-04 RX ADMIN — CLINDAMYCIN HYDROCHLORIDE 300 MG: 150 CAPSULE ORAL at 05:20

## 2021-11-04 NOTE — PROGRESS NOTES
Assumed 4921-5332. A/o, VSS. No new complaints at this time. Bed locked and low. Call bell, phone, and Tv remote within reach. Safety precautions in place. Hourly rounding completed on shift. All needs met. Will give report to oncoming nurse.

## 2021-11-04 NOTE — PROGRESS NOTES
ACUTE PHYSICAL THERAPY GOALS:  (Developed with and agreed upon by patient and/or caregiver. )  LTG:  (1.)Ms. Josué Alonso will move from supine to sit and sit to supine , scoot up and down and roll side to side in bed with INDEPENDENT within 7 treatment day(s). (2.)Ms. Josué Alonso will transfer from bed to chair and chair to bed with MODIFIED INDEPENDENCE using the least restrictive device within 7 treatment day(s). (3.)Ms. Josué Alonso will ambulate with SUPERVISION for 150' feet with the least restrictive device within 7 treatment day(s). PHYSICAL THERAPY: Daily Note and AM Treatment Day # 4    Regis Villalobos is a 80 y.o. female   PRIMARY DIAGNOSIS: Acute hypoxemic respiratory failure (HCC)  Acute hypoxemic respiratory failure (HCC) [J96.01]  Acute exacerbation of CHF (congestive heart failure) ([facility] [address] [I50.9]  Cellulitis of right leg [W38.041]         ASSESSMENT:     REHAB RECOMMENDATIONS: CURRENT LEVEL OF FUNCTION:  (Most Recently Demonstrated)   Recommendation to date pending progress:  [address] [place] [address]  Equipment:    To Be Determined Bed Mobility:   Not tested  Sit to Stand:  [employer] Assistance  Transfers:  [address]:   Contact Guard Assistance     ASSESSMENT:  Ms. Josué Alonso continues to make good progress towards PT goals as noted by increased gait distance, activity tolerance and overall mobility. Patient needed 2L O2 to maintain saturations over 90% during ambulation in hallway. Patient demonstrates fair+ standing balance during standing ADL activity. Will continue efforts. SUBJECTIVE:   Ms. Josué Alonso states, \"I am the furthest away from the dining ramon\"    SOCIAL HISTORY/ LIVING ENVIRONMENT: lives in [facility], independent with rollator.   Support Systems: Child(miladys), Assisted Living  OBJECTIVE:     PAIN: VITAL SIGNS: LINES/DRAINS:   Pre Treatment: Pain Screen  Pain Scale 1: FLACC  Pain Intensity 1: 0  Post Treatment: 0   Purewick  O2 Device: Nasal cannula MOBILITY: I Mod I S SBA CGA Min Mod Max Total  NT x2 Comments:   Bed Mobility    Rolling [] [] [] [] [] [] [] [] [] [] []    Supine to Sit [] [] [] [] [] [] [] [] [] [] []    Scooting [] [] [] [] [] [] [] [] [] [] []    Sit to Supine [] [] [] [] [] [] [] [] [] [] []    Transfers    Sit to Stand [] [] [] [] [x] [] [] [] [] [] []    Bed to Chair [] [] [] [] [x] [] [] [] [] [] []    Stand to Sit [] [] [] [] [x] [] [] [] [] [] []    I=Independent, Mod I=Modified Independent, S=Supervision, SBA=Standby Assistance, CGA=Contact Guard Assistance,   Min=Minimal Assistance, Mod=Moderate Assistance, Max=Maximal Assistance, Total=Total Assistance, NT=Not Tested    BALANCE: Good Fair+ Fair Fair- Poor NT Comments   Sitting Static [x] [] [] [] [] []    Sitting Dynamic [x] [] [] [] [] []              Standing Static [] [x] [] [] [] []    Standing Dynamic [] [] [x] [] [] []      GAIT: I Mod I S SBA CGA Min Mod Max Total  NT x2 Comments:   Level of Assistance [] [] [] [] [x] [] [] [] [] [] []    Distance 175'    DME Rolling Walker and Gait Belt    Gait Quality Slow, short steps, flexed posture    Weightbearing  Status N/A     I=Independent, Mod I=Modified Independent, S=Supervision, SBA=Standby Assistance, CGA=Contact Guard Assistance,   Min=Minimal Assistance, Mod=Moderate Assistance, Max=Maximal Assistance, Total=Total Assistance, NT=Not Tested    PLAN:   FREQUENCY/DURATION: PT Plan of Care: 3 times/week for duration of hospital stay or until stated goals are met, whichever comes first.  TREATMENT:     TREATMENT:   ($$ Therapeutic Activity: 23-37 mins    )  Therapeutic Activity (25 Minutes): Therapeutic activity included Supine to Sit, Scooting, Transfer Training, Ambulation on level ground and Standing balance to improve functional Mobility, Strength and Activity tolerance.     TREATMENT GRID:  N/A    AFTER TREATMENT POSITION/PRECAUTIONS:  Chair, Needs within reach and RN notified    INTERDISCIPLINARY COLLABORATION:  RN/PCT and PT/PTA    TOTAL TREATMENT DURATION:  PT Patient Time In/Time Out  Time In: 1335  Time Out: 1400    Mikala Robles PTA

## 2021-11-04 NOTE — PROGRESS NOTES
Physician Progress Note      PATIENT:               Connie Dumas  CSN #:                  [id number]  :                       1931  ADMIT DATE:       10/31/2021 9:27 AM  DISCH DATE:  RESPONDING  PROVIDER #:        Raine Ashby NP          QUERY TEXT:    Patient admitted with acute on chronic diastolic heart failure, noted to have permanent  atrial fibrillation and is maintained on Eliquis. If possible, please document in progress notes and discharge summary if you are evaluating and/or treating any of the following: The medical record reflects the following:  Risk Factors: afib  Clinical Indicators: 81yo female with history of CHF  Treatment: Eliquis  Options provided:  -- Secondary hypercoagulable state in a patient with atrial fibrillation  -- Other - I will add my own diagnosis  -- Disagree - Not applicable / Not valid  -- Disagree - Clinically unable to determine / Unknown  -- Refer to Clinical Documentation Reviewer    PROVIDER RESPONSE TEXT:    This patient has secondary hypercoagulable state related to atrial fibrillation.     Query created by: Jayshree Jackson on 2021 2:02 PM      Electronically signed by:  Raine Ashby NP 2021 2:28 PM

## 2021-11-04 NOTE — PROGRESS NOTES
Hospitalist Progress Note   Admit Date:  10/31/2021  9:27 AM   Name:  Cristino Dias   Age:  [address] y[address]o. Sex:  female  :  1931   MRN:  [id number]   Room:  [facility]/    Presenting Complaint: Chest Pain (Angina)    Reason(s) for Admission: Acute hypoxemic respiratory failure ([facility] [address] [J96.01]  Acute exacerbation of CHF (congestive heart failure) ([facility] [address] [I50.9]  Cellulitis of right leg [L03.115]     Hospital Course & Interval History:   Ms. Singleton is a [address] y/o female with PMH of hypertension, CAD, atrial fibrillation (on Eliquis), diastolic HF who was brought to the ER by EMS from [facility] because of chest heaviness.  She was given sublingual nitro x2 and ASA and her chest pain improved. She had substernal chest pain with no radiation prior to presenting. She became hypoxic in the ER w/ SpO2 80-85%; pO2 was 52.  CXR showed pulmonary edema.  She was admitted to the Hospitalist service with acute hypoxemic respiratory failure and acute on chronic diastolic HF exacerbation. Cardiology was consulted. Subjective (21):  No AEO. Ms. Sebastien Adan denies CP/SOB. She says she is feeling better. Walk test in the morning and can hopefully discharge back to her [facility].     Assessment & Plan:     Acute hypoxemic respiratory failure (HCC), improving  -O2 sat on presentation 80 to 85% on RA with PO2 of 52 on ABG  -CXR w /pulmonary edema and R pleural effusion  -Likely due to volume overload  -s/p Lasix 40 mg IV x 1 in ER, continue 40 mg IV daily  -Walk test 11/3, RA at rest but requires 2L NC with exertion  -Unable to return to halfway directly with oxygen and family wants to avoid rehab stay, continue diuresis, perform walk test  to evaluate ambulation needs     Acute on Chronic Diastolic HF Exacerbation  Chest pain/CAD  -CXR w/pulmonary edema and right pleural effusion  -pBNP 2615  -s/p Lasix 40 mg IV x 1 in ER, continue 40 mg IV daily  -Cardiology following  -Continue BB and statin  -2 gm Na and 1800 mL fluid restriction  -Daily weights, strict I&O's  -Troponins negative x 2, no indication for ischemic work-up  -Echo 44/6 with LV diastolic dysfunction, mod MR and TR, severely dilated atria, RVSP 58 mmHg/mod PAH  -Cardiology with plans to place on Bumex 1mg PO daily at discharge     Right leg cellulitis, improved  -RLE erythema, warmth, tenderness on presentation   -US negative for DVT  -Continue Clindamycin PO with EOT 11/7  -RLE with mild erythema on rounding today     Macrocytic anemia, stable  -B12 and folate wnl     Atrial fibrillation, permanent  -Continue home metoprolol, apixaban     Hypothyroidism  -Continue levothyroxine     Dyslipidemia  -Continue statin     Depression  -Continue duloxetine      Dispo/Discharge Planning: Hopefully tomorrow to [facility]    Diet:  ADULT DIET Regular; Low Fat/Low Chol/High Fiber/2 gm Na; 1800 ml  DVT PPx: apixaban  Code status: DNR    Hospital Problems as of 11/4/2021 Date Reviewed: 6/22/2021          Codes Class Noted - Resolved POA    Chest pain ICD-10-CM: R07.9  ICD-9-CM: 786.50  11/1/2021 - Present Unknown        SOB (shortness of breath) ICD-10-CM: R06.02  ICD-9-CM: 786.05  11/1/2021 - Present Unknown        Acute exacerbation of CHF (congestive heart failure) ([facility] [address] ICD-10-CM: I50.9  ICD-9-CM: 428.0  10/31/2021 - Present         Cellulitis of right leg ICD-10-CM: L03.115  ICD-9-CM: 682.6  10/31/2021 - Present Unknown        * (Principal) Acute hypoxemic respiratory failure (HCC) ICD-10-CM: J96.01  ICD-9-CM: 518.81  10/31/2021 - Present Unknown        Diastolic CHF, acute on chronic (HCC) ICD-10-CM: I50.33  ICD-9-CM: 428.33, 428.0  12/25/2017 - Present Yes        Atrial fibrillation (HCC) ICD-10-CM: I48.91  ICD-9-CM: 427.31  7/13/2011 - Present Yes        Hypothyroidism (Chronic) ICD-10-CM: E03.9  ICD-9-CM: 244.9  7/7/2011 - Present Yes        Coronary atherosclerosis of native coronary artery--PTCA 1997 LAD/diag (Chronic) ICD-10-CM: I25.10  ICD-9-CM: 414.01  7/1/2011 - Present Yes        Mitral valve insufficiency (Chronic) ICD-10-CM: I34.0  ICD-9-CM: 424.0  4/2/2009 - Present Yes        Hyperlipemia (Chronic) ICD-10-CM: E78.5  ICD-9-CM: 272.4  3/29/2009 - Present Yes              Objective:     Patient Vitals for the past 24 hrs:   Temp Pulse Resp BP SpO2   11/04/21 1108 97.9 °F (36.6 °C) 63 18 139/66 97 %   11/04/21 0801 97.7 °F (36.5 °C) 64 17 (!) 106/59 95 %   11/04/21 0427 97.4 °F (36.3 °C) 72 16 117/76 98 %   11/03/21 2328 97.6 °F (36.4 °C) 67 16 (!) 124/53 92 %   11/03/21 2034    111/70    11/03/21 1944 98.4 °F (36.9 °C) 67 16 (!) 113/48 94 %     Oxygen Therapy  O2 Sat (%): 97 % (11/04/21 1108)  Pulse via Oximetry: 78 beats per minute (11/01/21 0407)  O2 Device: Nasal cannula (11/02/21 1405)  Skin Assessment: Clean, dry, & intact (10/31/21 1945)  O2 Flow Rate (L/min): 1 l/min (11/02/21 1405)    Estimated body mass index is 24.45 kg/m² as calculated from the following:    Height as of this encounter: 5' (1.524 m). Weight as of this encounter: 56.8 kg (125 lb 3.2 oz). No intake or output data in the 24 hours ending 11/04/21 1558      Physical Exam:   Blood pressure 139/66, pulse 63, temperature 97.9 °F (36.6 °C), resp. rate 18, height 5' (1.524 m), weight 56.8 kg (125 lb 3.2 oz), SpO2 97 %. General:    No overt distress  Head:  Normocephalic, atraumatic  Eyes:  Sclerae appear normal.  Pupils equally round. ENT:  Nares appear normal, no drainage. Moist oral mucosa  Neck:  No restricted ROM. Trachea midline   CV:   Irr irr.  + murmur   Lungs: No wheezing. Respirations even, unlabored on room air at rest.  Abdomen:   Soft, nontender, nondistended. Extremities: No cyanosis or clubbing. Erythema to R LE, improved. Skin:     No rashes and normal coloration. Warm and dry. Neuro:  CN II-XII grossly intact. A&Ox3  Psych:  Normal mood and affect.       I have reviewed ordered lab tests and independently visualized imaging below:    Recent Labs:  Recent Results (from the past 48 hour(s))   METABOLIC PANEL, BASIC    Collection Time: 11/03/21  5:58 AM   Result Value Ref Range    Sodium 136 136 - 145 mmol/L    Potassium 4.6 3.5 - 5.1 mmol/L    Chloride 105 98 - 107 mmol/L    CO2 26 21 - 32 mmol/L    Anion gap 5 (L) 7 - 16 mmol/L    Glucose 93 65 - 100 mg/dL    BUN 38 (H) 8 - 23 MG/DL    Creatinine 1.26 (H) 0.6 - 1.0 MG/DL    GFR est AA 51 (L) >60 ml/min/1.73m2    GFR est non-AA 42 (L) >60 ml/min/1.73m2    Calcium 10.0 8.3 - 10.4 MG/DL   CBC W/O DIFF    Collection Time: 11/03/21  8:57 AM   Result Value Ref Range    WBC 10.4 4.3 - 11.1 K/uL    RBC 3.53 (L) 4.05 - 5.2 M/uL    HGB 11.6 (L) 11.7 - 15.4 g/dL    HCT 37.0 35.8 - 46.3 %    .8 (H) 79.6 - 97.8 FL    MCH 32.9 26.1 - 32.9 PG    MCHC 31.4 31.4 - 35.0 g/dL    RDW 14.6 11.9 - 14.6 %    PLATELET 428 550 - 957 K/uL    MPV 10.0 9.4 - 12.3 FL    ABSOLUTE NRBC 0.00 0.0 - 0.2 K/uL   PROCALCITONIN    Collection Time: 11/03/21  8:57 AM   Result Value Ref Range    Procalcitonin <0.05 0.00 - 0.49 ng/mL   CBC W/O DIFF    Collection Time: 11/04/21  6:46 AM   Result Value Ref Range    WBC 10.2 4.3 - 11.1 K/uL    RBC 3.31 (L) 4.05 - 5.2 M/uL    HGB 10.7 (L) 11.7 - 15.4 g/dL    HCT 34.4 (L) 35.8 - 46.3 %    .9 (H) 79.6 - 97.8 FL    MCH 32.3 26.1 - 32.9 PG    MCHC 31.1 (L) 31.4 - 35.0 g/dL    RDW 14.3 11.9 - 14.6 %    PLATELET 293 927 - 215 K/uL    MPV 10.0 9.4 - 12.3 FL    ABSOLUTE NRBC 0.00 0.0 - 0.2 K/uL   METABOLIC PANEL, BASIC    Collection Time: 11/04/21  6:46 AM   Result Value Ref Range    Sodium 137 136 - 145 mmol/L    Potassium 4.3 3.5 - 5.1 mmol/L    Chloride 104 98 - 107 mmol/L    CO2 29 21 - 32 mmol/L    Anion gap 4 (L) 7 - 16 mmol/L    Glucose 96 65 - 100 mg/dL    BUN 36 (H) 8 - 23 MG/DL    Creatinine 1.13 (H) 0.6 - 1.0 MG/DL    GFR est AA 58 (L) >60 ml/min/1.73m2    GFR est non-AA 48 (L) >60 ml/min/1.73m2    Calcium 10.0 8.3 - 10.4 MG/DL   PLEASE READ & DOCUMENT PPD TEST IN 24 HRS    Collection Time: 11/04/21 2:59 PM   Result Value Ref Range    PPD Negative Negative    mm Induration 0 0 - 5 mm       All Micro Results     Procedure Component Value Units Date/Time    COVID-19 RAPID TEST [[id number]] Collected: 10/31/21 1103    Order Status: Completed Specimen: Nasopharyngeal Updated: 10/31/21 1126     Specimen source NASAL        Comment: RAPID ONLY        COVID-19 rapid test Not detected        Comment:      The specimen is NEGATIVE for SARS-CoV-2, the novel coronavirus associated with COVID-19. A negative result does not rule out COVID-19. This test has been authorized by the FDA under an Emergency Use Authorization (EUA) for use by authorized laboratories. Fact sheet for Healthcare Providers: Postcron.co.nz  Fact sheet for Patients: Philrealestatesco.nz       Methodology: Isothermal Nucleic Acid Amplification               Other Studies:  No results found.     Current Meds:  Current Facility-Administered Medications   Medication Dose Route Frequency    HYDROcodone-acetaminophen (NORCO) 5-325 mg per tablet 1 Tablet  1 Tablet Oral Q6H PRN    [START ON 11/5/2021] rOPINIRole (REQUIP) tablet 1 mg  1 mg Oral QHS    apixaban (ELIQUIS) tablet 2.5 mg  2.5 mg Oral BID    ascorbic acid (vitamin C) (VITAMIN C) tablet 500 mg  500 mg Oral DAILY    buprenorphine (BUTRANS) 10 mcg/hour patch 1 Patch (Patient Supplied)  1 Patch TransDERmal Q7D    DULoxetine (CYMBALTA) capsule 60 mg  60 mg Oral DAILY    gabapentin (NEURONTIN) capsule 300 mg  300 mg Oral BID    gemfibroziL (LOPID) tablet 600 mg  600 mg Oral BID    latanoprost (XALATAN) 0.005 % ophthalmic solution 1 Drop  1 Drop Both Eyes QHS    levothyroxine (SYNTHROID) tablet 112 mcg  112 mcg Oral ACB    metoprolol succinate (TOPROL-XL) XL tablet 25 mg  25 mg Oral DAILY    nitroglycerin (NITROSTAT) tablet 0.4 mg  0.4 mg SubLINGual Q5MIN PRN    potassium chloride (K-DUR, KLOR-CON) SR tablet 20 mEq  20 mEq Oral BID    rosuvastatin (CRESTOR) tablet 40 mg  40 mg Oral QHS    timolol (TIMOPTIC) 0.5 % ophthalmic solution 1 Drop  1 Drop Both Eyes DAILY    clindamycin (CLEOCIN) capsule 300 mg  300 mg Oral Q8H    sodium chloride (NS) flush 5-40 mL  5-40 mL IntraVENous Q8H    sodium chloride (NS) flush 5-40 mL  5-40 mL IntraVENous PRN    acetaminophen (TYLENOL) tablet 650 mg  650 mg Oral Q6H PRN    Or    acetaminophen (TYLENOL) suppository 650 mg  650 mg Rectal Q6H PRN    polyethylene glycol (MIRALAX) packet 17 g  17 g Oral DAILY PRN    ondansetron (ZOFRAN ODT) tablet 4 mg  4 mg Oral Q8H PRN    Or    ondansetron (ZOFRAN) injection 4 mg  4 mg IntraVENous Q6H PRN    magnesium sulfate 2 g/50 ml IVPB (premix or compounded)  2 g IntraVENous PRN    Saccharomyces boulardii (FLORASTOR) capsule 500 mg  500 mg Oral BID    furosemide (LASIX) injection 40 mg  40 mg IntraVENous DAILY       Signed:  Kayla Leone NP    Part of this note may have been written by using a voice dictation software. The note has been proof read but may still contain some grammatical/other typographical errors.

## 2021-11-04 NOTE — PROGRESS NOTES
[facility] CARDIOLOGY PROGRESS NOTE           11/4/2021 9:15 AM    Admit Date: 10/31/2021      Subjective:   Patient reports that her breathing is much improved. Denies cough, wheeze, shortness of breath, leg swelling, abdominal pain, chest pain, nausea, vomiting. No other complaints at this time. ROS:  Cardiovascular:  As noted above    Objective:      Vitals:    11/03/21 2034 11/03/21 2328 11/04/21 0427 11/04/21 0801   BP: 111/70 (!) 124/53 117/76 (!) 106/59   Pulse:  67 72 64   Resp:  16 16 17   Temp:  97.6 °F (36.4 °C) 97.4 °F (36.3 °C) 97.7 °F (36.5 °C)   SpO2:  92% 98% 95%   Weight:   125 lb 3.2 oz (56.8 kg)    Height:           Physical Exam:  General-No Acute Distress, awake, alert, interactive  Neck- supple, no JVD  CV- irregular rate and rhythm , 2/6 systolic murmur left lower sternal border  Lung- minimal Rales at the bases bilaterally, nonlabored, no wheezes  Abd- soft, nontender, nondistended  Ext- no edema bilaterally. Skin- warm and dry    Data Review:   Recent Labs     11/04/21  0646 11/03/21  0857 11/03/21  0558 11/02/21  0611     --  136  --    K 4.3  --  4.6  --    BUN 36*  --  38*  --    CREA 1.13*  --  1.26*  --    GLU 96  --  93  --    WBC 10.2 10.4  --    < >   HGB 10.7* 11.6*  --    < >   HCT 34.4* 37.0  --    < >    283  --    < >    < > = values in this interval not displayed. Assessment/Plan:     Principal Problem:    Acute hypoxemic respiratory failure ([address] (10/31/2021)  Likely multifactorial, volume status improved  LVEF preserved, elevated RVSP, moderate TR, moderate MR  Appears nearly euvolemic today  We will transition up to 1 mg p.o.  Bumex daily  -Heart rate and blood pressure adequately controlled, renal function improved    Active Problems:    Hyperlipemia (3/29/2009)  On statin      Mitral valve insufficiency (4/2/2009)  Control heart rate, blood pressure, volume status with diuretics  Appears nearly euvolemic today Coronary atherosclerosis of native coronary artery--PTCA 1997 LAD/diag (7/1/2011)  No ongoing chest pain, no indication for Billy angiography at this time      Hypothyroidism (7/7/2011)  Per primary      Atrial fibrillation ([facility] [address] (7/13/2011)  Rates controlled, on anticoagulation  - If patient is ?[de-identified]years of age and either weighs ? 60 kg or has a serum creatinine ?1.5 mg/dL (133 mcmol/L), then reduce Eliquis dose to 2.5 mg twice daily. Cellulitis of right leg (10/31/2021)  Per medicine    Will need follow-up in 1 to 2 weeks with cardiology (Dr. Silva Narayanan). Will arrange.           Lucas Bonilla DO  11/4/2021 9:15 AM

## 2021-11-04 NOTE — PROGRESS NOTES
MECCA received call from Ray Pond at [address] MECCA notified her that patient will have a repeat walk test tomorrow to determine if oxygen will be needed at discharge. Ray Pond states that if oxygen is needed and will be at a set liter flow without the need for monitoring or regulation, then patient may return to facility at discharge. MECCA will call her with update tomorrow and will assist with discharge needs.

## 2021-11-05 VITALS
TEMPERATURE: 97.6 F | DIASTOLIC BLOOD PRESSURE: 59 MMHG | RESPIRATION RATE: 18 BRPM | BODY MASS INDEX: 24.7 KG/M2 | HEIGHT: 60 IN | HEART RATE: 69 BPM | SYSTOLIC BLOOD PRESSURE: 118 MMHG | OXYGEN SATURATION: 92 % | WEIGHT: 125.8 LBS

## 2021-11-05 LAB
ATRIAL RATE: 375 BPM
CALCULATED R AXIS, ECG10: -67 DEGREES
CALCULATED T AXIS, ECG11: 90 DEGREES
DIAGNOSIS, 93000: NORMAL
MM INDURATION POC: 0 MM (ref 0–5)
PPD POC: NEGATIVE NEGATIVE
Q-T INTERVAL, ECG07: 410 MS
QRS DURATION, ECG06: 128 MS
QTC CALCULATION (BEZET), ECG08: 429 MS
VENTRICULAR RATE, ECG03: 66 BPM

## 2021-11-05 PROCEDURE — 74011250637 HC RX REV CODE- 250/637: Performed by: NURSE PRACTITIONER

## 2021-11-05 PROCEDURE — 97535 SELF CARE MNGMENT TRAINING: CPT

## 2021-11-05 PROCEDURE — 93005 ELECTROCARDIOGRAM TRACING: CPT | Performed by: INTERNAL MEDICINE

## 2021-11-05 PROCEDURE — 94761 N-INVAS EAR/PLS OXIMETRY MLT: CPT

## 2021-11-05 PROCEDURE — 74011250636 HC RX REV CODE- 250/636: Performed by: HOSPITALIST

## 2021-11-05 PROCEDURE — 74011250637 HC RX REV CODE- 250/637: Performed by: STUDENT IN AN ORGANIZED HEALTH CARE EDUCATION/TRAINING PROGRAM

## 2021-11-05 PROCEDURE — 97530 THERAPEUTIC ACTIVITIES: CPT

## 2021-11-05 PROCEDURE — 74011250637 HC RX REV CODE- 250/637: Performed by: HOSPITALIST

## 2021-11-05 RX ORDER — FAMOTIDINE 20 MG/1
40 TABLET, FILM COATED ORAL ONCE
Status: COMPLETED | OUTPATIENT
Start: 2021-11-05 | End: 2021-11-05

## 2021-11-05 RX ORDER — BUMETANIDE 1 MG/1
1 TABLET ORAL DAILY
Qty: 30 TABLET | Refills: 0 | Status: SHIPPED | OUTPATIENT
Start: 2021-11-06 | End: 2021-11-16 | Stop reason: SDUPTHER

## 2021-11-05 RX ORDER — CLINDAMYCIN HYDROCHLORIDE 300 MG/1
300 CAPSULE ORAL 3 TIMES DAILY
Qty: 6 CAPSULE | Refills: 0 | Status: SHIPPED | OUTPATIENT
Start: 2021-11-06 | End: 2021-11-08

## 2021-11-05 RX ORDER — ROPINIROLE 1 MG/1
1 TABLET, FILM COATED ORAL
Qty: 30 TABLET | Refills: 0 | Status: SHIPPED | OUTPATIENT
Start: 2021-11-05 | End: 2021-12-05

## 2021-11-05 RX ORDER — SAME BUTANEDISULFONATE/BETAINE 400-600 MG
500 POWDER IN PACKET (EA) ORAL 2 TIMES DAILY
Qty: 8 CAPSULE | Refills: 0 | Status: SHIPPED | OUTPATIENT
Start: 2021-11-05 | End: 2021-11-07

## 2021-11-05 RX ADMIN — CLINDAMYCIN HYDROCHLORIDE 300 MG: 150 CAPSULE ORAL at 05:49

## 2021-11-05 RX ADMIN — HYDROCODONE BITARTRATE AND ACETAMINOPHEN 1 TABLET: 5; 325 TABLET ORAL at 11:57

## 2021-11-05 RX ADMIN — DULOXETINE HYDROCHLORIDE 60 MG: 60 CAPSULE, DELAYED RELEASE ORAL at 08:10

## 2021-11-05 RX ADMIN — FUROSEMIDE 40 MG: 10 INJECTION, SOLUTION INTRAMUSCULAR; INTRAVENOUS at 08:10

## 2021-11-05 RX ADMIN — GABAPENTIN 300 MG: 300 CAPSULE ORAL at 08:10

## 2021-11-05 RX ADMIN — POTASSIUM CHLORIDE 20 MEQ: 20 TABLET, EXTENDED RELEASE ORAL at 08:10

## 2021-11-05 RX ADMIN — Medication 10 ML: at 14:16

## 2021-11-05 RX ADMIN — TIMOLOL MALEATE 1 DROP: 5 SOLUTION/ DROPS OPHTHALMIC at 08:11

## 2021-11-05 RX ADMIN — FAMOTIDINE 40 MG: 20 TABLET, FILM COATED ORAL at 09:44

## 2021-11-05 RX ADMIN — CLINDAMYCIN HYDROCHLORIDE 300 MG: 150 CAPSULE ORAL at 14:14

## 2021-11-05 RX ADMIN — OXYCODONE HYDROCHLORIDE AND ACETAMINOPHEN 500 MG: 500 TABLET ORAL at 08:10

## 2021-11-05 RX ADMIN — Medication 10 ML: at 05:49

## 2021-11-05 RX ADMIN — APIXABAN 2.5 MG: 2.5 TABLET, FILM COATED ORAL at 08:10

## 2021-11-05 RX ADMIN — Medication 500 MG: at 08:10

## 2021-11-05 RX ADMIN — METOPROLOL SUCCINATE 25 MG: 25 TABLET, EXTENDED RELEASE ORAL at 08:10

## 2021-11-05 RX ADMIN — LEVOTHYROXINE SODIUM 112 MCG: 0.11 TABLET ORAL at 05:49

## 2021-11-05 RX ADMIN — GEMFIBROZIL 600 MG: 600 TABLET ORAL at 08:10

## 2021-11-05 NOTE — PROGRESS NOTES
CM chart review; discussed in IDR. Patient will require 1L oxygen with ambulation. CM attempted to contact Jonas Dakins, at [facility] to confirm discharge today on oxygen; left VM. CM will follow. Update 1329: CM placed additional call to [facility]; left VM for Jadon Patten requesting return call related to discharge today. CM spoke with [facility] and verified that they will be able to supply oxygen at [facility] at discharge. Patient and daughter updated at bedside. CM will continue to follow.

## 2021-11-05 NOTE — PROGRESS NOTES
I have reviewed discharge instructions with the daughter. The daughter verbalized understanding. Daughter transporting patient to [facility]. IV taken out of Left wrist. Paperwork given to daughter to take to [facility] along with paper RX's. Daughter given instructions on O2 tank with tubing. O2 tank and tubing taken with daughter. Patient taken out via wheelchair by staff member at this time.

## 2021-11-05 NOTE — PROGRESS NOTES
Patient to be discharged back to [facility] today. She will require 1L oxygen with ambulation. CM has spoken with Margo Tate, and verified that patient may return with oxygen; she requested that concentrator and supplies be delivered prior to patient arriving. Referral for oxygen sent to [place] [id number]; portable tank delivered to room prior to discharge. CM spoke with Nba at [facility];  is in route to facility to deliver concentrator. CM spoke with Radha Beltre at facility and notified her of the above; she verbalized understanding. Discharge summary, scripts for all new/changed meds, and COVID test faxed to facility ([phone]), per request. Copies also placed in packet. Patient's daughter is at bedside and she will transport patient back to [facility]. CM will remain available should new needs arise. Care Management Interventions  PCP Verified by CM: Yes  Mode of Transport at Discharge: [address] (CM Consult): Assisted Living  Discharge Durable Medical Equipment: Yes (home oxygen)  Physical Therapy Consult: Yes  Occupational Therapy Consult: Yes  Support Systems: Child(miladys), Assisted Living  Confirm Follow Up Transport: Family  The Plan for Transition of Care is Related to the Following Treatment Goals : Return to previous level of function.   The Patient and/or Patient Representative was Provided with a Choice of Provider and Agrees with the Discharge Plan?: Yes  Freedom of Choice List was Provided with Basic Dialogue that Supports the Patient's Individualized Plan of Care/Goals, Treatment Preferences and Shares the Quality Data Associated with the Providers?: Yes  Discharge Location  Discharge Placement: Assisted Living [facility])

## 2021-11-05 NOTE — DISCHARGE SUMMARY
Hospitalist Discharge Summary   Admit Date:  10/31/2021  9:27 AM   DC Note date: 2021  Name:  Nikolas Jack   Age:  80 y.o.   Sex:  female  :  1931   MRN:  [id number]   Room:  Formerly named [facility]  PCP:  Carmela Dumont MD    Presenting Complaint: Chest Pain (Angina)    Initial Admission Diagnosis: Acute hypoxemic respiratory failure ([facility] [J96.01]  Acute exacerbation of CHF (congestive heart failure) ([facility] [I50.9]  Cellulitis of right leg [W07.508]     Problem List for this Hospitalization:  Hospital Problems as of 2021 Date Reviewed: 2021          Codes Class Noted - Resolved POA    Chest pain ICD-10-CM: R07.9  ICD-9-CM: 786.50  2021 - Present Unknown        SOB (shortness of breath) ICD-10-CM: R06.02  ICD-9-CM: 786.05  2021 - Present Unknown        Acute exacerbation of CHF (congestive heart failure) ([place] [address].) ICD-10-CM: I50.9  ICD-9-CM: 428.0  10/31/2021 - Present         Cellulitis of right leg ICD-10-CM: L03.115  ICD-9-CM: 682.6  10/31/2021 - Present Unknown        * (Principal) Acute hypoxemic respiratory failure ([facility] ICD-10-CM: J96.01  ICD-9-CM: 518.81  10/31/2021 - Present Unknown        Diastolic CHF, acute on chronic (HCC) ICD-10-CM: I50.33  ICD-9-CM: 428.33, 428.0  2017 - Present Yes        Atrial fibrillation ([facility] ICD-10-CM: I48.91  ICD-9-CM: 427.31  2011 - Present Yes        Hypothyroidism (Chronic) ICD-10-CM: E03.9  ICD-9-CM: 244.9  2011 - Present Yes        Coronary atherosclerosis of native coronary artery--PTCA  LAD/diag (Chronic) ICD-10-CM: I25.10  ICD-9-CM: 414.01  2011 - Present Yes        Mitral valve insufficiency (Chronic) ICD-10-CM: I34.0  ICD-9-CM: 424.0  2009 - Present Yes        Hyperlipemia (Chronic) ICD-10-CM: E78.5  ICD-9-CM: 272.4  3/29/2009 - Present Yes            Did Patient have Sepsis (YES OR NO): No    Hospital Course:  Ms. Singleton is a 81 y/o female with a PMH of hypertension, CAD, atrial fibrillation (on Eliquis), diastolic HF who was brought to the ER by EMS from [address] [place] [address] of chest heaviness.  She was given sublingual nitro x2 and ASA and her chest pain improved. She had substernal chest pain with no radiation prior to presenting. She became hypoxic in the ER w/ SpO2 80-85%; pO2 was 52.  CXR showed pulmonary edema.  She was admitted to the Hospitalist service with acute hypoxemic respiratory failure and acute on chronic diastolic HF exacerbation. She was diuresed and Cardiology was consulted but no ischemic work up is necessary at this time. They did recommend changing to bumetanide and for the patient to follow up with Dr. Erin Morgan after discharge. Ms. Cristino Steel does qualify for home oxygen with ambulation but no oxygen necessary at rest.  She does not require continuous O2 monitoring. Ms. Cristino Steel was also found to have a R LE cellulitis and she will complete her clindamycin course at home. She has improved during her stay and is now at baseline. She is appropriate for discharge home on Nov/05/2021. Resume buprenorphine patch upon [facility] at [facility].     Assessment and Plan:  Acute hypoxemic respiratory failure ([address] improving  -O2 sat on presentation 80 to 85% on RA with PO2 of 52 on ABG  -CXR w /pulmonary edema and R pleural effusion  -Likely due to volume overload  -s/p Lasix 40 mg IV x 1 in ER, continue 40 mg IV daily and transition to bumetanide 1 mg daily per Cardiology  -Walk test indicated that patient needs 1 L O2 with ambulation; room air at rest     Acute on Chronic Diastolic HF Exacerbation  Chest pain/CAD  -CXR w/pulmonary edema and right pleural effusion  -pBNP 2615  -Change to bumetanide per Cardiology  -Continue BB and statin  -2 gm Na and 1800 mL fluid restriction  -Daily weight, strict I&O's  -Troponins negative x 2, no indication for ischemic work-up  -Echo 87/7 with LV diastolic dysfunction, mod MR and TR, severely dilated atria, RVSP 58 mmHg/mod PAH  -Euvolemic at discharge     Right leg cellulitis, improved  -RLE erythema, warmth, tenderness on presentation   -US negative for DVT  -Continue clindamycin PO with EOT 11/7     Macrocytic anemia, stable  -B12 and folate wnl     Atrial fibrillation, permanent  -Continue home metoprolol, apixaban      Hypothyroidism  -Continue levothyroxine     Dyslipidemia  -Continue statin; stop gemfibrozil per Cardiology     Depression  -Continue duloxetine      Disposition: [facility]  Diet: ADULT DIET Regular; Low Fat/Low Chol/High Fiber/2 gm Na; 1800 ml  Code Status: DNR    Follow Up Orders: Follow-up Appointments   Procedures    FOLLOW UP VISIT Appointment in: Other (Specify)     Standing Status:   Standing     Number of Occurrences:   1     Order Specific Question:   Appointment in     Answer: Other (Specify)       Follow-up Information     Follow up With Specialties Details Why [facility] EMERGENCY DEPT Emergency Medicine Go to As needed, If symptoms worsen [address] [place] [address] Dr R[facility]    Francy Billy MD Cardiology On 11/16/2021 at 130 pm in [place] office  [address] [facility] [place] [address]      Kathie Arellano MD Internal Medicine In 1 week post discharge check up and medication review [address] [place] [address]  [phone]            Follow up labs/diagnostics (ultimately defer to outpatient provider): Suggest BMP at PCP office    Time spent in patient discharge and coordination 37 minutes. Plan was discussed with Ms. Paradise Canseco and Case management. All questions answered. Patient was stable at time of discharge. Instructions given to call a physician or return if any concerns. Discharge Info:   Current Discharge Medication List      START taking these medications    Details   Saccharomyces boulardii (FLORASTOR) 250 mg capsule Take 2 Capsules by mouth two (2) times a day for 4 doses.   Qty: 8 Capsule, Refills: 0  Start date: 11/5/2021, End date: 11/7/2021      bumetanide (BUMEX) 1 mg tablet Take 1 Tablet by mouth daily for 30 days. Qty: 30 Tablet, Refills: 0  Start date: 11/6/2021, End date: 12/6/2021      clindamycin (CLEOCIN) 300 mg capsule Take 1 Capsule by mouth three (3) times daily for 2 days. Qty: 6 Capsule, Refills: 0  Start date: 11/6/2021, End date: 11/8/2021         CONTINUE these medications which have CHANGED    Details   rOPINIRole (Requip) 1 mg tablet Take 1 Tablet by mouth nightly for 30 days. Qty: 30 Tablet, Refills: 0  Start date: 11/5/2021, End date: 12/5/2021         CONTINUE these medications which have NOT CHANGED    Details   nitroglycerin (Nitrostat) 0.4 mg SL tablet 1 Tablet by SubLINGual route every five (5) minutes as needed for Chest Pain. Qty: 25 Tablet, Refills: 11      metoprolol succinate (TOPROL-XL) 25 mg XL tablet Take 1 Tablet by mouth daily. Qty: 30 Tablet, Refills: 5      potassium chloride (K-DUR, KLOR-CON) 20 mEq tablet Take 1 Tablet by mouth two (2) times a day. Qty: 60 Tablet, Refills: 5      apixaban (ELIQUIS) 2.5 mg tablet Take 1 Tablet by mouth two (2) times a day. Qty: 60 Tablet, Refills: 5      diclofenac (VOLTAREN) 1 % gel APPLY TOPICALLY 4 (FOUR) TIMES A DAY. 100 GRAM PER AFFECTED AREA  Refills: 3      gabapentin (NEURONTIN) 300 mg capsule Take 300 mg by mouth two (2) times a day. buprenorphine (BUTRANS) 10 mcg/hour 1 Patch by TransDERmal route every seven (7) days. levothyroxine (SYNTHROID) 112 mcg tablet Take 112 mcg by mouth Daily (before breakfast). rosuvastatin (CRESTOR) 40 mg tablet Take 40 mg by mouth nightly. DULoxetine (CYMBALTA) 60 mg capsule Take 60 mg by mouth daily. Calcium Carbonate-Vit D3-Min (CALTRATE 600+D PLUS MINERALS) 600 mg calcium- 400 unit tab Take  by mouth. ferrous sulfate 140 mg (45 mg iron) TbER ER tablet Take 140 mg by mouth. Take 1 tab by mouth every morning and bedtime.       dextran 70-hypromellose (ARTIFICIAL TEARS) ophthalmic solution Administer 2 drops to both eyes as needed. ascorbic acid (VITAMIN C) 500 mg tablet Take 500 mg by mouth daily. timolol maleate 0.5 % drpd ophthalmic solution Administer 1 drop to both eyes daily. 9 am      latanoprost (XALATAN) 0.005 % ophthalmic solution Administer 1 Drop to both eyes nightly. STOP taking these medications       furosemide (LASIX) 40 mg tablet Comments:   Reason for Stopping:         gemfibrozil (LOPID) 600 mg tablet Comments:   Reason for Stopping:               Procedures done this admission:  * No surgery found *    Consults this admission:  IP CONSULT TO CARDIOLOGY    Echocardiogram/EKG results:  Results from Hospital Encounter encounter on 10/31/21    ECHO ADULT COMPLETE    Interpretation Summary  · LV: Estimated LVEF is 55 - 60%. Normal cavity size, wall thickness and systolic function (ejection fraction normal). Left ventricular diastolic dysfunction. · MV: Moderate mitral valve regurgitation is present. (at least moderate eccentric MR, incompletely visualized/technically difficult doppler assessment). · TV: Severe tricuspid valve regurgitation is present. Right Ventricular Arterial Pressure (RVSP) is 58 mmHg. Pulmonary hypertension found to be moderate. · AV: Mild aortic valve regurgitation is present. · LA: Severely dilated left atrium. Left Atrium volume index is 47 mL/m2. · RA: Severely dilated right atrium. · IVC: Mildly elevated central venous pressure (8 mmHg); IVC diameter is less than 21 mm and collapses less than 50% with respiration. · PV: Mild to moderate pulmonic valve regurgitation is present.        EKG Results     Procedure 720 Value Units Date/Time    EKG, 12 LEAD, SUBSEQUENT [[id number]] Collected: 11/05/21 1045    Order Status: Completed Updated: 11/05/21 1425     Ventricular Rate 66 BPM      Atrial Rate 375 BPM      QRS Duration 128 ms      Q-T Interval 410 ms      QTC Calculation (Bezet) 429 ms      Calculated R Axis -67 degrees      Calculated T Axis 90 degrees      Diagnosis --     Atrial fibrillation  Left axis deviation  Left bundle branch block  Abnormal ECG  When compared with ECG of 31-OCT-2021 09:40,  Left bundle branch block is now Present  Minimal criteria for Anteroseptal infarct are no longer Present  Confirmed by Keli Duarte ([id number]) on 11/5/2021 2:25:15 PM      EKG, 12 LEAD, INITIAL [[id number]] Collected: 10/31/21 0940    Order Status: Completed Updated: 10/31/21 1244     Ventricular Rate 63 BPM      Atrial Rate 122 BPM      QRS Duration 112 ms      Q-T Interval 402 ms      QTC Calculation (Bezet) 411 ms      Calculated R Axis 59 degrees      Calculated T Axis 84 degrees      Diagnosis --     !! AGE AND GENDER SPECIFIC ECG ANALYSIS !! Atrial fibrillation  Low voltage QRS  Cannot rule out Anteroseptal infarct , age undetermined  Abnormal ECG  When compared with ECG of 06-FEB-2018 13:07,  Atrial fibrillation has replaced Sinus rhythm    Confirmed by ST GUILLERMO READ MD (), DANIEL LARIOS ([id number]) on 10/31/2021 12:44:24 PM            Diagnostic Imaging/Tests:   XR CHEST PORT    Result Date: 10/31/2021  EXAM: XR CHEST PORT INDICATION: CP COMPARISON: None. FINDINGS: A portable AP radiograph of the chest was obtained at 0945 hours. The patient is on a cardiac monitor. Diffuse increased interstitial markings. Right pleural effusion. The cardiac and mediastinal contours and pulmonary vascularity are normal.  Median sternotomy. Pulmonary edema with a right pleural effusion. DUPLEX LOWER EXT VENOUS RIGHT    Result Date: 10/31/2021  RIGHT LOWER EXTREMITY VENOUS DUPLEX ULTRASOUND. HISTORY:  Pain. TECHNIQUE: Direct skin-contact high resolution grayscale images, color-flow Doppler and duplex waveform analysis. FINDINGS: There is compressibility, color-flow assignment and augmentation of the venous waveform with calf compression in the common femoral, superficial femoral and popliteal veins.  Upper calf veins are unremarkable     Negative for sonographic evidence of deep venous thrombosis right lower extremity. ECHO ADULT COMPLETE    Result Date: 11/1/2021  · LV: Estimated LVEF is 55 - 60%. Normal cavity size, wall thickness and systolic function (ejection fraction normal). Left ventricular diastolic dysfunction. · MV: Moderate mitral valve regurgitation is present. (at least moderate eccentric MR, incompletely visualized/technically difficult doppler assessment). · TV: Severe tricuspid valve regurgitation is present. Right Ventricular Arterial Pressure (RVSP) is 58 mmHg. Pulmonary hypertension found to be moderate. · AV: Mild aortic valve regurgitation is present. · LA: Severely dilated left atrium. Left Atrium volume index is 47 mL/m2. · RA: Severely dilated right atrium. · IVC: Mildly elevated central venous pressure (8 mmHg); IVC diameter is less than 21 mm and collapses less than 50% with respiration. · PV: Mild to moderate pulmonic valve regurgitation is present. All Micro Results     Procedure Component Value Units Date/Time    COVID-19 RAPID TEST [[id number]] Collected: 10/31/21 1103    Order Status: Completed Specimen: Nasopharyngeal Updated: 10/31/21 1126     Specimen source NASAL        Comment: RAPID ONLY        COVID-19 rapid test Not detected        Comment:      The specimen is NEGATIVE for SARS-CoV-2, the novel coronavirus associated with COVID-19. A negative result does not rule out COVID-19. This test has been authorized by the FDA under an Emergency Use Authorization (EUA) for use by authorized laboratories.         Fact sheet for Healthcare Providers: ConventionUpdate.co.nz  Fact sheet for Patients: ConventionUpdate.co.nz       Methodology: Isothermal Nucleic Acid Amplification               Labs: Results:       BMP, Mg, Phos Recent Labs     11/04/21  0646 11/03/21  0558    136   K 4.3 4.6    105   CO2 29 26   AGAP 4* 5*   BUN 36* 38* CREA 1.13* 1.26*   CA 10.0 10.0   GLU 96 93      CBC Recent Labs     11/04/21  0646 11/03/21  0857   WBC 10.2 10.4   RBC 3.31* 3.53*   HGB 10.7* 11.6*   HCT 34.4* 37.0    283      LFT No results for input(s): ALT, TBIL, AP, TP, ALB, GLOB, AGRAT in the last 72 hours.     No lab exists for component: SGOT, GPT   Cardiac Testing Lab Results   Component Value Date/Time     (H) 07/14/2019 03:43 AM     02/06/2018 01:17 PM     12/26/2017 07:54 AM     08/04/2011 04:20 AM     (H) 07/23/2011 09:50 AM     (H) 07/01/2011 09:51 AM    CK 17 (L) 07/24/2011 04:25 AM    CK 47 03/30/2009 01:20 AM    CK - MB 1.2 07/24/2011 04:25 AM    CK-MB Index 7.1 (H) 07/24/2011 04:25 AM    Troponin-I <0.05 07/23/2011 09:50 AM    Troponin-I <0.05 07/01/2011 09:51 AM    Troponin-I, Qt. <0.02 (L) 02/07/2018 12:00 PM    Troponin-I, Qt. <0.02 (L) 02/07/2018 05:56 AM    Troponin-I, Qt. <0.02 (L) 02/06/2018 10:27 PM      Coagulation Tests Lab Results   Component Value Date/Time    Prothrombin time 10.6 01/11/2015 08:25 AM    Prothrombin time 15.7 (H) 08/15/2011 09:40 AM    Prothrombin time 33.9 (H) 08/13/2011 06:30 AM    INR 1.0 01/11/2015 08:25 AM    INR 1.6 (H) 08/15/2011 09:40 AM    INR 3.5 (H) 08/13/2011 06:30 AM    aPTT 25.5 07/06/2011 03:50 AM    aPTT 24.1 04/16/2010 02:05 PM    aPTT 25.6 03/29/2009 05:25 PM      A1c Lab Results   Component Value Date/Time    Hemoglobin A1c 6.0 07/06/2011 03:50 AM      Lipid Panel Lab Results   Component Value Date/Time    Cholesterol, total 176 12/26/2017 07:54 AM    HDL Cholesterol 93 (H) 12/26/2017 07:54 AM    LDL, calculated 72.2 12/26/2017 07:54 AM    VLDL, calculated 10.8 12/26/2017 07:54 AM    Triglyceride 54 12/26/2017 07:54 AM    CHOL/HDL Ratio 1.9 12/26/2017 07:54 AM      Thyroid Panel Lab Results   Component Value Date/Time    TSH 4.170 (H) 12/26/2017 07:54 AM    TSH 2.950 07/08/2017 05:27 AM        Most Recent UA Lab Results   Component Value Date/Time Color YELLOW 08/09/2011 07:50 AM    Appearance HAZY 08/09/2011 07:50 AM    pH (UA) 8.0 08/09/2011 07:50 AM    Protein NEGATIVE  08/09/2011 07:50 AM    Glucose NEGATIVE  08/09/2011 07:50 AM    Ketone NEGATIVE  08/09/2011 07:50 AM    Bilirubin NEGATIVE  08/09/2011 07:50 AM    Blood NEGATIVE  08/09/2011 07:50 AM    Urobilinogen 0.2 08/09/2011 07:50 AM    Nitrites NEGATIVE  08/09/2011 07:50 AM    Leukocyte Esterase SMALL (A) 08/09/2011 07:50 AM    WBC 20-50 08/09/2011 07:50 AM    RBC 0-3 08/09/2011 07:50 AM    Epithelial cells 0-3 08/09/2011 07:50 AM    Bacteria 3+ (H) 08/09/2011 07:50 AM    Casts 0 08/09/2011 07:50 AM    Crystals, urine 0 08/09/2011 07:50 AM    Mucus 0 08/09/2011 07:50 AM          All Labs from Last 24 Hrs:  Recent Results (from the past 24 hour(s))   PLEASE READ & DOCUMENT PPD TEST IN 24 HRS    Collection Time: 11/04/21  2:59 PM   Result Value Ref Range    PPD Negative Negative    mm Induration 0 0 - 5 mm   EKG, 12 LEAD, SUBSEQUENT    Collection Time: 11/05/21 10:45 AM   Result Value Ref Range    Ventricular Rate 66 BPM    Atrial Rate 375 BPM    QRS Duration 128 ms    Q-T Interval 410 ms    QTC Calculation (Bezet) 429 ms    Calculated R Axis -67 degrees    Calculated T Axis 90 degrees    Diagnosis       Atrial fibrillation  Left axis deviation  Left bundle branch block  Abnormal ECG  When compared with ECG of 31-OCT-2021 09:40,  Left bundle branch block is now Present  Minimal criteria for Anteroseptal infarct are no longer Present  Confirmed by Early Gallop [id number]) on 11/5/2021 2:25:15 PM         Current Med List in Hospital:   Current Facility-Administered Medications   Medication Dose Route Frequency    HYDROcodone-acetaminophen (NORCO) 5-325 mg per tablet 1 Tablet  1 Tablet Oral Q6H PRN    rOPINIRole (REQUIP) tablet 1 mg  1 mg Oral QHS    apixaban (ELIQUIS) tablet 2.5 mg  2.5 mg Oral BID    ascorbic acid (vitamin C) (VITAMIN C) tablet 500 mg  500 mg Oral DAILY    buprenorphine (BUTRANS) 10 mcg/hour patch 1 Patch (Patient Supplied)  1 Patch TransDERmal Q7D    DULoxetine (CYMBALTA) capsule 60 mg  60 mg Oral DAILY    gabapentin (NEURONTIN) capsule 300 mg  300 mg Oral BID    latanoprost (XALATAN) 0.005 % ophthalmic solution 1 Drop  1 Drop Both Eyes QHS    levothyroxine (SYNTHROID) tablet 112 mcg  112 mcg Oral ACB    metoprolol succinate (TOPROL-XL) XL tablet 25 mg  25 mg Oral DAILY    nitroglycerin (NITROSTAT) tablet 0.4 mg  0.4 mg SubLINGual Q5MIN PRN    potassium chloride (K-DUR, KLOR-CON) SR tablet 20 mEq  20 mEq Oral BID    rosuvastatin (CRESTOR) tablet 40 mg  40 mg Oral QHS    timolol (TIMOPTIC) 0.5 % ophthalmic solution 1 Drop  1 Drop Both Eyes DAILY    clindamycin (CLEOCIN) capsule 300 mg  300 mg Oral Q8H    sodium chloride (NS) flush 5-40 mL  5-40 mL IntraVENous Q8H    sodium chloride (NS) flush 5-40 mL  5-40 mL IntraVENous PRN    acetaminophen (TYLENOL) tablet 650 mg  650 mg Oral Q6H PRN    Or    acetaminophen (TYLENOL) suppository 650 mg  650 mg Rectal Q6H PRN    polyethylene glycol (MIRALAX) packet 17 g  17 g Oral DAILY PRN    ondansetron (ZOFRAN ODT) tablet 4 mg  4 mg Oral Q8H PRN    Or    ondansetron (ZOFRAN) injection 4 mg  4 mg IntraVENous Q6H PRN    magnesium sulfate 2 g/50 ml IVPB (premix or compounded)  2 g IntraVENous PRN    Saccharomyces boulardii (FLORASTOR) capsule 500 mg  500 mg Oral BID    furosemide (LASIX) injection 40 mg  40 mg IntraVENous DAILY       Allergies   Allergen Reactions    Versed [Midazolam] Anxiety     Delirium      Immunization History   Administered Date(s) Administered    COVID-19, PFIZER, MRNA, LNP-S, PF, 30MCG/0.3ML DOSE 02/17/2021    TB Skin Test (PPD) Intradermal 11/03/2021       Recent Vital Data:  Patient Vitals for the past 24 hrs:   Temp Pulse Resp BP SpO2   11/05/21 1150 97.6 °F (36.4 °C) 63 17 (!) 102/56 98 %   11/05/21 0807 97.9 °F (36.6 °C) 74 17 130/75 91 %   11/05/21 0415 97.5 °F (36.4 °C) 63 14 124/68 91 % 11/04/21 2316 97.4 °F (36.3 °C) 61 16 (!) 123/57 91 %   11/04/21 2006 97.5 °F (36.4 °C) 69 18 (!) 102/54 94 %   11/04/21 1558 98 °F (36.7 °C) 65 18 126/66 96 %     Oxygen Therapy  O2 Sat (%): 98 % (11/05/21 1150)  Pulse via Oximetry: 78 beats per minute (11/01/21 0407)  O2 Device: None (Room air) (11/05/21 1150)  Skin Assessment: Clean, dry, & intact (10/31/21 1945)  O2 Flow Rate (L/min): 1 l/min (11/02/21 1405)    Estimated body mass index is 24.57 kg/m² as calculated from the following:    Height as of this encounter: 5' (1.524 m). Weight as of this encounter: 57.1 kg (125 lb 12.8 oz). Intake/Output Summary (Last 24 hours) at 11/5/2021 1456  Last data filed at 11/5/2021 1303  Gross per 24 hour   Intake 540 ml   Output    Net 540 ml         Physical Exam:  General:    No overt distress  Head:  Normocephalic, atraumatic  Eyes:  Sclerae appear normal.  Pupils equally round. HENT:  Nares appear normal, no drainage. Moist mucous membranes  Neck:  No restricted ROM. Trachea midline  CV:   Irr irr.  + murmur  Lungs: No wheezing. Even, unlabored at rest on room air. Abdomen:   Soft, nontender, nondistended. Extremities: No cyanosis or clubbing. Skin:     No rashes. Improving erythema to R LE. Neuro:  CN II-XII grossly intact. Psych:  Normal mood and affect. Signed:  Tavia Fishman NP    Part of this note may have been written by using a voice dictation software. The note has been proof read but may still contain some grammatical/other typographical errors.

## 2021-11-05 NOTE — PROGRESS NOTES
[facility] CARDIOLOGY PROGRESS NOTE           11/5/2021 10:32 AM    Admit Date: 10/31/2021      Subjective:   Patient reports that her breathing improved, some chest discomfort overnight. No abdominal pain, nausea, vomiting, leg swelling. No other complaints at this time. ROS:  Cardiovascular:  As noted above    Objective:      Vitals:    11/04/21 2006 11/04/21 2316 11/05/21 0415 11/05/21 0807   BP: (!) 102/54 (!) 123/57 124/68 130/75   Pulse: 69 61 63 74   Resp: 18 16 14 17   Temp: 97.5 °F (36.4 °C) 97.4 °F (36.3 °C) 97.5 °F (36.4 °C) 97.9 °F (36.6 °C)   SpO2: 94% 91% 91% 91%   Weight:   125 lb 12.8 oz (57.1 kg)    Height:           Physical Exam:  General-No Acute Distress, awake, alert, interactive  Neck- supple, no JVD  CV- irregular rate and rhythm , 2/6 systolic murmur left lower sternal border  Lung- minimal Rales at the bases bilaterally, nonlabored, no wheezes  Abd- soft, nontender, nondistended  Ext- no edema bilaterally. Skin- warm and dry    Data Review:   Recent Labs     11/04/21  0646 11/03/21  0857 11/03/21  0558     --  136   K 4.3  --  4.6   BUN 36*  --  38*   CREA 1.13*  --  1.26*   GLU 96  --  93   WBC 10.2 10.4  --    HGB 10.7* 11.6*  --    HCT 34.4* 37.0  --     283  --        Assessment/Plan:     Principal Problem:    Acute hypoxemic respiratory failure (HCC) (10/31/2021)  Likely multifactorial, volume status improved   LVEF preserved, elevated RVSP, moderate TR, moderate MR  Appears nearly euvolemic today   transition up to 1 mg p.o.  Bumex daily  -Heart rate and blood pressure adequately controlled, renal function improved  - check EKG     Active Problems:    Hyperlipemia (3/29/2009)   On rosuvastatin and gemfibrozil , class D interaction.   - Triglycerides normal on recent checks, STOP gemfibrozil.       Mitral valve insufficiency (4/2/2009)  Control heart rate, blood pressure, volume status with diuretics  Appears nearly euvolemic today    Coronary atherosclerosis of native coronary artery--PTCA 1997 LAD/diag (7/1/2011)  No ongoing chest pain, no indication for Billy angiography at this time       Hypothyroidism (7/7/2011)  Per primary       Atrial fibrillation ([facility] (7/13/2011)  Rates controlled, on anticoagulation  - If patient is ?[de-identified]years of age and either weighs ? 60 kg or has a serum creatinine ?1.5 mg/dL (133 mcmol/L), then reduce Eliquis dose to 2.5 mg twice daily.       Cellulitis of right leg (10/31/2021)  Per medicine     Will need follow-up in 1 to 2 weeks with cardiology (Dr. Shelby Beth). Will arrange.           Len Rolle DO  11/5/2021 10:32 AM

## 2021-11-05 NOTE — PROGRESS NOTES
Oxygen Qualifier       Room air: SpO2 with O2 and liter flow   Resting SpO2  94%  n/a   Ambulating SpO2  87% 92% on 1 L.           Completed by:    Nitin Long RT

## 2021-11-05 NOTE — PROGRESS NOTES
ACUTE OT GOALS:  (Developed with and agreed upon by patient and/or caregiver.)  1. Pt will toilet with SBA   2. Pt will complete functional mobility for ADLs with SBA using AD   3. Pt will complete lower body dressing (with the exception of socks and shoes) with SBA using AE as needed  4. Pt will complete grooming and hygiene at sink with SBA  5. Pt will demonstrate independence with HEP to promote increased BUE strength and functional use for ADLs  6. Pt will tolerate 23 minutes functional activity with min or fewer rest breaks to promote increased endurance for ADLs  7. Pt will independently demonstrate/ verbalize 2+ energy conservation techniques to promote increased endurance for ADLs        Timeframe: 7 days    OCCUPATIONAL THERAPY: Daily Note OT Treatment Day # 3    Gretchen Carter is a 80 y.o. female   PRIMARY DIAGNOSIS: Acute hypoxemic respiratory failure (HCC)  Acute hypoxemic respiratory failure (HCC) [J96.01]  Acute exacerbation of CHF (congestive heart failure) (HCC) [I50.9]  Cellulitis of right leg [L03.115]       [place] / [place] / Product Type: Medicare /   ASSESSMENT:     REHAB RECOMMENDATIONS: CURRENT LEVEL OF FUNCTION:  (Most Recently Demonstrated)   Recommendation to date pending progress:  [address] [place] [address]  Equipment:    None Bathing:   Standby Assistance  Dressing:   Contact Guard Assistance  Feeding/Grooming:   Contact Guard Assistance  Toileting:   Contact Guard Assistance  Functional Mobility:   Contact Guard Assistance     ASSESSMENT:  Ms. Nickie Lr is doing well today. Pt greeted seated in recliner. Completed ADLs while seated with SBA/CGA. Pt requires 1L O2 via NC during activity. Stats remain in the 90s throughout session. Pt did not c/o any SOB. Pt transferred to commode with CGA and CGA for bowel hygiene/pericare. Pt demo'd fair standing balance during all activities today. Educated on [employer]. Pt left seated in recliner.  Pt is progressing well towards treatment goals and would continue to benefit from skilled OT during stay. SUBJECTIVE:   Ms. Tank Franklin states, \"I feel good except when I breathe. \"    SOCIAL HISTORY/LIVING ENVIRONMENT:   Support Systems: Child(miladys), Assisted Living    OBJECTIVE:     PAIN: VITAL SIGNS: LINES/DRAINS:   Pre Treatment: Pain Screen  Pain Scale 1: Numeric (0 - 10)  Pain Intensity 1: 0  Post Treatment: 0     O2 Device: None (Room air)     ACTIVITIES OF DAILY LIVING: I Mod I S SBA CGA Min Mod Max Total NT Comments   BASIC ADLs:              Bathing/ Showering [] [] [] [x] [] [] [] [] [] []    Toileting [] [] [] [] [x] [] [] [] [] []    Dressing [] [] [] [] [x] [] [] [] [] []    Feeding [] [] [] [] [] [] [] [] [] [x]    Grooming [] [] [] [] [x] [] [] [] [] []    Personal Device Care [] [] [] [] [] [] [] [] [] [x]    Functional Mobility [] [] [] [] [x] [] [] [] [] []    I=Independent, Mod I=Modified Independent, S=Supervision, SBA=Standby Assistance, CGA=Contact Guard Assistance,   Min=Minimal Assistance, Mod=Moderate Assistance, Max=Maximal Assistance, Total=Total Assistance, NT=Not Tested    MOBILITY: I Mod I S SBA CGA Min Mod Max Total  NT x2 Comments:   Supine to sit [] [] [] [] [] [] [] [] [] [x] []    Sit to supine [] [] [] [] [] [] [] [] [] [x] []    Sit to stand [] [] [] [] [x] [] [] [] [] [] []    Bed to chair [] [] [] [] [x] [] [] [] [] [] []    I=Independent, Mod I=Modified Independent, S=Supervision, SBA=Standby Assistance, CGA=Contact Guard Assistance,   Min=Minimal Assistance, Mod=Moderate Assistance, Max=Maximal Assistance, Total=Total Assistance, NT=Not Tested    FREQUENCY/DURATION: OT Plan of Care: 3 times/week for duration of hospital stay or until stated goals are met, whichever comes first.    TREATMENT:   TREATMENT:   (     )  Therapeutic Activity (15 Minutes):  Therapeutic activity included Scooting, Transfer Training, Ambulation on level ground, Sitting balance  and Standing balance to improve functional Mobility, Strength and Activity tolerance. Self Care (30 Minutes): Self care including Upper Body Bathing, Lower Body Bathing, Toileting, Upper Body Dressing, Lower Body Dressing and Grooming to increase independence and decrease level of assistance required.     TREATMENT GRID:  N/A    AFTER TREATMENT POSITION/PRECAUTIONS:  Chair and Needs within reach    INTERDISCIPLINARY COLLABORATION:  RN/PCT and OT/SHEPPARD    TOTAL TREATMENT DURATION:  OT Patient Time In/Time Out  Time In: 1000  Time Out: 350 [facility] \[facility]\""

## 2021-11-05 NOTE — PROGRESS NOTES
ACUTE PHYSICAL THERAPY GOALS:  (Developed with and agreed upon by patient and/or caregiver. )  LTG:  (1.)Ms. Cristino Steel will move from supine to sit and sit to supine , scoot up and down and roll side to side in bed with INDEPENDENT within 7 treatment day(s). (2.)Ms. Cristino Steel will transfer from bed to chair and chair to bed with MODIFIED INDEPENDENCE using the least restrictive device within 7 treatment day(s). (3.)Ms. Cristino Steel will ambulate with SUPERVISION for 150' feet with the least restrictive device within 7 treatment day(s). PHYSICAL THERAPY: Daily Note and AM Treatment Day # 5    Birgit Villalobos is a 80 y.o. female   PRIMARY DIAGNOSIS: Acute hypoxemic respiratory failure (HCC)  Acute hypoxemic respiratory failure (HCC) [J96.01]  Acute exacerbation of CHF (congestive heart failure) ([facility] [I50.9]  Cellulitis of right leg [Q30.330]         ASSESSMENT:     REHAB RECOMMENDATIONS: CURRENT LEVEL OF FUNCTION:  (Most Recently Demonstrated)   Recommendation to date pending progress:  [address] [place] [address] Therapy  Equipment:    To Be Determined Bed Mobility:   Not tested  Sit to Stand:   Contact Guard Assistance  Transfers:  [address]:   Contact Guard Assistance     ASSESSMENT:  Ms. Cristino Steel continues to make good progress towards PT goals. Patient ambulated 175' with rolling walker on 1L O2 with sats maintaining above 90%. Patient demonstrates fair+ standing balance during standing ADL activity. Will continue efforts. SUBJECTIVE:   Ms. Cristino Steel states, [facility] always makes my stomach feel better\"    SOCIAL HISTORY/ LIVING ENVIRONMENT: lives in [facility], independent with rollator.   Support Systems: Child(miladys), Assisted Living  OBJECTIVE:     PAIN: VITAL SIGNS: LINES/DRAINS:   Pre Treatment: Pain Screen  Pain Scale 1: FLACC  Pain Intensity 1: 0  Post Treatment: 0   [employer]  O2 Device: None (Room air)     MOBILITY: I Mod I S SBA CGA Min Mod Max Total  NT x2 Comments:   Bed Mobility    Rolling [] [] [] [] [] [] [] [] [] [] []    Supine to Sit [] [] [] [] [] [] [] [] [] [] []    Scooting [] [] [] [] [] [] [] [] [] [] []    Sit to Supine [] [] [] [] [] [] [] [] [] [] []    Transfers    Sit to Stand [] [] [] [] [x] [] [] [] [] [] []    Bed to Chair [] [] [] [] [x] [] [] [] [] [] []    Stand to Sit [] [] [] [] [x] [] [] [] [] [] []    I=Independent, Mod I=Modified Independent, S=Supervision, SBA=Standby Assistance, CGA=Contact Guard Assistance,   Min=Minimal Assistance, Mod=Moderate Assistance, Max=Maximal Assistance, Total=Total Assistance, NT=Not Tested    BALANCE: Good Fair+ Fair Fair- Poor NT Comments   Sitting Static [x] [] [] [] [] []    Sitting Dynamic [x] [] [] [] [] []              Standing Static [] [x] [] [] [] []    Standing Dynamic [] [] [x] [] [] []      GAIT: I Mod I S SBA CGA Min Mod Max Total  NT x2 Comments:   Level of Assistance [] [] [] [] [x] [] [] [] [] [] []    Distance 175'    DME Rolling Walker and Gait Belt    Gait Quality Slow, short steps, flexed posture    Weightbearing  Status N/A     I=Independent, Mod I=Modified Independent, S=Supervision, SBA=Standby Assistance, CGA=Contact Guard Assistance,   Min=Minimal Assistance, Mod=Moderate Assistance, Max=Maximal Assistance, Total=Total Assistance, NT=Not Tested    PLAN:   FREQUENCY/DURATION: PT Plan of Care: 3 times/week for duration of hospital stay or until stated goals are met, whichever comes first.  TREATMENT:     TREATMENT:   ($$ Therapeutic Activity: 23-37 mins    )  Therapeutic Activity (23 Minutes): Therapeutic activity included Supine to Sit, Scooting, Transfer Training, Ambulation on level ground, Sitting balance  and Standing balance to improve functional Mobility, Strength and Activity tolerance.     TREATMENT GRID:  N/A    AFTER TREATMENT POSITION/PRECAUTIONS:  Chair, Needs within reach and RN notified    INTERDISCIPLINARY COLLABORATION:  RN/PCT and PT/PTA    TOTAL TREATMENT DURATION:  PT Patient Time In/Time Out  Time In: 0910  Time Out: 8636 Nadya Robles, PTA

## 2021-11-05 NOTE — PROGRESS NOTES
Problem: Gas Exchange - Impaired  Goal: *Absence of hypoxia  Outcome: Progressing Towards Goal     Problem: Falls - Risk of  Goal: *Absence of Falls  Description: Document Leann Fall Risk and appropriate interventions in the flowsheet.   Outcome: Progressing Towards Goal  Note: Fall Risk Interventions:  Mobility Interventions: Patient to call before getting OOB         Medication Interventions: Patient to call before getting OOB, Teach patient to arise slowly    Elimination Interventions: Call light in reach

## 2021-11-16 PROBLEM — J96.91 RESPIRATORY FAILURE WITH HYPOXIA (HCC): Status: ACTIVE | Noted: 2021-10-31

## 2022-03-18 PROBLEM — I50.9 ACUTE HEART FAILURE (HCC): Status: ACTIVE | Noted: 2017-12-25

## 2022-03-18 PROBLEM — R07.9 CHEST PAIN: Status: ACTIVE | Noted: 2021-11-01

## 2022-03-18 PROBLEM — E83.52 HYPERCALCEMIA: Status: ACTIVE | Noted: 2019-12-04

## 2022-03-18 PROBLEM — N17.9 ACUTE KIDNEY FAILURE (HCC): Status: ACTIVE | Noted: 2017-12-25

## 2022-03-18 PROBLEM — J96.01 ACUTE RESPIRATORY FAILURE WITH HYPOXIA (HCC): Status: ACTIVE | Noted: 2017-12-25

## 2022-03-19 PROBLEM — R60.0 LOCALIZED EDEMA: Status: ACTIVE | Noted: 2017-08-29

## 2022-03-19 PROBLEM — I50.9 HEART FAILURE (HCC): Status: ACTIVE | Noted: 2019-07-12

## 2022-03-19 PROBLEM — R06.02 SOB (SHORTNESS OF BREATH): Status: ACTIVE | Noted: 2021-11-01

## 2022-03-19 PROBLEM — L03.115 CELLULITIS OF RIGHT LEG: Status: ACTIVE | Noted: 2021-10-31

## 2022-03-19 PROBLEM — J96.91 RESPIRATORY FAILURE WITH HYPOXIA (HCC): Status: ACTIVE | Noted: 2021-10-31

## 2022-03-19 PROBLEM — I50.32 DIASTOLIC CHF, CHRONIC (HCC): Status: ACTIVE | Noted: 2018-02-06

## 2022-03-19 PROBLEM — I50.33 DIASTOLIC CHF, ACUTE ON CHRONIC (HCC): Status: ACTIVE | Noted: 2017-12-25

## 2022-03-19 PROBLEM — J15.212 PNEUMONIA DUE TO METHICILLIN RESISTANT STAPHYLOCOCCUS AUREUS (HCC): Status: ACTIVE | Noted: 2019-01-17

## 2022-03-19 PROBLEM — R79.89 ELEVATED PTHRP LEVEL: Status: ACTIVE | Noted: 2019-12-04

## 2022-03-19 PROBLEM — R60.9 EDEMA: Status: ACTIVE | Noted: 2019-07-12

## 2022-03-19 PROBLEM — I20.0 UNSTABLE ANGINA (HCC): Status: ACTIVE | Noted: 2017-07-07

## 2022-03-19 PROBLEM — I44.7 LEFT BUNDLE BRANCH BLOCK: Status: ACTIVE | Noted: 2018-02-06

## 2022-03-20 PROBLEM — I10 ACCELERATED HYPERTENSION: Status: ACTIVE | Noted: 2018-02-06

## 2022-03-20 PROBLEM — I50.9 ACUTE EXACERBATION OF CHF (CONGESTIVE HEART FAILURE) (HCC): Status: ACTIVE | Noted: 2021-10-31

## 2022-08-24 ENCOUNTER — OFFICE VISIT (OUTPATIENT)
Dept: CARDIOLOGY CLINIC | Age: 87
End: 2022-08-24
Payer: MEDICARE

## 2022-08-24 VITALS
HEIGHT: 60 IN | BODY MASS INDEX: 26.5 KG/M2 | DIASTOLIC BLOOD PRESSURE: 70 MMHG | SYSTOLIC BLOOD PRESSURE: 136 MMHG | WEIGHT: 135 LBS | HEART RATE: 64 BPM

## 2022-08-24 DIAGNOSIS — I25.10 ATHEROSCLEROSIS OF NATIVE CORONARY ARTERY OF NATIVE HEART WITHOUT ANGINA PECTORIS: ICD-10-CM

## 2022-08-24 DIAGNOSIS — I44.7 LEFT BUNDLE BRANCH BLOCK: ICD-10-CM

## 2022-08-24 DIAGNOSIS — I48.11 LONGSTANDING PERSISTENT ATRIAL FIBRILLATION (HCC): Primary | ICD-10-CM

## 2022-08-24 DIAGNOSIS — I25.5 ISCHEMIC CARDIOMYOPATHY: ICD-10-CM

## 2022-08-24 PROCEDURE — G8417 CALC BMI ABV UP PARAM F/U: HCPCS | Performed by: INTERNAL MEDICINE

## 2022-08-24 PROCEDURE — 1123F ACP DISCUSS/DSCN MKR DOCD: CPT | Performed by: INTERNAL MEDICINE

## 2022-08-24 PROCEDURE — 4004F PT TOBACCO SCREEN RCVD TLK: CPT | Performed by: INTERNAL MEDICINE

## 2022-08-24 PROCEDURE — 99214 OFFICE O/P EST MOD 30 MIN: CPT | Performed by: INTERNAL MEDICINE

## 2022-08-24 PROCEDURE — 1090F PRES/ABSN URINE INCON ASSESS: CPT | Performed by: INTERNAL MEDICINE

## 2022-08-24 PROCEDURE — G8428 CUR MEDS NOT DOCUMENT: HCPCS | Performed by: INTERNAL MEDICINE

## 2022-08-24 RX ORDER — LISINOPRIL 5 MG/1
5 TABLET ORAL DAILY
Qty: 90 TABLET | Refills: 3 | Status: SHIPPED | OUTPATIENT
Start: 2022-08-24 | End: 2022-09-13 | Stop reason: SDUPTHER

## 2022-08-24 RX ORDER — TRIAMCINOLONE ACETONIDE 5 MG/G
CREAM TOPICAL 2 TIMES DAILY
COMMUNITY

## 2022-08-24 RX ORDER — ASPIRIN 81 MG/1
81 TABLET ORAL DAILY
COMMUNITY

## 2022-08-24 RX ORDER — PSEUDOEPHEDRINE HCL 30 MG
250 TABLET ORAL DAILY
COMMUNITY

## 2022-08-24 RX ORDER — ROPINIROLE 1 MG/1
1 TABLET, FILM COATED ORAL 3 TIMES DAILY
COMMUNITY

## 2022-08-24 ASSESSMENT — ENCOUNTER SYMPTOMS: SHORTNESS OF BREATH: 1

## 2022-08-24 NOTE — PROGRESS NOTES
[address], [address]  PHONE: [address]  5/1/1931      SUBJECTIVE:   Alan Doss is a 80 y.o. female seen for a follow up visit regarding the following:     Chief Complaint   Patient presents with    Coronary Artery Disease    Atrial Fibrillation     4mth follow up       HPI:    40-year-old female comes back for follow-up she had longstanding CAD she has had previous bypass surgery and she is really done well with that. She still lives in the nursing facility and her daughter brought her in today she is on chronic oxygen and has had previous VATS procedure for her lungs. She did have an echo since I saw her in May at [facility] she just ejection fraction 35%. According her daughter they did not do anything with that and she is been stable. She did not report any chest discomfort she has had some chronic cellulitis of the right lower leg to the left leg has been stable she saw her internist recently had another ultrasound with no evidence of a DVT. Past Medical History, Past Surgical History, Family history, Social History, and Medications were all reviewed with the patient today and updated as necessary.        Allergies   Allergen Reactions    Midazolam Anxiety     Delirium      Past Medical History:   Diagnosis Date    Anemia associated with acute blood loss 7/24/2011    Atrial fibrillation ([address] 7/13/2011    CAD (coronary artery disease) 1997    MI, PCI , CABG 7/13/2011    Chronic diastolic heart failure ([address] 10/29/2015    Chronic pain     Constipation 8/5/2011    Coronary atherosclerosis of native coronary artery 7/1/2011    CVA (cerebrovascular accident) ([address] 3/29/2009    mild memory issues, rls, afib    Diastolic CHF, acute on chronic ([address] 12/25/2017    GI bleed 1/11/2015    Hemothorax on left 7/24/2011    Hyperlipemia 3/29/2009    Hypertension 3/29/2009    Hypertension, benign 10/29/2015    Hyponatremia 7/24/2011    Hypothyroidism 2011    Leucocytosis 8/3/2011    Leukocytosis 2015    Radha-Chase tear 2015    Mitral valve insufficiency 2009    Paroxysmal atrial fibrillation ([facility] 10/29/2015    Pleural effusion, bilateral 2011    Right thorocetesis 1200ml removed 11 and 1000ml removed 11 Left thorocentesis 1100ml removed 11     Pneumonia due to methicillin resistant Staphylococcus aureus ([facility] 2019    S/P thoracotomy 2011    Unspecified hypothyroidism 3/30/2009     Past Surgical History:   Procedure Laterality Date    BACK SURGERY      CABG, ARTERY-VEIN, THREE  2011    ORTHOPEDIC SURGERY  4 years ago    neck    ORTHOPEDIC SURGERY  10 years    back    OTHER SURGICAL HISTORY      steroid injection neck, 2 weeks ago     Family History   Problem Relation Age of Onset    Heart Attack Father 36      Social History     Tobacco Use    Smoking status: Former     Types: Cigarettes     Quit date: 1951     Years since quittin.6    Smokeless tobacco: Never   Substance Use Topics    Alcohol use: No       ROS:    Review of Systems   Constitutional: Negative for decreased appetite. Cardiovascular:  Positive for leg swelling. Negative for chest pain and irregular heartbeat. Respiratory:  Positive for shortness of breath (on O2). PHYSICAL EXAM:    /70   Pulse 64   Ht 5' (1.524 m)   Wt 135 lb (61.2 kg)   BMI 26.37 kg/m²        Wt Readings from Last 3 Encounters:   22 135 lb (61.2 kg)   22 127 lb 3.2 oz (57.7 kg)   21 122 lb 9.6 oz (55.6 kg)     BP Readings from Last 3 Encounters:   22 136/70   22 (!) 110/56   21 116/68         Physical Exam  Constitutional:       General: She is not in acute distress. Cardiovascular:      Rate and Rhythm: Normal rate. Heart sounds: No murmur heard. Pulmonary:      Effort: No respiratory distress. Breath sounds: No rales. Abdominal:      Tenderness: There is no abdominal tenderness. Musculoskeletal:      Comments: Patient has some red stools appearance of the right lower leg some swelling the left side looks stable   Neurological:      General: No focal deficit present. Mental Status: She is alert. Medical problems and test results were reviewed with the patient today. No results found for any visits on 08/24/22. Lab Results   Component Value Date/Time     11/04/2021 06:46 AM    K 4.3 11/04/2021 06:46 AM     11/04/2021 06:46 AM    CO2 29 11/04/2021 06:46 AM    BUN 36 11/04/2021 06:46 AM    GFRAA 58 11/04/2021 06:46 AM   Echo at [facility] May 10 showed ejection fraction 30 to 35% that is different than her echo last year showed normal EF. She had mild aortic insufficiency and mitral insufficiency mild pulm hypertension      ASSESSMENT and PLAN    Arlene Rock was seen today for coronary artery disease and atrial fibrillation. Diagnoses and all orders for this visit:    Longstanding persistent atrial fibrillation ([address] his A. fib has been stable on Eliquis 2.5 twice a day rate control. Atherosclerosis of native coronary artery of native heart without angina pectoris she does not have any active angina and I can tell at all    Left bundle branch block unchanged    Ischemic cardiomyopathy patient now has a more of a cardiomyopathy based on the echo report from [facility] I talked to her and her daughter about it she does not really change in symptoms I will add lisinopril afterload reduction 5/day. I agree with just medical treatment she is on Bumex 1 daily  Following the right lower leg this is been persistent the left leg really does not look bad. Lipid red her family doctor is following no recent DVT noted. She is elevate that leg is much as possible  Other orders  -     lisinopril (PRINIVIL;ZESTRIL) 5 MG tablet; Take 1 tablet by mouth daily        [unfilled]      No follow-up provider specified.     Yarelis Muse MD  8/24/2022  1:46 PM

## 2022-09-13 RX ORDER — LISINOPRIL 5 MG/1
5 TABLET ORAL DAILY
Qty: 90 TABLET | Refills: 3 | Status: SHIPPED | OUTPATIENT
Start: 2022-09-13

## 2022-09-13 RX ORDER — NITROGLYCERIN 0.4 MG/1
0.4 TABLET SUBLINGUAL EVERY 5 MIN PRN
Qty: 25 TABLET | Refills: 11 | Status: SHIPPED | OUTPATIENT
Start: 2022-09-13

## 2022-09-13 RX ORDER — METOPROLOL SUCCINATE 25 MG/1
25 TABLET, EXTENDED RELEASE ORAL DAILY
Qty: 90 TABLET | Refills: 3 | Status: SHIPPED | OUTPATIENT
Start: 2022-09-13

## 2023-09-10 NOTE — PROGRESS NOTES
TRANSFER - IN REPORT:    Verbal report received from Mojgan Walter [facility] on [facility] Hence being received from [facility] for routine progression of care      Report consisted of patients Situation, Background, Assessment and Recommendations(SBAR). Information from the following report(s) SBAR, Kardex, Intake/Output, Recent Results and Cardiac Rhythm SR was reviewed with the receiving nurse. Opportunity for questions and clarification was provided. Assessment completed upon patients arrival to unit and care assumed. show

## 2023-10-05 NOTE — PROGRESS NOTES
Verbal and bedside report given to oncoming RN, Nito Larkin. Wt Readings from Last 3 Encounters:   10/05/23 109 kg (239 lb 13.8 oz)   09/26/23 108 kg (237 lb)   09/16/23 109 kg (239 lb 10.2 oz)       · On Demadex 40mg p.o. twice daily and Zaroxolyn 5mg p.o. as needed for weight gain  · 2D echo August 2023 showed EF 70%. · We will order Lasix 80 IV twice daily starting in the morning due to severe hypokalemia.   · Chest x-ray and BNP as above  · Telemetry  · Cardiac diet, FR 1500 ml per day  · Daily weight, intake output  · Consult cardiology